# Patient Record
Sex: MALE | Race: WHITE | Employment: FULL TIME | URBAN - NONMETROPOLITAN AREA
[De-identification: names, ages, dates, MRNs, and addresses within clinical notes are randomized per-mention and may not be internally consistent; named-entity substitution may affect disease eponyms.]

---

## 2019-12-19 ENCOUNTER — TELEPHONE (OUTPATIENT)
Dept: ADMINISTRATIVE | Age: 49
End: 2019-12-19

## 2019-12-20 ENCOUNTER — OFFICE VISIT (OUTPATIENT)
Dept: PRIMARY CARE CLINIC | Age: 49
End: 2019-12-20
Payer: MEDICAID

## 2019-12-20 VITALS
TEMPERATURE: 99.3 F | HEIGHT: 68 IN | RESPIRATION RATE: 16 BRPM | WEIGHT: 218.4 LBS | HEART RATE: 86 BPM | BODY MASS INDEX: 33.1 KG/M2 | DIASTOLIC BLOOD PRESSURE: 82 MMHG | SYSTOLIC BLOOD PRESSURE: 126 MMHG | OXYGEN SATURATION: 97 %

## 2019-12-20 DIAGNOSIS — G47.33 OSA (OBSTRUCTIVE SLEEP APNEA): ICD-10-CM

## 2019-12-20 DIAGNOSIS — R53.83 FATIGUE, UNSPECIFIED TYPE: Primary | ICD-10-CM

## 2019-12-20 DIAGNOSIS — Z80.42 FAMILY HISTORY OF PROSTATE CANCER: ICD-10-CM

## 2019-12-20 DIAGNOSIS — B37.2 YEAST DERMATITIS: ICD-10-CM

## 2019-12-20 DIAGNOSIS — Z13.1 SCREENING FOR DIABETES MELLITUS: ICD-10-CM

## 2019-12-20 DIAGNOSIS — R73.9 ELEVATED BLOOD SUGAR: ICD-10-CM

## 2019-12-20 DIAGNOSIS — Z13.220 ENCOUNTER FOR SCREENING FOR LIPID DISORDER: ICD-10-CM

## 2019-12-20 DIAGNOSIS — Z12.5 SCREENING PSA (PROSTATE SPECIFIC ANTIGEN): ICD-10-CM

## 2019-12-20 DIAGNOSIS — B35.1 TOENAIL FUNGUS: ICD-10-CM

## 2019-12-20 DIAGNOSIS — R06.83 SNORING: ICD-10-CM

## 2019-12-20 LAB
ALBUMIN SERPL-MCNC: 4.7 G/DL (ref 3.5–5.2)
ALP BLD-CCNC: 72 U/L (ref 40–130)
ALT SERPL-CCNC: 31 U/L (ref 5–41)
ANION GAP SERPL CALCULATED.3IONS-SCNC: 17 MMOL/L (ref 7–19)
AST SERPL-CCNC: 18 U/L (ref 5–40)
BILIRUB SERPL-MCNC: 1.3 MG/DL (ref 0.2–1.2)
BUN BLDV-MCNC: 14 MG/DL (ref 6–20)
CALCIUM SERPL-MCNC: 9.5 MG/DL (ref 8.6–10)
CHLORIDE BLD-SCNC: 95 MMOL/L (ref 98–111)
CHOLESTEROL, TOTAL: 249 MG/DL (ref 160–199)
CO2: 27 MMOL/L (ref 22–29)
CREAT SERPL-MCNC: 0.7 MG/DL (ref 0.5–1.2)
GFR NON-AFRICAN AMERICAN: >60
GLUCOSE BLD-MCNC: 84 MG/DL (ref 74–109)
HBA1C MFR BLD: 6 % (ref 4–6)
HCT VFR BLD CALC: 51.3 % (ref 42–52)
HDLC SERPL-MCNC: 34 MG/DL (ref 55–121)
HEMOGLOBIN: 16.5 G/DL (ref 14–18)
LDL CHOLESTEROL CALCULATED: 175 MG/DL
MCH RBC QN AUTO: 29.9 PG (ref 27–31)
MCHC RBC AUTO-ENTMCNC: 32.2 G/DL (ref 33–37)
MCV RBC AUTO: 92.9 FL (ref 80–94)
PDW BLD-RTO: 12.8 % (ref 11.5–14.5)
PLATELET # BLD: 204 K/UL (ref 130–400)
PMV BLD AUTO: 10.7 FL (ref 9.4–12.4)
POTASSIUM SERPL-SCNC: 4.4 MMOL/L (ref 3.5–5)
PROSTATE SPECIFIC ANTIGEN: 1.34 NG/ML (ref 0–4)
RBC # BLD: 5.52 M/UL (ref 4.7–6.1)
SODIUM BLD-SCNC: 139 MMOL/L (ref 136–145)
TOTAL PROTEIN: 7.3 G/DL (ref 6.6–8.7)
TRIGL SERPL-MCNC: 200 MG/DL (ref 0–149)
WBC # BLD: 9.5 K/UL (ref 4.8–10.8)

## 2019-12-20 PROCEDURE — 4004F PT TOBACCO SCREEN RCVD TLK: CPT | Performed by: NURSE PRACTITIONER

## 2019-12-20 PROCEDURE — 99204 OFFICE O/P NEW MOD 45 MIN: CPT | Performed by: NURSE PRACTITIONER

## 2019-12-20 PROCEDURE — G8484 FLU IMMUNIZE NO ADMIN: HCPCS | Performed by: NURSE PRACTITIONER

## 2019-12-20 PROCEDURE — G8417 CALC BMI ABV UP PARAM F/U: HCPCS | Performed by: NURSE PRACTITIONER

## 2019-12-20 PROCEDURE — 36415 COLL VENOUS BLD VENIPUNCTURE: CPT | Performed by: NURSE PRACTITIONER

## 2019-12-20 PROCEDURE — G8427 DOCREV CUR MEDS BY ELIG CLIN: HCPCS | Performed by: NURSE PRACTITIONER

## 2019-12-20 RX ORDER — GLUCOSAMINE HCL/CHONDROITIN SU 500-400 MG
CAPSULE ORAL
Qty: 100 STRIP | Refills: 0 | Status: SHIPPED | OUTPATIENT
Start: 2019-12-20 | End: 2020-02-10

## 2019-12-20 RX ORDER — SERTRALINE HYDROCHLORIDE 25 MG/1
25 TABLET, FILM COATED ORAL 2 TIMES DAILY
COMMUNITY
End: 2020-01-07 | Stop reason: SINTOL

## 2019-12-20 RX ORDER — TRIAMCINOLONE ACETONIDE 5 MG/G
CREAM TOPICAL
Qty: 60 G | Refills: 2 | Status: SHIPPED | OUTPATIENT
Start: 2019-12-20 | End: 2019-12-27

## 2019-12-20 RX ORDER — ST. JOHN'S WORT 300 MG
CAPSULE ORAL DAILY
COMMUNITY
End: 2020-04-24 | Stop reason: DRUGHIGH

## 2019-12-20 RX ORDER — LANCETS 30 GAUGE
1 EACH MISCELLANEOUS 2 TIMES DAILY
Qty: 100 EACH | Refills: 5 | Status: SHIPPED | OUTPATIENT
Start: 2019-12-20

## 2019-12-20 RX ORDER — BLOOD-GLUCOSE METER
1 KIT MISCELLANEOUS DAILY
Qty: 1 KIT | Refills: 0 | Status: SHIPPED | OUTPATIENT
Start: 2019-12-20

## 2019-12-20 RX ORDER — ATORVASTATIN CALCIUM 40 MG/1
40 TABLET, FILM COATED ORAL DAILY
COMMUNITY
End: 2020-02-17 | Stop reason: SDUPTHER

## 2019-12-20 RX ORDER — ASCORBIC ACID 1000 MG
TABLET ORAL NIGHTLY
COMMUNITY
End: 2020-04-27 | Stop reason: ALTCHOICE

## 2019-12-20 RX ORDER — LISINOPRIL AND HYDROCHLOROTHIAZIDE 25; 20 MG/1; MG/1
1 TABLET ORAL DAILY
COMMUNITY
End: 2020-01-20 | Stop reason: SDUPTHER

## 2019-12-20 RX ORDER — NYSTATIN 100000 U/G
CREAM TOPICAL
Qty: 1 TUBE | Refills: 2 | Status: SHIPPED | OUTPATIENT
Start: 2019-12-20 | End: 2020-06-22

## 2019-12-20 ASSESSMENT — PATIENT HEALTH QUESTIONNAIRE - PHQ9
SUM OF ALL RESPONSES TO PHQ QUESTIONS 1-9: 2
SUM OF ALL RESPONSES TO PHQ9 QUESTIONS 1 & 2: 2
1. LITTLE INTEREST OR PLEASURE IN DOING THINGS: 1
SUM OF ALL RESPONSES TO PHQ QUESTIONS 1-9: 2
2. FEELING DOWN, DEPRESSED OR HOPELESS: 1

## 2019-12-20 ASSESSMENT — ENCOUNTER SYMPTOMS
GASTROINTESTINAL NEGATIVE: 1
EYES NEGATIVE: 1
RESPIRATORY NEGATIVE: 1

## 2020-01-07 ENCOUNTER — PATIENT MESSAGE (OUTPATIENT)
Dept: PRIMARY CARE CLINIC | Age: 50
End: 2020-01-07

## 2020-01-07 ENCOUNTER — OFFICE VISIT (OUTPATIENT)
Dept: PRIMARY CARE CLINIC | Age: 50
End: 2020-01-07
Payer: MEDICAID

## 2020-01-07 VITALS
HEART RATE: 90 BPM | WEIGHT: 229 LBS | RESPIRATION RATE: 18 BRPM | TEMPERATURE: 98.2 F | SYSTOLIC BLOOD PRESSURE: 130 MMHG | HEIGHT: 68 IN | OXYGEN SATURATION: 98 % | BODY MASS INDEX: 34.71 KG/M2 | DIASTOLIC BLOOD PRESSURE: 80 MMHG

## 2020-01-07 PROCEDURE — 99396 PREV VISIT EST AGE 40-64: CPT | Performed by: NURSE PRACTITIONER

## 2020-01-07 PROCEDURE — G8484 FLU IMMUNIZE NO ADMIN: HCPCS | Performed by: NURSE PRACTITIONER

## 2020-01-07 RX ORDER — BUPROPION HYDROCHLORIDE 150 MG/1
150 TABLET ORAL EVERY MORNING
Qty: 30 TABLET | Refills: 3 | Status: SHIPPED | OUTPATIENT
Start: 2020-01-07 | End: 2020-01-21 | Stop reason: DRUGHIGH

## 2020-01-07 RX ORDER — BUDESONIDE AND FORMOTEROL FUMARATE DIHYDRATE 160; 4.5 UG/1; UG/1
2 AEROSOL RESPIRATORY (INHALATION) 2 TIMES DAILY
Qty: 1 INHALER | Refills: 1 | Status: SHIPPED | OUTPATIENT
Start: 2020-01-07 | End: 2020-04-27 | Stop reason: ALTCHOICE

## 2020-01-07 ASSESSMENT — PATIENT HEALTH QUESTIONNAIRE - PHQ9
2. FEELING DOWN, DEPRESSED OR HOPELESS: 0
SUM OF ALL RESPONSES TO PHQ QUESTIONS 1-9: 0
1. LITTLE INTEREST OR PLEASURE IN DOING THINGS: 0
SUM OF ALL RESPONSES TO PHQ9 QUESTIONS 1 & 2: 0
SUM OF ALL RESPONSES TO PHQ QUESTIONS 1-9: 0

## 2020-01-07 NOTE — PROGRESS NOTES
[address] [address] [address] [place] [address]  Dept: [phone]  Dept Fax: [phone]  Loc: [phone]    Kerby Lesches is a 54 y.o. male who presents today for his medical conditions/complaints as noted below. Kerby Lesches is c/o of Annual Exam        HPI:     HPI   Chief Complaint   Patient presents with    Annual Exam   I had seen him as new patient in Dec. Admits not taking meds faithfully then. He was on zoloft for depression. It is causing ED and would like to try something else. His mother  a few months ago and he was started on Zoloft then. He has moved here to be with his girlfriend and things are better for him socially. Currently he is not working. At the last visit we set him up for home sleep study because of the sleep apnea.   Past Medical History:   Diagnosis Date    COPD (chronic obstructive pulmonary disease) ([address]     Hyperlipidemia     Hypertension     KEM (obstructive sleep apnea)       Past Surgical History:   Procedure Laterality Date    BACK SURGERY      4 rods and 4 screws in back-[facility]    TONSILLECTOMY AND ADENOIDECTOMY         Vitals 2020    SYSTOLIC [id number]   DIASTOLIC 80 82   Pulse 90 86   Temp 98.2 99.3   Resp 18 16   SpO2 98 97   Weight 229 lb 218 lb 6.4 oz   Height 5' 7.5\" 5' 7.5\"   BMI (wt*703/ht~2) 35.33 kg/m2 33.7 kg/m2       Family History   Problem Relation Age of Onset    Diabetes Mother     High Blood Pressure Mother     Sleep Apnea Mother     Prostate Cancer Maternal Grandfather        Social History     Tobacco Use    Smoking status: Current Every Day Smoker     Packs/day: 0.25     Years: 15.00     Pack years: 3.75     Types: Cigarettes    Smokeless tobacco: Never Used   Substance Use Topics    Alcohol use: Not Currently      Current Outpatient Medications   Medication Sig Dispense Refill    tiotropium (SPIRIVA HANDIHALER) 18 MCG inhalation capsule Inhale 1 capsule into the lungs daily 30 capsule 5    atorvastatin (LIPITOR) 40 MG tablet Take 40 mg by mouth daily      Valerian Root 500 MG CAPS Take by mouth nightly      St Johns Wort 300 MG CAPS Take by mouth daily      nystatin (MYCOSTATIN) 620165 UNIT/GM cream On abd. Apply topically 2 times daily. Mix with other cream 1 Tube 2    Lancets MISC 1 each by Does not apply route 2 times daily 100 each 5    blood glucose monitor strips Test 2 times a day & as needed for symptoms of irregular blood glucose. 100 strip 0    glucose monitoring kit (FREESTYLE) monitoring kit 1 kit by Does not apply route daily 1 kit 0    penicillin v potassium (VEETID) 500 MG tablet Take 1 tablet by mouth 3 times daily for 10 days 30 tablet 0    buPROPion (WELLBUTRIN XL) 300 MG extended release tablet Take 1 tablet by mouth every morning 30 tablet 3    nicotine (NICODERM CQ) 14 MG/24HR Place 1 patch onto the skin every 24 hours For 8 wks then call for next step down 7mg 30 patch 1    lisinopril-hydrochlorothiazide (PRINZIDE;ZESTORETIC) 20-25 MG per tablet Take 1 tablet by mouth daily 30 tablet 5    albuterol sulfate  (90 Base) MCG/ACT inhaler Inhale 2 puffs into the lungs 4 times daily as needed for Wheezing 1 Inhaler 5    Umeclidinium Bromide (INCRUSE ELLIPTA) 62.5 MCG/INH AEPB Inhale 1 puff into the lungs daily 1 each 2    budesonide-formoterol (SYMBICORT) 160-4.5 MCG/ACT AERO Inhale 2 puffs into the lungs 2 times daily To replace spiriva 1 Inhaler 1     No current facility-administered medications for this visit.       No Known Allergies    Health Maintenance   Topic Date Due    Pneumococcal 0-64 years Vaccine (1 of 1 - PPSV23) 11/10/1978    DTaP/Tdap/Td vaccine (1 - Tdap) 11/10/1983    HIV screen  11/10/1987    A1C test (Diabetic or Prediabetic)  12/20/2020    Lipid screen  12/20/2020    Potassium monitoring  12/20/2020    Creatinine monitoring  12/20/2020    Flu vaccine  Completed       Subjective:

## 2020-01-08 ENCOUNTER — PATIENT MESSAGE (OUTPATIENT)
Dept: PRIMARY CARE CLINIC | Age: 50
End: 2020-01-08

## 2020-01-13 ENCOUNTER — PATIENT MESSAGE (OUTPATIENT)
Dept: PRIMARY CARE CLINIC | Age: 50
End: 2020-01-13

## 2020-01-13 RX ORDER — ALBUTEROL SULFATE 90 UG/1
2 AEROSOL, METERED RESPIRATORY (INHALATION) 4 TIMES DAILY PRN
Qty: 1 INHALER | Refills: 5 | Status: SHIPPED | OUTPATIENT
Start: 2020-01-13 | End: 2020-07-10

## 2020-01-15 ENCOUNTER — HOSPITAL ENCOUNTER (OUTPATIENT)
Dept: SLEEP CENTER | Age: 50
Discharge: HOME | End: 2020-01-17
Payer: MEDICAID

## 2020-01-15 PROCEDURE — 95806 SLEEP STUDY UNATT&RESP EFFT: CPT

## 2020-01-15 PROCEDURE — G0399 HOME SLEEP TEST/TYPE 3 PORTA: HCPCS

## 2020-01-15 NOTE — PROGRESS NOTES
Pt in the office to  HST monitor. Pt was educated on how to use equipment properly and instructed to wear for 2 nights and to return on Friday. Pt states he understood.

## 2020-01-16 PROCEDURE — G0399 HOME SLEEP TEST/TYPE 3 PORTA: HCPCS

## 2020-01-16 PROCEDURE — 95806 SLEEP STUDY UNATT&RESP EFFT: CPT

## 2020-01-20 ENCOUNTER — PATIENT MESSAGE (OUTPATIENT)
Dept: PRIMARY CARE CLINIC | Age: 50
End: 2020-01-20

## 2020-01-20 RX ORDER — LISINOPRIL AND HYDROCHLOROTHIAZIDE 25; 20 MG/1; MG/1
1 TABLET ORAL DAILY
Qty: 30 TABLET | Refills: 5 | Status: SHIPPED | OUTPATIENT
Start: 2020-01-20 | End: 2020-09-04 | Stop reason: SDUPTHER

## 2020-01-21 RX ORDER — BUPROPION HYDROCHLORIDE 300 MG/1
300 TABLET ORAL EVERY MORNING
Qty: 30 TABLET | Refills: 3 | Status: SHIPPED | OUTPATIENT
Start: 2020-01-21 | End: 2020-03-27

## 2020-01-21 RX ORDER — NICOTINE 21 MG/24HR
1 PATCH, TRANSDERMAL 24 HOURS TRANSDERMAL EVERY 24 HOURS
Qty: 30 PATCH | Refills: 1 | Status: SHIPPED | OUTPATIENT
Start: 2020-01-21 | End: 2020-04-27

## 2020-01-24 PROCEDURE — 95806 SLEEP STUDY UNATT&RESP EFFT: CPT | Performed by: PSYCHIATRY & NEUROLOGY

## 2020-01-26 ENCOUNTER — PATIENT MESSAGE (OUTPATIENT)
Dept: PRIMARY CARE CLINIC | Age: 50
End: 2020-01-26

## 2020-01-27 PROBLEM — F17.200 SMOKER: Status: ACTIVE | Noted: 2020-01-27

## 2020-01-27 PROBLEM — J44.9 CHRONIC OBSTRUCTIVE PULMONARY DISEASE (HCC): Status: ACTIVE | Noted: 2020-01-27

## 2020-01-27 PROBLEM — E78.5 HYPERLIPIDEMIA: Status: ACTIVE | Noted: 2020-01-27

## 2020-01-27 PROBLEM — F41.8 DEPRESSION WITH ANXIETY: Status: ACTIVE | Noted: 2020-01-27

## 2020-01-27 RX ORDER — PENICILLIN V POTASSIUM 500 MG/1
500 TABLET ORAL 3 TIMES DAILY
Qty: 30 TABLET | Refills: 0 | Status: SHIPPED | OUTPATIENT
Start: 2020-01-27 | End: 2020-02-06

## 2020-01-27 ASSESSMENT — ENCOUNTER SYMPTOMS: GASTROINTESTINAL NEGATIVE: 1

## 2020-01-27 NOTE — TELEPHONE ENCOUNTER
From: Caden Brower  To: Sung Rodasok, APRN - CNP  Sent: 1/26/2020 3:32 PM CST  Subject: Non-Urgent Medical Question    I had half of my teeth pulled out on thuraday January 16. I had been doing fine and now I have a severe cold or sinus infection and I have a deep and forceful cough especially when I lay down. I am coughing up yellow/ brown mucas as well. I have used the inhalers and have tried to control it that way also with cough syrup. I don't know if it is my sinuses or not but doesn't seem to be in my chest. I am due to have the remaining half taken out this Thursday January 30. I need to be seen or have something called in for my infection. Can u please advise.   Michael magdaleno

## 2020-02-17 RX ORDER — ATORVASTATIN CALCIUM 40 MG/1
40 TABLET, FILM COATED ORAL DAILY
Qty: 90 TABLET | Refills: 3 | Status: SHIPPED | OUTPATIENT
Start: 2020-02-17 | End: 2020-11-02 | Stop reason: SDUPTHER

## 2020-02-25 ENCOUNTER — TELEPHONE (OUTPATIENT)
Dept: NEUROLOGY | Age: 50
End: 2020-02-25

## 2020-02-25 NOTE — TELEPHONE ENCOUNTER
Called patient about an appointment with Nathan Bethea, could not reach patient by phone , left message on patients voice mail.

## 2020-03-27 RX ORDER — BUPROPION HYDROCHLORIDE 300 MG/1
300 TABLET ORAL EVERY MORNING
Qty: 30 TABLET | Refills: 3 | Status: SHIPPED | OUTPATIENT
Start: 2020-03-27 | End: 2020-04-24 | Stop reason: DRUGHIGH

## 2020-04-01 ENCOUNTER — PATIENT MESSAGE (OUTPATIENT)
Dept: PRIMARY CARE CLINIC | Age: 50
End: 2020-04-01

## 2020-04-02 RX ORDER — OMEPRAZOLE 20 MG/1
20 CAPSULE, DELAYED RELEASE ORAL
Qty: 30 CAPSULE | Refills: 5 | Status: SHIPPED | OUTPATIENT
Start: 2020-04-02 | End: 2020-07-02

## 2020-04-24 ENCOUNTER — PATIENT MESSAGE (OUTPATIENT)
Dept: PRIMARY CARE CLINIC | Age: 50
End: 2020-04-24

## 2020-04-24 RX ORDER — BUPROPION HYDROCHLORIDE 450 MG/1
TABLET, FILM COATED, EXTENDED RELEASE ORAL
Qty: 30 TABLET | Refills: 3 | Status: SHIPPED | OUTPATIENT
Start: 2020-04-24 | End: 2020-07-07 | Stop reason: DRUGHIGH

## 2020-04-24 NOTE — TELEPHONE ENCOUNTER
From: Wolf Loyola  To: Seth Neri APRN - CNP  Sent: 4/24/2020 12:18 PM CDT  Subject: Prescription Question    Ok I am sorry I sent this messed up voice message. Not my best thing ever, but i have been taking bupropion xl 300mg daily for my depression and anxiety. With all this going on in the world it was really bothering me and still is so I messaged and I was sent buspirone 10mg 3x daily and I am out of that medicine. It doesn't seem to help with the additional high anxiety I am having. Should we increase my current medicine bupropion xl or can we try something else for the anxiety. I am having a hard time with the it.    Tila Vaughn

## 2020-04-27 ENCOUNTER — TELEMEDICINE (OUTPATIENT)
Dept: NEUROLOGY | Age: 50
End: 2020-04-27
Payer: MEDICAID

## 2020-04-27 PROBLEM — G47.33 OBSTRUCTIVE SLEEP APNEA: Status: ACTIVE | Noted: 2020-04-27

## 2020-04-27 PROBLEM — R09.02 HYPOXEMIA: Status: ACTIVE | Noted: 2020-04-27

## 2020-04-27 PROBLEM — G25.81 RESTLESS LEG SYNDROME: Status: ACTIVE | Noted: 2020-04-27

## 2020-04-27 PROBLEM — Z99.89 CPAP (CONTINUOUS POSITIVE AIRWAY PRESSURE) DEPENDENCE: Status: ACTIVE | Noted: 2020-04-27

## 2020-04-27 PROCEDURE — G8427 DOCREV CUR MEDS BY ELIG CLIN: HCPCS | Performed by: PHYSICIAN ASSISTANT

## 2020-04-27 PROCEDURE — 99214 OFFICE O/P EST MOD 30 MIN: CPT | Performed by: PHYSICIAN ASSISTANT

## 2020-04-27 RX ORDER — ROPINIROLE 0.5 MG/1
TABLET, FILM COATED ORAL
Qty: 30 TABLET | Refills: 3 | Status: SHIPPED | OUTPATIENT
Start: 2020-04-27 | End: 2020-08-20 | Stop reason: SDUPTHER

## 2020-04-27 NOTE — PROGRESS NOTES
30 capsule 5    nystatin (MYCOSTATIN) 660191 UNIT/GM cream On abd. Apply topically 2 times daily. Mix with other cream 1 Tube 2    Lancets MISC 1 each by Does not apply route 2 times daily 100 each 5    glucose monitoring kit (FREESTYLE) monitoring kit 1 kit by Does not apply route daily 1 kit 0     No current facility-administered medications for this visit. No Known Allergies      PHYSICAL EXAMINATION:    Constitutional -   General appearance: Alert in no acute distress   EYES -   Conjunctiva and lids appears normal  ENT-    Hearing intact, no scars, masses, or lesions over external nose on visible skin  Pulmonary-   Breathing appears normal, good expansion, normal effort without use of accessory muscles  Musculoskeletal -   No gross bony deformities  Gait as below, see gait exam in the neurologic exam  Skin -   No rash, erythema, or pallor noted on visible skin  Psychiatric -   Mood, affect, and behavior appear normal    Memory as below see mental status examination in the neurologic exam    NEUROLOGICAL EXAM    Mental status   [x]Awake, alert, oriented   [x]Affect attention and concentration appear appropriate  [x]Recent and remote memory appears unremarkable  [x]Speech normal without dysarthria or aphasia, comprehension and repetition intact.    COMMENTS:    Cranial Nerves [x] PER, EOMI, no nystagmus, conjugate eye movements, no ptosis  [x]Face symmetric  [x]Tongue midline   [x]Shoulder shrug normal bilaterally  COMMENTS:   Motor   [x]Antigravity x 4 extremities  [x]Normal visible bulk and tone  [x]No tremor present  COMMENTS:       Coordination [x]CAROL ANN normal bilaterally  [x]Extension to nose normal bilaterally  COMMENTS:    Gait                  [x]Normal steady gait    []Ataxic    []Spastic     []Magnetic     []Shuffling  COMMENTS:       [] OTHER:      Due to this being a TeleHealth encounter, evaluation of the following organ systems is limited: Vitals/Constitutional/EENT/Resp/CV/GI//MS/Neuro/Skin/Heme-Lymph-Imm. LABS RECORD AND IMAGING REVIEW (As below and per HPI)    No results found for: [id number]  Lab Results   Component Value Date    WBC 9.5 12/20/2019    HGB 16.5 12/20/2019    HCT 51.3 12/20/2019    MCV 92.9 12/20/2019     12/20/2019     Lab Results   Component Value Date     12/20/2019    K 4.4 12/20/2019    CL 95 (L) 12/20/2019    CO2 27 12/20/2019    BUN 14 12/20/2019    CREATININE 0.7 12/20/2019    GLUCOSE 84 12/20/2019    CALCIUM 9.5 12/20/2019    PROT 7.3 12/20/2019    LABALBU 4.7 12/20/2019    BILITOT 1.3 (H) 12/20/2019    ALKPHOS 72 12/20/2019    AST 18 12/20/2019    ALT 31 12/20/2019    LABGLOM >60 12/20/2019           I reviewed the following studies:       []  :  Clinical laboratory test results    []  :  Radiology reports    [x]  :  Review and summarization of medical records and/or obtain medical records     [x]  :  Previous/recent polysomnogram report(s)/HST    []  :  [place] Sleepiness Scale           [x]  :  Compliance download: The auto CPAP is set at a pressure range of 10cm to 20cm. Compliance download shows that he uses device: 93% of the time; percentage of days with usage >=4 hours: 87%. AHI: 1.0    ASSESSMENT:    Jorje Victor is a 54y.o. year old male evaluated via Telehealth encounter for evaluation of PAP efficacy and compliance. ICD-10-CM    1. Obstructive sleep apnea G47.33 Sleep Study with PAP Titration     [facility]   2. Restless leg syndrome G25.81    3. Hypoxemia R09.02 Sleep Study with PAP Titration     [facility]   4.  CPAP (continuous positive airway pressure) dependence Z99.89 Sleep Study with PAP Titration     [facility]          []  :  Stable     []  :  Improved                       [x]  :  Well controlled-KEM appears to be controlled as noted on compliance report; low residual AHI, but he continues to be symptomatic for KEM, as noted

## 2020-04-27 NOTE — PATIENT INSTRUCTIONS
based upon the person's medical history, physical examination, and testing, including:  ? A complaint of snoring and ineffective sleep  ? Neck size (greater than 16 inches in men or 14 inches in women) is associated with an increased risk of sleep apnea  ? A small upper airway: difficulty seeing the throat because of a tongue that is large for the mouth  ? High blood pressure, especially if it is resistant to treatment  ? If a bed partner has observed the patient during episodes of stopped breathing (apnea), choking, or gasping during sleep, there is a strong possibility of sleep apnea. Testing is usually performed in a sleep laboratory. A full sleep study is called a polysomnogram. The polysomnogram measures the breathing effort and airflow, blood oxygen level, heart rate and rhythm, duration of the various stages of sleep, body position, and movement of the arms/legs. Home monitoring devices are available that can perform a sleep study. This is a reasonable alternative to conventional testing in a sleep laboratory if the clinician strongly suspects moderate or severe sleep apnea and the patient does not have other illnesses or sleep disorders that may interfere with the results. SLEEP APNEA TREATMENT -- Sleep apnea is best treated by a knowledgeable sleep medicine specialist. The goal of treatment is to maintain an open airway during sleep. Effective treatment will eliminate the symptoms of sleep disturbance; long-term health consequences are also reduced. Most treatments require nightly use. The challenge for the clinician and the patient is to select an effective therapy that is appropriate for the patient's problem and that is acceptable for long term use. Auto-titrating CPAP delivers an amount of PAP that varies during the night. The variation is dependent on event detection software algorithms, which will increase the pressure gradually in response to flow changes until adequate patency is detected. the persons condition changes, such as a gain or loss of weight. Adjust sleep position -- Adjusting sleep position (to stay off the back) may help improve sleep quality in people who have KEM when sleeping on the back. However, this is difficult to maintain throughout the night and is rarely an adequate solution. Weight loss -- Weight loss may be helpful for obese or overweight patients. Weight loss may be accomplished with dietary changes, exercise, and/or surgical treatment. However, it can be difficult to maintain weight loss; the five-year success of non-surgical weight loss is only 5 percent, meaning that 95 percent of people regain lost weight. Avoid alcohol and other sedatives -- Alcohol can worsen sleepiness, potentially increasing the risk of accidents or injury. People with KEM are often counseled to drink little to no alcohol, even during the daytime. Similarly, people who take anti-anxiety medications or sedatives to sleep should speak with their healthcare provider about the safety of these medications. People with KEM must notify all healthcare providers, including surgeons, about their condition and the potential risks of being sedated. People with KEM who are given anesthesia and/or pain medications require special management and close monitoring to reduce the risk of a blocked airway. Dental devices -- A dental device, called an oral appliance or mandibular advancement device, can reposition the jaw (mandible), bringing the tongue and soft palate forward as well. This may relieve obstruction in some people. This treatment is excellent for reducing snoring, although the effect on KEM is sometimes more limited. As a result, dental devices are best used for mild cases of KEM when relief of snoring is the main goal. Failure to tolerate and accept CPAP is another indication for dental devices. While dental devices are not as effective as CPAP for KEM, some patients prefer a dental device to CPAP. Side effects of dental devices are generally minor but may include changes to the bite with prolonged use. Surgical treatment -- Surgery is an alternative therapy for patients who cannot tolerate or do not improve with nonsurgical treatments such as CPAP or oral devices. Surgery can also be used in combination with other nonsurgical treatments. Surgical procedures reshape structures in the upper airways or surgically reposition bone or soft tissue. Uvulopalatopharyngoplasty (UPPP) removes the uvula and excessive tissue in the throat, including the tonsils, if present. Other procedures, such as maxillomandibular advancement (MMA), address both the upper and lower pharyngeal airway more globally. UPPP alone has limited success rates (less than 50 percent) and people can relapse (when KEM symptoms return after surgery). As a result, this surgery is only recommended in a minority of people and should be considered with caution. MMA may have a higher success rate, particularly in people with abnormal jaw (maxilla and mandible) anatomy, but it is the most complicated procedure. A newer surgical approach, nerve stimulation to protrude the tongue, has promising success rates in very selected people. Tracheostomy creates a permanent opening in the neck. It is reserved for people with severe disease in whom less drastic measures have failed or are inappropriate. Although it is always successful in eliminating obstructive sleep apnea, tracheostomy requires significant lifestyle changes and carries some serious risks (eg, infection, bleeding, blockage). All surgical treatments require discussions about the goals of treatment, the expected outcomes, and potential complications.   Hypoglossal nerve stimulator- \"Inspire\" device    PAP treatment failure:  Possible causes of treatment failure include nonadherence or suboptimal adherence, weight gain, an inappropriate level of prescribed positive pressure, or an additional disorder causing sleepiness (eg, narcolepsy) that may require alterations in the therapeutic regimen. A review of medications should also be undertaken since many drugs may lead to sleepiness. Inadequate sleep time may also negate the expected effects from treatment of KEM. Also, pt's can have persistent hypersomnolence associated with sleep apnea even in the presence of adequate therapy and at those times Provigil or Nuvigil or other stimulants may be indicated. Once the patient's positive airway pressure therapy has been optimized and symptoms resolved, a regimen of long-term follow-up should be established. Annual visits are reasonable, with more frequent visits in between if new issues arise. The purpose of long-term follow-up is to assess usage and monitor for recurrent KEM, new side effects, air leakage, and fluctuations in body weight. WHERE TO GET MORE INFORMATION -- Your healthcare provider is the best source of information for questions and concerns related to your medical problem. Organizations  American Sleep Apnea Association  Provides information about sleep apnea to the public, publishes a newsletter, and serves as an advocate for people with the disorder. [place], [address] [facility], [address] [place] [address]   Nuzhat@[email or web address] org   [employer].. org   Tel: [phone]   Fax: [facility] organization that works to [employer] and safety by promoting public understanding of sleep and sleep disorders. Supports sleep-related education, research, and advocacy; produces and distributes educational materials to the public and healthcare professionals; and offers postdoctoral fellowships and grants for sleep researchers. Vinicius Khan@[email or web address]. org   Norma.[email or web address] [place]   Tel: [phone]   Fax: [phone]    Important information:  Medicare/private insurance CPAP/BiPAP/APAP requirements:  Medicare/private insurance has specific requirements for PAP compliance that must be met during the first 90 days of use to continue coverage for CPAP/BiPAP/APAP  from day 91 and beyond. The policy requires that patients use a PAP device 4 hours per 24 hour period, at least 70% of the time over a 30 day period. This data must be downloaded as a report direct from the PAP devices. This is called a compliance download. Your PAP supplier will assist you in this matter. Reminder:  Please bring a copy of the compliance download to your next office visit or have your supplier fax it to our office prior to your office visit. Note:  Where applicable, we will utilize PAP device efficiency reports, additional testing, and face-to-face  clinical evaluation subsequent to any treatment, changes in treatment, and continued treatment. Caution:  Please abstain from driving or engaging in other activities which may be hazardous in the presence of diminished alertness or daytime drowsiness. And avoid the use of sedatives or alcohol, which can worsen sleep apnea and daytime drowsiness. Mask suggestions:  - Resmed Airfit N20 (Nasal) or F20 (Full face mask). They conform to your face, thus decreasing the potential for mask leakage. You might like the [employer] (full face mask). It has a \"memory foam\" like cushion. The AirFit F30 is a smaller style full face mask designed to sit low on and cover less of your face for fewer facial marks. Respironics: You might also like to try a nasal mask called a Dreamwear nasal mask or the Dreamwear nasal pillow. Another suggestion is the [facility], it is a minimal contact full face mask. The [employer] incredible under the nose design makes it the only full face mask that won't cause red marks on the bridge of your nose when compared to other full face masks.  The Dreamwear full face mask has a  soft feel, unique in-frame symptoms of RLS can make it hard to get a good night's sleep. People with the condition often feel tired during the day. Is there a test for RLS? -- There is a test, but it is not usually necessary. Your doctor or nurse should be able to tell if you have it by asking about your symptoms and doing an exam. Still, it is possible that your doctor or nurse will decide to send you for a \"sleep study,\" to be sure of what is happening. For a sleep study, you spend the night in a lab, and you are hooked up to different machines that monitor your movements, heart rate, breathing, and other body functions. Is there anything I can do on my own to feel better? -- Yes. You might feel better if you:  ? Do activities that keep your mind alert during the day, such as crossword puzzles  ? Get moderate regular exercise  ? Massage your legs (or have someone massage them)  ? Apply heat to your legs with heating pads or by taking a warm bath  ? Avoid taking medicines that can make RLS worse - These include antihistamines like diphenhydramine (sample brand name: Benadryl). Should I see a doctor or nurse? -- See your doctor or nurse if your condition bothers you, or if it keeps you from getting a good night's sleep. How is RLS treated? -- Some people with RLS do not need medicine for it because they have mild symptoms that don't bother them very often. If treatment is needed, there are a number of medicines doctors can suggest. Examples include:  ?Iron supplements  ? Pramipexole (brand name: Mirapex)  ? Ropinirole (brand name: Rina Pikes)  ? Rotigotine (brand name: Neupro)  ? Carbidopa-levodopa (brand name: Sinemet)  ? Gabapentin enacarbil (brand name: Guerda Fern)  ? Gabapentin (brand name: Neurontin)  ? Pregabalin (brand name: Lyrica)  In people with RLS who also have a severe form of kidney disease called kidney failure, the RLS might improve with a treatment called hemodialysis (also known as just dialysis).   What if I am pregnant? -- If you are pregnant, you can take iron supplements and try the other tips that do not involve taking prescription medicines. Most of the medicines used to treat RLS are not proven to be safe to take during pregnancy. If your symptoms are bad, there are some medicines that might be OK to take. But keep in mind that the condition usually goes away or gets much better after you give birth. Patient Education        ropinirole (oral)  Pronunciation:  sergio SMALLS i role  Brand:  Requip, Requip XL  What is the most important information I should know about ropinirole? Follow all directions on your medicine label and package. Tell each of your healthcare providers about all your medical conditions, allergies, and all medicines you use. What is ropinirole? Ropinirole has some of the same effects as a chemical called dopamine, which occurs naturally in your body. Low levels of dopamine in the brain are associated with Parkinson's disease. Ropinirole is used to treat symptoms of Parkinson's disease (stiffness, tremors, muscle spasms, and poor muscle control). Ropinirole is also used to treat restless legs syndrome (RLS). Only immediate-release ropinirole (Requip) is approved to treat either Parkinson symptoms or RLS. Extended-release ropinirole (Requip XL) is approved only to treat Parkinson symptoms. Parkinson's and RLS are two separate disorders. Having one of these conditions will not cause you to have the other condition. Ropinirole may also be used for purposes not listed in this medication guide. What should I discuss with my healthcare provider before taking ropinirole? You should not use ropinirole if you are allergic to it.   To make sure ropinirole is safe for you, tell your doctor if you have:  · high or low blood pressure;  · kidney disease (or if you are on dialysis);  · heart disease, heart rhythm problems;  · a sleep disorder such as narcolepsy, or other conditions that may cause daytime sleepiness; or  · if you smoke.  People with Parkinson's disease may have a higher risk of skin cancer (melanoma). Talk to your doctor about this risk and what skin symptoms to watch for. It is not known whether this medicine will harm an unborn baby. Tell your doctor if you are pregnant or plan to become pregnant. It is not known whether ropinirole passes into breast milk or if it could affect the nursing baby. Ropinirole may slow breast milk production. Tell your doctor if you are breast-feeding. Ropinirole is not approved for use by anyone younger than 25years old. How should I take ropinirole? Follow all directions on your prescription label. Your doctor may occasionally change your dose. Do not take this medicine in larger or smaller amounts or for longer than recommended. If you are taking immediate-release ropinirole (Requip) you should not take extended-release ropinirole (Requip XL) at the same time. The dose and timing of ropinirole in treating Parkinson's disease is different from the dose and timing in treating RLS. Follow the directions on your prescription label. Ask your pharmacist if you have any questions about the kind of ropinirole you receive at the pharmacy. Ropinirole can be taken with or without food. Take the medicine at the same time each day. Do not crush, chew, or break an extended-release tablet (Requip XL). Swallow it whole. Call your doctor if you see part of the ropinirole tablet in your stool. This is a sign that your body may not have absorbed all of the medicine. If you are taking this medicine for RLS, tell your doctor if your symptoms get worse, if they occur in the morning or earlier than usual in the evening, or if you feel restless symptoms in your hands or arms. It may take up to several weeks before your symptoms improve. Keep using the medication as directed and tell your doctor if your symptoms do not improve.   Do not stop using ropinirole suddenly, or you could have unpleasant

## 2020-05-14 RX ORDER — UMECLIDINIUM 62.5 UG/1
AEROSOL, POWDER ORAL
Qty: 30 EACH | Refills: 3 | Status: SHIPPED | OUTPATIENT
Start: 2020-05-14 | End: 2020-10-05 | Stop reason: ALTCHOICE

## 2020-06-10 ENCOUNTER — HOSPITAL ENCOUNTER (OUTPATIENT)
Dept: SLEEP CENTER | Age: 50
Discharge: HOME | End: 2020-06-12
Payer: MEDICAID

## 2020-06-10 PROCEDURE — 95811 POLYSOM 6/>YRS CPAP 4/> PARM: CPT

## 2020-06-11 NOTE — PROGRESS NOTES
[place] [address]  [place], [address]  Phone [phone] Fax [phone]     Sleep Study Technician Review    Patient Name:  Adelso Blandon  :   1970  Referring Provider: VIVIANA Rivero    Brief History:  Adelso Blandon is a 54 y.o. Last sleep study was about  and lost mask with moving. He is snoring every night and very fatigued the next day he would like to have another sleep study and get a new CPAP machine. hasnt had blood work in a while. He has had some high sugar in past.  He was able to lose weight and make lifestyle changes and get the blood sugar down without medications. Height: 67 inches  Weight:229   BMI: 35.9   Neck Circ: 18  MALLAMPATI:4  ESS: 187     Type of Study: Titration Sleep Study  Time Stage Position Snore Hypopnea Obs Apnea Malgorzata Apnea PAP O2   2200 2 supine yes yes no no +8 RA   2300 2 supine yes yes no no +9 RA   2400 2 supine yes yes no no +10 RA   0100 2 supine Yes  yes no no +11 RA   0200 awake supine no no no no +11 RA   0300 rem supine no no no no +11 RA   0400 rem supine no no no no +11 RA     Summary:  Patient did well with cpap of +11 and a full face mask. He voiced no complaints. DME: +11 cmh2o   Final PAP settings:  [facility]  Mask Type: Full face   Mask: Vitera    Mask Size: Large    The study was reviewed briefly with Adelso Blandon. He will be notified of the formal results and recommendations after the study is scored and interpreted. The report will be sent to his referring provider.     Technician: Devan Dickerson

## 2020-06-19 PROCEDURE — 95811 POLYSOM 6/>YRS CPAP 4/> PARM: CPT | Performed by: PSYCHIATRY & NEUROLOGY

## 2020-06-22 ENCOUNTER — OFFICE VISIT (OUTPATIENT)
Dept: PRIMARY CARE CLINIC | Age: 50
End: 2020-06-22
Payer: MEDICAID

## 2020-06-22 VITALS
TEMPERATURE: 98.4 F | WEIGHT: 230 LBS | SYSTOLIC BLOOD PRESSURE: 110 MMHG | HEART RATE: 86 BPM | BODY MASS INDEX: 36.96 KG/M2 | OXYGEN SATURATION: 97 % | HEIGHT: 66 IN | RESPIRATION RATE: 16 BRPM | DIASTOLIC BLOOD PRESSURE: 80 MMHG

## 2020-06-22 PROCEDURE — G8427 DOCREV CUR MEDS BY ELIG CLIN: HCPCS | Performed by: NURSE PRACTITIONER

## 2020-06-22 PROCEDURE — 1036F TOBACCO NON-USER: CPT | Performed by: NURSE PRACTITIONER

## 2020-06-22 PROCEDURE — G8417 CALC BMI ABV UP PARAM F/U: HCPCS | Performed by: NURSE PRACTITIONER

## 2020-06-22 PROCEDURE — 99213 OFFICE O/P EST LOW 20 MIN: CPT | Performed by: NURSE PRACTITIONER

## 2020-06-22 RX ORDER — PREDNISONE 10 MG/1
TABLET ORAL
Qty: 18 TABLET | Refills: 0 | Status: SHIPPED | OUTPATIENT
Start: 2020-06-22 | End: 2020-07-07 | Stop reason: ALTCHOICE

## 2020-06-22 RX ORDER — CYCLOBENZAPRINE HCL 10 MG
10 TABLET ORAL 3 TIMES DAILY PRN
Qty: 30 TABLET | Refills: 0 | Status: SHIPPED | OUTPATIENT
Start: 2020-06-22 | End: 2020-08-18 | Stop reason: SDUPTHER

## 2020-06-22 NOTE — PROGRESS NOTES
[address] [address] [place] [address]  Dept: [phone]  Dept Fax: [phone]  Loc: [phone]    Nigel Freedman is a 54 y.o. male who presents today for his medical conditions/complaints as noted below. Nigel Freedman is c/o of Back Pain (patient presents today with c/o right shoulder pain that radiates down his right side. He states that this pain has been going on for a while and he has been taking ibuprofen for this. )        HPI:     HPI   Chief Complaint   Patient presents with    Back Pain     patient presents today with c/o right shoulder pain that radiates down his right side. He states that this pain has been going on for a while and he has been taking ibuprofen for this. he had low back surgery in 2007. Over last month neck pain radiates down right shoulder and arm gets a cramp under scapula. He has tried ibuprofen and tylenol without relief. He has tried heat and ice.    Past Medical History:   Diagnosis Date    COPD (chronic obstructive pulmonary disease) (HCC)     CPAP (continuous positive airway pressure) dependence     10cm to 20cm    Hyperlipidemia     Hypertension     Obstructive sleep apnea     AHI: 84.1 night one and 67.3 night two, per HST, 1/2020    KEM (obstructive sleep apnea)       Past Surgical History:   Procedure Laterality Date    BACK SURGERY  2013    4 rods and 4 screws in back-[facility]    TONSILLECTOMY AND ADENOIDECTOMY         Vitals 6/22/2020 1/7/2020 60/02/8256   SYSTOLIC 565 542 420   DIASTOLIC 80 80 82   Pulse 86 90 86   Temp 98.4 98.2 99.3   Resp 16 18 16   SpO2 97 98 97   Weight 230 lb 229 lb 218 lb 6.4 oz   Height 5' 6\" 5' 7.5\" 5' 7.5\"   BMI (wt*703/ht~2) 37.12 kg/m2 35.33 kg/m2 33.7 kg/m2       Family History   Problem Relation Age of Onset    Diabetes Mother     High Blood Pressure Mother     Sleep Apnea Mother     Prostate Cancer Maternal Grandfather        Social History screen  12/20/2020    Potassium monitoring  12/20/2020    Creatinine monitoring  12/20/2020    Flu vaccine  Completed    Hepatitis A vaccine  Aged Out    Hepatitis B vaccine  Aged Out    Hib vaccine  Aged Out    Meningococcal (ACWY) vaccine  Aged Out       Subjective:      Review of Systems   Constitutional: Positive for activity change. Musculoskeletal: Positive for neck pain. Psychiatric/Behavioral: Negative. Objective:     Physical Exam  Vitals signs and nursing note reviewed. Constitutional:       Appearance: He is well-developed. HENT:      Head: Normocephalic. Cardiovascular:      Rate and Rhythm: Normal rate and regular rhythm. Heart sounds: Normal heart sounds. Pulmonary:      Effort: Pulmonary effort is normal.      Breath sounds: Normal breath sounds. Musculoskeletal:      Cervical back: He exhibits decreased range of motion, tenderness and pain. He exhibits no bony tenderness, no swelling, no edema, no deformity, no laceration, no spasm and normal pulse. Skin:     General: Skin is warm and dry. Neurological:      Mental Status: He is alert and oriented to person, place, and time. Psychiatric:         Behavior: Behavior normal.         Thought Content: Thought content normal.         Judgment: Judgment normal.       /80   Pulse 86   Temp 98.4 °F (36.9 °C) (Temporal)   Resp 16   Ht 5' 6\" (1.676 m)   Wt 230 lb (104.3 kg)   SpO2 97%   BMI 37.12 kg/m²     Assessment:       Diagnosis Orders   1. Neck pain on right side  XR CERVICAL SPINE (4-5 VIEWS)   2. Radicular pain in right arm  XR CERVICAL SPINE (4-5 VIEWS)         Plan:   More than 50% of the time was spent counseling and coordinating care for a total time of 15 min face to face. Patient given educational materials -see patient instructions. Discussed use, benefit, and side effects of prescribed medications. All patient questions answered. Pt voiced understanding. Reviewed health maintenance.

## 2020-06-22 NOTE — PATIENT INSTRUCTIONS
and glide your head backward as in the dorsal glide stretch. 2. Raise both arms so that your hands are next to your ears. 3. Take a deep breath, and as you breathe out, lower your elbows down and behind your back. You will feel your shoulder blades slide down and together, and at the same time you will feel a stretch across your chest and the front of your shoulders. 4. Hold for about 6 seconds, and then relax for up to 10 seconds. 5. Repeat 8 to 12 times. Strengthening: Hands on head   1. Move your head backward, forward, and side to side against gentle pressure from your hands, holding each position for about 6 seconds. 2. Repeat 8 to 12 times. Follow-up care is a key part of your treatment and safety. Be sure to make and go to all appointments, and call your doctor if you are having problems. It's also a good idea to know your test results and keep a list of the medicines you take. Where can you learn more? Go to https://[employer]. org and sign in to your [employer] account. Enter P975 in the mInfo box to learn more about \"Neck: Exercises. \"     If you do not have an account, please click on the \"Sign Up Now\" link. Current as of: March 2, 2020               Content Version: 12.5  © 4358-3636 Healthwise, [employer]. Care instructions adapted under license by [facility] ([facility]). If you have questions about a medical condition or this instruction, always ask your healthcare professional. [place] [address] any warranty or liability for your use of this information.

## 2020-07-02 ENCOUNTER — OFFICE VISIT (OUTPATIENT)
Dept: OTOLARYNGOLOGY | Age: 50
End: 2020-07-02
Payer: MEDICAID

## 2020-07-02 ENCOUNTER — TELEPHONE (OUTPATIENT)
Dept: OTOLARYNGOLOGY | Age: 50
End: 2020-07-02

## 2020-07-02 VITALS
DIASTOLIC BLOOD PRESSURE: 76 MMHG | WEIGHT: 232 LBS | SYSTOLIC BLOOD PRESSURE: 132 MMHG | HEIGHT: 66 IN | BODY MASS INDEX: 37.28 KG/M2

## 2020-07-02 PROBLEM — R13.14 PHARYNGOESOPHAGEAL DYSPHAGIA: Status: ACTIVE | Noted: 2020-07-02

## 2020-07-02 PROCEDURE — 1036F TOBACCO NON-USER: CPT | Performed by: PHYSICIAN ASSISTANT

## 2020-07-02 PROCEDURE — 31575 DIAGNOSTIC LARYNGOSCOPY: CPT | Performed by: PHYSICIAN ASSISTANT

## 2020-07-02 PROCEDURE — G8427 DOCREV CUR MEDS BY ELIG CLIN: HCPCS | Performed by: PHYSICIAN ASSISTANT

## 2020-07-02 PROCEDURE — G8417 CALC BMI ABV UP PARAM F/U: HCPCS | Performed by: PHYSICIAN ASSISTANT

## 2020-07-02 PROCEDURE — 99212 OFFICE O/P EST SF 10 MIN: CPT | Performed by: PHYSICIAN ASSISTANT

## 2020-07-02 RX ORDER — OMEPRAZOLE 20 MG/1
20 CAPSULE, DELAYED RELEASE ORAL
Qty: 60 CAPSULE | Refills: 1 | Status: SHIPPED | OUTPATIENT
Start: 2020-07-02 | End: 2020-09-09 | Stop reason: SDUPTHER

## 2020-07-02 ASSESSMENT — ENCOUNTER SYMPTOMS
FACIAL SWELLING: 0
SINUS PAIN: 0
TROUBLE SWALLOWING: 1
EYE PAIN: 0
SINUS PRESSURE: 0
PHOTOPHOBIA: 0
VOICE CHANGE: 0
SORE THROAT: 1
RHINORRHEA: 0

## 2020-07-02 NOTE — PROGRESS NOTES
[facility] OTOLARYNGOLOGY/ENT  Mr. Curtis Sylvester is a pleasant 68-year-old  male that was referred by Wong Manzanares due to problems with dysphasia and a globus sensation. He reports this been going on for 2 to 3 months and seems to be worsening. He was sent for an x-ray of the neck which demonstrated a questionable mass at the epiglottis. Due to this finding a flexible laryngoscopy was recommended as well as generalized evaluation. Patient admits to having longstanding history of reflux and has been treating this with omeprazole 20 mg daily. Overall the patient denies any weight changes, cervical lymphadenopathy or any other additional issues. Allergies: Patient has no known allergies. Current Outpatient Medications   Medication Sig Dispense Refill    omeprazole (PRILOSEC) 20 MG delayed release capsule Take 1 capsule by mouth 2 times daily (before meals) 60 capsule 1    cyclobenzaprine (FLEXERIL) 10 MG tablet Take 1 tablet by mouth 3 times daily as needed for Muscle spasms May make drowsy 30 tablet 0    predniSONE (DELTASONE) 10 MG tablet Days 1-3 take 1 PO TID, days 4-6 take 1 PO BID, days 7-9 take 1 PO daily.  18 tablet 0    INCRUSE ELLIPTA 62.5 MCG/INH AEPB INHALE 1 PUFF INTO THE LUNGS DAILY 30 each 3    rOPINIRole (REQUIP) 0.5 MG tablet Take 1 q hs 30 tablet 3    buPROPion HCl ER, XL, 450 MG TB24 Increase in dose to 450mg PO daily 30 tablet 3    atorvastatin (LIPITOR) 40 MG tablet Take 1 tablet by mouth daily 90 tablet 3    blood glucose test strips (ONE TOUCH ULTRA TEST) strip USE TO TEST TWICE DAILY 100 strip 3    lisinopril-hydrochlorothiazide (PRINZIDE;ZESTORETIC) 20-25 MG per tablet Take 1 tablet by mouth daily 30 tablet 5    albuterol sulfate  (90 Base) MCG/ACT inhaler Inhale 2 puffs into the lungs 4 times daily as needed for Wheezing 1 Inhaler 5    tiotropium (SPIRIVA HANDIHALER) 18 MCG inhalation capsule Inhale 1 capsule into the lungs daily 30 capsule 5    Lancets MISC 1 each by Does not apply route 2 times daily 100 each 5    glucose monitoring kit (FREESTYLE) monitoring kit 1 kit by Does not apply route daily 1 kit 0     No current facility-administered medications for this visit. Past Surgical History:   Procedure Laterality Date    BACK SURGERY  2013    4 rods and 4 screws in back-[facility]    TONSILLECTOMY AND ADENOIDECTOMY         Past Medical History:   Diagnosis Date    COPD [facility]ch[facility] disease[facility] ([facility]     CPAP (continuous positive airway pressure) dependence     10cm to 20cm    Hyperlipidemia     Hypertension     Obstructive sleep apnea     AHI: 84.1 night one and 67.3 night two, per HST, 1/2020    KEM (obstructive sleep apnea)        Family History   Problem Relation Age of Onset    Diabetes Mother     High Blood Pressure Mother     Sleep Apnea Mother     Prostate Cancer Maternal Grandfather        Social History     Tobacco Use    Smoking status: Former Smoker     Packs/day: 0.25     Years: 15.00     Pack years: 3.75     Types: Cigarettes    Smokeless tobacco: Never Used   Substance Use Topics    Alcohol use: Not Currently           REVIEW OF SYSTEMS:  all other systems reviewed and are negative  Review of Systems   Constitutional: Negative for appetite change, chills, fever and unexpected weight change. HENT: Positive for postnasal drip, sore throat and trouble swallowing. Negative for congestion, dental problem, ear discharge, ear pain, facial swelling, hearing loss, rhinorrhea, sinus pressure, sinus pain, tinnitus and voice change. Eyes: Negative for photophobia and pain. Comments:     PHYSICAL EXAM:    /76   Ht 5' 6\" (1.676 m)   Wt 232 lb (105.2 kg)   BMI 37.45 kg/m²   Body mass index is 37.45 kg/m².     General Appearance: well developed  and well nourished  Head/ Face: normocephalic and atraumatic  Vocal Quality: good/ normal  Ears: Right Ear: External: external ears normal

## 2020-07-02 NOTE — TELEPHONE ENCOUNTER
Called pt and advised him of CT scan July 10th at 1pm. NPO 4 hours prior to test. Bring ins and ID cards to pre reg [facility]NP. Pt v/u.

## 2020-07-07 ENCOUNTER — OFFICE VISIT (OUTPATIENT)
Dept: PRIMARY CARE CLINIC | Age: 50
End: 2020-07-07
Payer: MEDICAID

## 2020-07-07 VITALS
RESPIRATION RATE: 18 BRPM | OXYGEN SATURATION: 96 % | BODY MASS INDEX: 35.07 KG/M2 | SYSTOLIC BLOOD PRESSURE: 112 MMHG | WEIGHT: 231.4 LBS | HEIGHT: 68 IN | TEMPERATURE: 98.2 F | HEART RATE: 83 BPM | DIASTOLIC BLOOD PRESSURE: 82 MMHG

## 2020-07-07 LAB
ALBUMIN SERPL-MCNC: 5.1 G/DL (ref 3.5–5.2)
ALP BLD-CCNC: 74 U/L (ref 40–130)
ALT SERPL-CCNC: 29 U/L (ref 5–41)
ANION GAP SERPL CALCULATED.3IONS-SCNC: 18 MMOL/L (ref 7–19)
AST SERPL-CCNC: 19 U/L (ref 5–40)
BILIRUB SERPL-MCNC: 1.1 MG/DL (ref 0.2–1.2)
BUN BLDV-MCNC: 18 MG/DL (ref 6–20)
CALCIUM SERPL-MCNC: 9.5 MG/DL (ref 8.6–10)
CHLORIDE BLD-SCNC: 99 MMOL/L (ref 98–111)
CHOLESTEROL, TOTAL: 191 MG/DL (ref 160–199)
CO2: 20 MMOL/L (ref 22–29)
CREAT SERPL-MCNC: 0.9 MG/DL (ref 0.5–1.2)
GFR NON-AFRICAN AMERICAN: >60
GLUCOSE BLD-MCNC: 119 MG/DL (ref 74–109)
HBA1C MFR BLD: 5.7 % (ref 4–6)
HDLC SERPL-MCNC: 46 MG/DL (ref 55–121)
LDL CHOLESTEROL CALCULATED: 126 MG/DL
POTASSIUM SERPL-SCNC: 4.2 MMOL/L (ref 3.5–5)
SODIUM BLD-SCNC: 137 MMOL/L (ref 136–145)
TOTAL PROTEIN: 7.7 G/DL (ref 6.6–8.7)
TRIGL SERPL-MCNC: 96 MG/DL (ref 0–149)

## 2020-07-07 PROCEDURE — 36415 COLL VENOUS BLD VENIPUNCTURE: CPT | Performed by: NURSE PRACTITIONER

## 2020-07-07 PROCEDURE — G8427 DOCREV CUR MEDS BY ELIG CLIN: HCPCS | Performed by: NURSE PRACTITIONER

## 2020-07-07 PROCEDURE — G8417 CALC BMI ABV UP PARAM F/U: HCPCS | Performed by: NURSE PRACTITIONER

## 2020-07-07 PROCEDURE — 1036F TOBACCO NON-USER: CPT | Performed by: NURSE PRACTITIONER

## 2020-07-07 PROCEDURE — G8926 SPIRO NO PERF OR DOC: HCPCS | Performed by: NURSE PRACTITIONER

## 2020-07-07 PROCEDURE — 3023F SPIROM DOC REV: CPT | Performed by: NURSE PRACTITIONER

## 2020-07-07 PROCEDURE — 99214 OFFICE O/P EST MOD 30 MIN: CPT | Performed by: NURSE PRACTITIONER

## 2020-07-07 RX ORDER — BUPROPION HYDROCHLORIDE 150 MG/1
TABLET ORAL
Qty: 30 TABLET | Refills: 3 | Status: SHIPPED | OUTPATIENT
Start: 2020-07-07 | End: 2020-10-26 | Stop reason: ALTCHOICE

## 2020-07-07 RX ORDER — ESCITALOPRAM OXALATE 10 MG/1
10 TABLET ORAL DAILY
Qty: 30 TABLET | Refills: 3 | Status: SHIPPED | OUTPATIENT
Start: 2020-07-07 | End: 2020-08-11 | Stop reason: DRUGHIGH

## 2020-07-07 ASSESSMENT — ENCOUNTER SYMPTOMS
RECTAL PAIN: 0
ABDOMINAL DISTENTION: 0
VOMITING: 0
CONSTIPATION: 0
NAUSEA: 0
DIARRHEA: 1
ABDOMINAL PAIN: 1
ANAL BLEEDING: 0
BLOOD IN STOOL: 0

## 2020-07-07 NOTE — PROGRESS NOTES
[facility]  [address]  [address] [address]  Dept: [phone]  Dept Fax: [phone]  Loc: [phone]    Tab Pena is a 54 y.o. male who presents today for his medical conditions/complaints as noted below. Tab Pena is c/o of 6 Month Follow-Up (pt quit smoking, pt stated that the wellbutrin is not working) and Diarrhea (pt states when he eats it goes right through him, any food )        HPI:     HPI   Chief Complaint   Patient presents with    6 Month Follow-Up     pt quit smoking, pt stated that the wellbutrin is not working    Diarrhea     pt states when he eats it goes right through him, any food    First the Wellbutrin did help and then we went up on the dose and he does not feel like it is working anymore. He did quit smoking with the use of it and patches. He feels like he is not motivated. He moved here to be with his girlfriend and they are getting along well and his sons moved here to be with him as well. Did see ENT about his abnormal neck x-ray that showed a mass in his epiglottis. They did not see anything on scope so they have offered a soft tissue CT which he has scheduled on Friday. He does admit that he has had some acid reflux and they started him on medication for this and he feels like it may be helping. as soon as he eats the last 2 months he has bm sometimes solid sometimes soft or liquid. No blood.  He has cramps as soon as eats and then has to go have BM  Past Medical History:   Diagnosis Date    COPD [facility]ch[facility] obstructive [facility] disease) ([facility])     CPAP (continuous positive airway pressure) dependence     10cm to 20cm    Hyperlipidemia     Hypertension     Obstructive sleep apnea     AHI: 84.1 night one and 67.3 night two, per HST, 1/2020    KEM (obstructive sleep apnea)       Past Surgical History:   Procedure Laterality Date    BACK SURGERY  2013    4 rods and 4 screws in back-[facility]  TONSILLECTOMY AND ADENOIDECTOMY         Vitals 7/7/2020 7/2/2020 6/22/2020 1/7/2020 41/57/2010   SYSTOLIC 305 044 291 412 013   DIASTOLIC 82 76 80 80 82   Site Left Upper Arm - - - -   Position Sitting - - - -   Cuff Size Large Adult - - - -   Pulse 83 - 86 90 86   Temp 98.2 - 98.4 98.2 99.3   Resp 18 - 16 18 16   SpO2 96 - 97 98 97   Weight 231 lb 6.4 oz 232 lb 230 lb 229 lb 218 lb 6.4 oz   Height 5' 8\" 5' 6\" 5' 6\" 5' 7.5\" 5' 7.5\"   BMI (wt*703/ht~2) 35.18 kg/m2 37.44 kg/m2 37.12 kg/m2 35.33 kg/m2 33.7 kg/m2       Family History   Problem Relation Age of Onset    Diabetes Mother     High Blood Pressure Mother     Sleep Apnea Mother     Prostate Cancer Maternal Grandfather        Social History     Tobacco Use    Smoking status: Former Smoker     Packs/day: 0.25     Years: 15.00     Pack years: 3.75     Types: Cigarettes    Smokeless tobacco: Never Used   Substance Use Topics    Alcohol use: Not Currently      Current Outpatient Medications   Medication Sig Dispense Refill    buPROPion (WELLBUTRIN XL) 150 MG extended release tablet Go down to 2 pills daily for 7 days (300mg) then down to 150mg once a day and add lexapro 30 tablet 3    escitalopram (LEXAPRO) 10 MG tablet Take 1 tablet by mouth daily 30 tablet 3    omeprazole (PRILOSEC) 20 MG delayed release capsule Take 1 capsule by mouth 2 times daily (before meals) 60 capsule 1    INCRUSE ELLIPTA 62.5 MCG/INH AEPB INHALE 1 PUFF INTO THE LUNGS DAILY 30 each 3    rOPINIRole (REQUIP) 0.5 MG tablet Take 1 q hs 30 tablet 3    atorvastatin (LIPITOR) 40 MG tablet Take 1 tablet by mouth daily 90 tablet 3    blood glucose test strips (ONE TOUCH ULTRA TEST) strip USE TO TEST TWICE DAILY 100 strip 3    lisinopril-hydrochlorothiazide (PRINZIDE;ZESTORETIC) 20-25 MG per tablet Take 1 tablet by mouth daily 30 tablet 5    albuterol sulfate  (90 Base) MCG/ACT inhaler Inhale 2 puffs into the lungs 4 times daily as needed for Wheezing 1 Inhaler 5    tiotropium (SPIRIVA HANDIHALER) 18 MCG inhalation capsule Inhale 1 capsule into the lungs daily 30 capsule 5    Lancets MISC 1 each by Does not apply route 2 times daily 100 each 5    glucose monitoring kit (FREESTYLE) monitoring kit 1 kit by Does not apply route daily 1 kit 0     No current facility-administered medications for this visit. No Known Allergies    Health Maintenance   Topic Date Due    Pneumococcal 0-64 years Vaccine (1 of 1 - PPSV23) 11/10/1978    HIV screen  11/10/1987    DTaP/Tdap/Td vaccine (1 - Tdap) 11/10/1991    Flu vaccine (1) 09/01/2020    A1C test (Diabetic or Prediabetic)  12/20/2020    Lipid screen  12/20/2020    Potassium monitoring  12/20/2020    Creatinine monitoring  12/20/2020    Hepatitis A vaccine  Aged Out    Hepatitis B vaccine  Aged Out    Hib vaccine  Aged Out    Meningococcal (ACWY) vaccine  Aged Out       Subjective:      Review of Systems   Constitutional: Positive for fatigue. Negative for fever. HENT: Negative. Gastrointestinal: Positive for abdominal pain (cramp) and diarrhea. Negative for abdominal distention, anal bleeding, blood in stool, constipation, nausea, rectal pain and vomiting. Gerd   Musculoskeletal: Positive for neck pain. Skin: Negative. Psychiatric/Behavioral: Positive for decreased concentration and dysphoric mood. Negative for hallucinations, self-injury, sleep disturbance and suicidal ideas. The patient is nervous/anxious. The patient is not hyperactive. Objective:     Physical Exam  Vitals signs and nursing note reviewed. Constitutional:       Appearance: He is well-developed. HENT:      Head: Normocephalic. Neck:      Musculoskeletal: Normal range of motion. Thyroid: No thyroid mass or thyromegaly. Cardiovascular:      Rate and Rhythm: Normal rate and regular rhythm. Heart sounds: Normal heart sounds.    Pulmonary:      Effort: Pulmonary effort is normal.      Breath sounds: Normal breath sounds. Abdominal:      General: Abdomen is protuberant. Bowel sounds are normal.      Palpations: Abdomen is soft. Tenderness: There is no right CVA tenderness, left CVA tenderness, guarding or rebound. Negative signs include Contreras's sign, Rovsing's sign, McBurney's sign, psoas sign and obturator sign. Skin:     General: Skin is warm and dry. Neurological:      Mental Status: He is alert and oriented to person, place, and time. Psychiatric:         Behavior: Behavior normal.         Thought Content: Thought content normal.         Judgment: Judgment normal.       /82 (Site: Left Upper Arm, Position: Sitting, Cuff Size: Large Adult)   Pulse 83   Temp 98.2 °F (36.8 °C) (Temporal)   Resp 18   Ht 5' 8\" (1.727 m)   Wt 231 lb 6.4 oz (105 kg)   SpO2 96%   BMI 35.18 kg/m²     Assessment:       Diagnosis Orders   1. Chronic obstructive pulmonary disease, unspecified COPD type ([facility]     2. Depression with anxiety     3. Hyperlipidemia, unspecified hyperlipidemia type  Comprehensive Metabolic Panel    Lipid Panel   4. Pharyngoesophageal dysphagia     5. Mass of epiglottis     6. Elevated blood sugar  Comprehensive Metabolic Panel    Hemoglobin A1C   7. Change in bowel function  DOROTHY Weaver, Gastroenterology, [place]   8. Gastroesophageal reflux disease with esophagitis  DOROTHY Weaver, Gastroenterology, [place]         Plan:   More than 50% of the time was spent counseling and coordinating care for a total time of 35min face to face. I reviewed his notes from the ENT and the fact that he did have some irritation likely from GERD I am going to go ahead and set him up with GI in the noon change in bowel habits as well with GI. He is fasting we did do his blood work today. See the plan below and changing his medication. Patient given educational materials -see patient instructions. Discussed use, benefit, and side effects of prescribed medications.   All patient questions answered. Pt voiced understanding. Reviewed health maintenance. Instructed to continue currentmedications, diet and exercise. Patient agreed with treatment plan. Follow up as directed. MEDICATIONS:  Orders Placed This Encounter   Medications    buPROPion (WELLBUTRIN XL) 150 MG extended release tablet     Sig: Go down to 2 pills daily for 7 days (300mg) then down to 150mg once a day and add lexapro     Dispense:  30 tablet     Refill:  3    escitalopram (LEXAPRO) 10 MG tablet     Sig: Take 1 tablet by mouth daily     Dispense:  30 tablet     Refill:  3         ORDERS:  Orders Placed This Encounter   Procedures    Comprehensive Metabolic Panel    Lipid Panel    Hemoglobin A1C   [facility] DOROTHY Stewart, Gastroenterology, [place]       Follow-up:  Return in about 4 weeks (around 8/4/2020) for depression. PATIENT INSTRUCTIONS:  Patient Instructions   Have CT on neck for ENt  Refer to Gi for change in bowel habits and acid reflux  Keep taking the omeprazole     on wellbutrin Go down to 2 pills daily for 7 days (300mg) then down to 150mg once a day and add lexapro 10mg. You will stay on both the wellbutrin 150mg and lexapro till you see me again. Call if any side effects    Electronically signed by DOROTHY Oh - CNP on 7/7/2020 at 11:02 AM    EMR Dragon/transcription disclaimer:  Much of thisencounter note is electronic transcription/translation of spoken language to printed texts. The electronic translation of spoken language may be erroneous, or at times, nonsensical words or phrases may be inadvertentlytranscribed.   Although I have reviewed the note for such errors, some may still exist.

## 2020-07-07 NOTE — PATIENT INSTRUCTIONS
Have CT on neck for ENt  Refer to Gi for change in bowel habits and acid reflux  Keep taking the omeprazole     on wellbutrin Go down to 2 pills daily for 7 days (300mg) then down to 150mg once a day and add lexapro 10mg. You will stay on both the wellbutrin 150mg and lexapro till you see me again.  Call if any side effects

## 2020-07-10 RX ORDER — ALBUTEROL SULFATE 90 UG/1
AEROSOL, METERED RESPIRATORY (INHALATION)
Qty: 8.5 G | Refills: 1 | Status: SHIPPED | OUTPATIENT
Start: 2020-07-10 | End: 2020-10-05 | Stop reason: ALTCHOICE

## 2020-07-14 ENCOUNTER — TELEMEDICINE (OUTPATIENT)
Dept: GASTROENTEROLOGY | Age: 50
End: 2020-07-14
Payer: MEDICAID

## 2020-07-14 PROBLEM — R19.7 DIARRHEA: Status: ACTIVE | Noted: 2020-07-14

## 2020-07-14 PROBLEM — R12 CHRONIC HEARTBURN: Status: ACTIVE | Noted: 2020-07-14

## 2020-07-14 PROBLEM — R19.4 CHANGE IN BOWEL HABITS: Status: ACTIVE | Noted: 2020-07-14

## 2020-07-14 PROBLEM — R13.10 DYSPHAGIA: Status: ACTIVE | Noted: 2020-07-14

## 2020-07-14 PROCEDURE — 99203 OFFICE O/P NEW LOW 30 MIN: CPT | Performed by: NURSE PRACTITIONER

## 2020-07-14 PROCEDURE — G8427 DOCREV CUR MEDS BY ELIG CLIN: HCPCS | Performed by: NURSE PRACTITIONER

## 2020-07-14 ASSESSMENT — ENCOUNTER SYMPTOMS
ANAL BLEEDING: 0
SORE THROAT: 0
COUGH: 0
VOMITING: 0
NAUSEA: 0
ABDOMINAL PAIN: 0
DIARRHEA: 1
TROUBLE SWALLOWING: 1
BACK PAIN: 0
RECTAL PAIN: 0
ABDOMINAL DISTENTION: 0
SHORTNESS OF BREATH: 0
VOICE CHANGE: 0
BLOOD IN STOOL: 0
CONSTIPATION: 0

## 2020-07-14 NOTE — PATIENT INSTRUCTIONS
You are going to have an Endoscopy and here are some basic instructions:    Nothing to eat or drink after midnight EXCEPT:  PLEASE TAKE MEDICATION(S) FOR HIGH BLOOD PRESSURE, SEIZURES, HEART, AND THYROID WITH A SIP OF WATER AT LEAST 2 HOURS PRIOR TO ARRIVAL TIME.   YOU MAY ALSO TAKE ANY INHALERS YOU ARE PRESCRIBED. You will not be able to drive for 24 hours after the procedure due to sedation. Bring a  with you the day of the procedure. No aspirin, ibuprofen, naproxen, fish oil or vitamin E for 5 days before procedure. Continue current medications. If you are on blood thinners, clearance from the prescribing physician will be obtained before your procedure is scheduled. [email or web address] may be associated with discontinuation of your blood thinner and include, but not limited to, stroke, TIA, or cardiac event. If biopsies are taken during the procedure they will be sent to a pathologist for analysis. You will be notified by mail of the pathology results in 2-3 weeks. Your physician may also schedule a follow up appointment with the nurse practitioner to discuss pathology, symptoms or to check if you have had any problems related to your procedure. If you prefer not to return to the office after your procedure please discuss this with your physician on the day of your procedure. You are going to have a colonoscopy and here are some instructions: You will be given specific directions regarding restrictions to diet and bowel prep instructions including laxatives. Please read these instructions one week prior to your scheduled procedure to ensure that you are prepared. Follow prep instructions provided for bowel prep. Take all of the bowel prep as directed. If you are having problems with nausea, stop your prep for 30-45 min to allow the nausea to subside before resuming your prep.      Nothing to eat or drink after midnight the day of the procedure EXCEPT:  PLEASE TAKE soups    Foods and drinks to avoid  Avoid foods that contain too much fiber. Stay clear of dark colored beverages. They can stick to the walls of the digestive tract and make it difficult to differentiate from blood. Some of these foods are:  Red meat, rice, nuts and vegetables   Milk, other milk based fluids and cream   Most fruit and puddings   Whole grain pasta   Cereals, bran and seeds   Colored beverages, especially those that are red or purple in color   Red colored Jell-O   On the day before the colonoscopy, continue to drink plenty of clear fluids. It is important   to keep yourself hydrated before the exam.     Please follow all instructions as provided for cleansing the bowel. Failure to have an adequately prepped colon may cause you to have incomplete exam with further testing required.

## 2020-07-14 NOTE — PROGRESS NOTES
2020     Pt location: pt home    TELEHEALTH EVALUATION -- Audio/Visual (During NCLSI-84 public health emergency)    HPI:    Chris Benítez (:  1970) has requested an audio/video evaluation for the following concern(s):    New pt referral.  From PCP. For change in bowel habits. Reports he eats and has urgency and has an immediate stool, most of the time its diarrhea but sometimes it is formed. Started abruptly 2 months ago. Prior to when this started he had one good formed BM daily. No blood in stool. No weight loss. No medication changes over the past 6 months, per pt. No recent travel. No recent surgeries. C/o dysphagia. Chronic all of his life. Occurs with foods only. Particularly rice. He reports all of this teeth were removed in Dec 2019 but he has had this problem all of his life and the removal of his teeth hasn't made anything worse. Has chronic heartburn. For 2-3 months. His PCP prescribed prilosec. And this works well to control it. GI scope reports in history per MA per OV policy, I reviewed this. Review of Systems   Constitutional: Negative for appetite change, fatigue, fever and unexpected weight change. HENT: Positive for trouble swallowing. Negative for sore throat and voice change. Respiratory: Negative for cough and shortness of breath. Cardiovascular: Negative for chest pain, palpitations and leg swelling. Gastrointestinal: Positive for diarrhea. Negative for abdominal distention, abdominal pain, anal bleeding, blood in stool, constipation, nausea, rectal pain and vomiting. Genitourinary: Negative for hematuria. Musculoskeletal: Negative for arthralgias, back pain and neck pain. Neurological: Negative for dizziness, weakness, light-headedness and headaches. Psychiatric/Behavioral: Negative for dysphoric mood and sleep disturbance. The patient is not nervous/anxious. All other systems reviewed and are negative.       Prior to Visit Medications    Medication Sig Taking?  Authorizing Provider   albuterol sulfate  (90 Base) MCG/ACT inhaler INHALE 2 PUFFS INTO THE LUNGS FOUR TIMES DAILY AS NEEDED FOR WHEEZING  Donna DOROTHY Vaughan CNP   buPROPion (WELLBUTRIN XL) 150 MG extended release tablet Go down to 2 pills daily for 7 days (300mg) then down to 150mg once a day and add lexapro  Иван Rings, DOROTHY Medeiros CNP   escitalopram (LEXAPRO) 10 MG tablet Take 1 tablet by mouth daily  Иван Rings, APRN - CNP   omeprazole (PRILOSEC) 20 MG delayed release capsule Take 1 capsule by mouth 2 times daily (before meals)  Jovan Lindo PA-C   INCRUSE ELLIPTA 62.5 MCG/INH AEPB INHALE 1 PUFF INTO THE LUNGS DAILY  Иван Rings, APRN - CNP   rOPINIRole (REQUIP) 0.5 MG tablet Take 1 q hs  VIVIANA Martinez   atorvastatin (LIPITOR) 40 MG tablet Take 1 tablet by mouth daily  Иван Rings, DOROTHY Medeiros CNP   blood glucose test strips (ONE TOUCH ULTRA TEST) strip USE TO TEST TWICE DAILY  Rosa Arriaga, APRN - CNP   lisinopril-hydrochlorothiazide (PRINZIDE;ZESTORETIC) 20-25 MG per tablet Take 1 tablet by mouth daily  Иван Rings, APRN - CNP   tiotropium (Nneka Dear) 18 MCG inhalation capsule Inhale 1 capsule into the lungs daily  Иван Rings, DOROTHY Medeiros CNP   Lancets MISC 1 each by Does not apply route 2 times daily  Иван Rings, DOROTHY Medeiros CNP   glucose monitoring kit (FREESTYLE) monitoring kit 1 kit by Does not apply route daily  Иван Rings, DOROTHY Medeiros CNP       Social History     Tobacco Use    Smoking status: Former Smoker     Packs/day: 0.25     Years: 15.00     Pack years: 3.75     Types: Cigarettes    Smokeless tobacco: Never Used   Substance Use Topics    Alcohol use: Not Currently    Drug use: Not Currently        PHYSICAL EXAMINATION:  [ INSTRUCTIONS:  \"[x]\" Indicates a positive item  \"[]\" Indicates a negative item  -- DELETE ALL ITEMS NOT EXAMINED]  Vital Signs: (As obtained by patient/caregiver or practitioner observation)    Blood pressure-  Heart rate-    Respiratory rate-    Temperature-  Pulse oximetry-     Constitutional: [x] Appears well-developed and well-nourished [x] No apparent distress      [] Abnormal-   Mental status  [x] Alert and awake  [x] Oriented to person/place/time [x]Able to follow commands      Eyes:  EOM    [x]  Normal  [] Abnormal-  Sclera  [x]  Normal  [] Abnormal -         Discharge [x]  None visible  [] Abnormal -    HENT:   [x] Normocephalic, atraumatic. [] Abnormal   [x] Mouth/Throat: Mucous membranes are moist.     External Ears [x] Normal  [] Abnormal-     Neck: [x] No visualized mass     Pulmonary/Chest: [x] Respiratory effort normal.  [x] No visualized signs of difficulty breathing or respiratory distress        [] Abnormal-      Musculoskeletal:   [x] Normal gait with no signs of ataxia         [x] Normal range of motion of neck        [] Abnormal-       Neurological:        [] No Facial Asymmetry (Cranial nerve 7 motor function) (limited exam to video visit)          [x] No gaze palsy        [] Abnormal-         Skin:        [x] No significant exanthematous lesions or discoloration noted on facial skin         [] Abnormal-            Psychiatric:       [x] Normal Affect [x] No Hallucinations        [] Abnormal-     Other pertinent observable physical exam findings-     ASSESSMENT/PLAN:  1. Change in bowel habits  - COLONOSCOPY W/ OR W/O BIOPSY; Future    2. Diarrhea, unspecified type  - COLONOSCOPY W/ OR W/O BIOPSY with random colon biopsies  -pt will  a diatherix stool kit from our office and we will call him with results    3. Dysphagia, unspecified type  - ESOPHAGOSCOPY / EGD- r/o EoE and h pylori    4. Chronic heartburn  - ESOPHAGOSCOPY / EGD; Future      No follow-ups on file. Jessica Tamez is a 54 y.o. male being evaluated by a Virtual Visit (video visit) encounter to address concerns as mentioned above. A caregiver was present when appropriate.  Due to this being a TeleHealth encounter (During SQNHY-13 public health emergency), evaluation of the following organ systems was limited: Vitals/Constitutional/EENT/Resp/CV/GI//MS/Neuro/Skin/Heme-Lymph-Imm. Pursuant to the emergency declaration under the [address] [place] [address], [address] [place] [address] and the [employer] Act, this Virtual Visit was conducted with patient's (and/or legal guardian's) consent, to reduce the patient's risk of exposure to COVID-19 and provide necessary medical care. The patient (and/or legal guardian) has also been advised to contact this office for worsening conditions or problems, and seek emergency medical treatment and/or call 911 if deemed necessary. Patient identification was verified at the start of the visit: YES    Total time spent on this encounter:not billed based on time    Services were provided through a video synchronous discussion virtually to substitute for in-person clinic visit. Patient and provider were located at their individual homes. --DOROTHY Stephenson on 7/14/2020 at 10:16 AM    An electronic signature was used to authenticate this note.

## 2020-07-29 ENCOUNTER — HOSPITAL ENCOUNTER (OUTPATIENT)
Dept: CT IMAGING | Age: 50
Discharge: HOME | End: 2020-07-29
Payer: MEDICAID

## 2020-07-29 PROCEDURE — 6360000004 HC RX CONTRAST MEDICATION: Performed by: NURSE PRACTITIONER

## 2020-07-29 PROCEDURE — 70491 CT SOFT TISSUE NECK W/DYE: CPT

## 2020-07-29 RX ADMIN — IOPAMIDOL 90 ML: 755 INJECTION, SOLUTION INTRAVENOUS at 08:50

## 2020-07-31 ENCOUNTER — VIRTUAL VISIT (OUTPATIENT)
Dept: PRIMARY CARE CLINIC | Age: 50
End: 2020-07-31
Payer: MEDICAID

## 2020-07-31 ENCOUNTER — OFFICE VISIT (OUTPATIENT)
Age: 50
End: 2020-07-31

## 2020-07-31 VITALS — OXYGEN SATURATION: 98 % | TEMPERATURE: 97.8 F | HEART RATE: 90 BPM

## 2020-07-31 LAB — SARS-COV-2, PCR: DETECTED

## 2020-07-31 PROCEDURE — 99442 PR PHYS/QHP TELEPHONE EVALUATION 11-20 MIN: CPT | Performed by: NURSE PRACTITIONER

## 2020-07-31 RX ORDER — SUCRALFATE 1 G/1
1 TABLET ORAL 4 TIMES DAILY
Qty: 40 TABLET | Refills: 0 | Status: SHIPPED | OUTPATIENT
Start: 2020-07-31 | End: 2020-08-18 | Stop reason: SDUPTHER

## 2020-07-31 NOTE — PROGRESS NOTES
Kai Adams is a 54 y.o. male evaluated via telephone on 7/31/2020. Consent:   He and/or health care decision maker is aware that that he may receive a bill for this telephone service, depending on his insurance coverage, and has provided verbal consent to proceed: Yes      Documentation:  I communicated with the patient and/or health care decision maker about positive covid 19. The patient was supposed to have an endoscopy and a colonoscopy on Monday and had a COVID test today. He got the results back which were positive. Evidently the flu clinic called him and at the time he was very upset and hung up on them. I called and spoke to him and his significant other, Alla, on the phone. The patient was just upset because all of their kids had it about 3 weeks ago and they stayed quarantine for 2 weeks and did everything they were supposed to do including sanitizing not to catch it and he is asymptomatic and now has it. He does admit that sometime last week he did lose his taste and smell for about 3 days but he never had any fever congestion or cough. He feels fine right now. The only thing that is bothering him is his stomach and he was hoping to get some answers with his endoscopy on Monday. He is still on his stomach medication. I did discuss with him I would send some Carafate in over the weekend if we could use a pharmacy in their area that would deliver so we did use [facility]'s pharmacy. We discussed if he remains asymptomatic he may repeat the COVID test at the end of next week and see if he is negative so that we could get him scheduled again for the endoscopy colonoscopy. If he has a fever and worsening symptoms he is to call me. He is to quarantine at home until he has the repeat test.  Health department should be contacting him soon. Is very possible that he did contact the infection from his children during that time 3 weeks ago and just now testing positive.     Details of this discussion including any medical advice provided:   COVID positive quarantine and be retested next Thursday or Friday to see if he test negative. If so we can move his endoscopy/colonoscopy up. We are going to add Carafate to his medication regimen. I will cancel his appointment for next week with me and we can reschedule that later. Discussed all the reasons to go to the emergency room over the weekend if they worsen. I affirm this is a Patient Initiated Episode with a Patient who has not had a related appointment within my department in the past 7 days or scheduled within the next 24 hours.     Patient identification was verified at the start of the visit: Yes    Total Time: minutes: 11-20 minutes    Note: not billable if this call serves to triage the patient into an appointment for the relevant concern      Cecil Foster

## 2020-08-11 ENCOUNTER — PATIENT MESSAGE (OUTPATIENT)
Dept: PRIMARY CARE CLINIC | Age: 50
End: 2020-08-11

## 2020-08-11 RX ORDER — ESCITALOPRAM OXALATE 20 MG/1
20 TABLET ORAL DAILY
Qty: 30 TABLET | Refills: 3 | Status: SHIPPED
Start: 2020-08-11 | End: 2020-08-25 | Stop reason: SINTOL

## 2020-08-11 RX ORDER — ALBUTEROL SULFATE 1.25 MG/3ML
1 SOLUTION RESPIRATORY (INHALATION) EVERY 6 HOURS PRN
Qty: 120 ML | Refills: 3 | Status: SHIPPED | OUTPATIENT
Start: 2020-08-11 | End: 2020-10-05 | Stop reason: ALTCHOICE

## 2020-08-11 NOTE — TELEPHONE ENCOUNTER
From: Joyce Arce  To: DOROTHY Zhang - CNP  Sent: 8/11/2020 11:28 AM CDT  Subject: Prescription Question    I have run out the Albuterol breathing treatment medicine I have been taking 2x a day to help with my breathing in addition to the other inhalers you gave me. Crystal is out of her prescription and has no more for me to use. It is helping me produce a yellow mucus that I spit up after and while I'm taking the treatment. Can u send a prescription to [facility] as we have switched pharmacies now. Also I'm having alot of anxiety since I am taking the 2 meds for. Can we please fix. this.   Jacinda Herrera

## 2020-08-13 ENCOUNTER — TELEPHONE (OUTPATIENT)
Dept: NEUROLOGY | Age: 50
End: 2020-08-13

## 2020-08-13 NOTE — TELEPHONE ENCOUNTER
Called the patient and left him a voicemail letting him know that he can't come into the office tomorrow and we will either have to reschedule his appointment or change it to a video visit. I left our number for him to call us back and let us know what he would prefer. He must have a negative COVID test before he can be seen in the office.

## 2020-08-17 ENCOUNTER — VIRTUAL VISIT (OUTPATIENT)
Dept: PRIMARY CARE CLINIC | Age: 50
End: 2020-08-17
Payer: MEDICAID

## 2020-08-17 PROCEDURE — G8428 CUR MEDS NOT DOCUMENT: HCPCS | Performed by: NURSE PRACTITIONER

## 2020-08-17 PROCEDURE — 99213 OFFICE O/P EST LOW 20 MIN: CPT | Performed by: NURSE PRACTITIONER

## 2020-08-17 ASSESSMENT — ENCOUNTER SYMPTOMS
COUGH: 1
WHEEZING: 1
SHORTNESS OF BREATH: 1

## 2020-08-18 RX ORDER — CYCLOBENZAPRINE HCL 10 MG
10 TABLET ORAL 3 TIMES DAILY PRN
Qty: 30 TABLET | Refills: 1 | Status: SHIPPED | OUTPATIENT
Start: 2020-08-18 | End: 2020-08-25 | Stop reason: SDUPTHER

## 2020-08-18 RX ORDER — SUCRALFATE 1 G/1
1 TABLET ORAL 4 TIMES DAILY
Qty: 40 TABLET | Refills: 0 | Status: SHIPPED | OUTPATIENT
Start: 2020-08-18 | End: 2020-08-25 | Stop reason: SDUPTHER

## 2020-08-20 RX ORDER — ROPINIROLE 0.5 MG/1
TABLET, FILM COATED ORAL
Qty: 30 TABLET | Refills: 3 | Status: SHIPPED | OUTPATIENT
Start: 2020-08-20 | End: 2021-01-04 | Stop reason: SDUPTHER

## 2020-08-25 RX ORDER — SUCRALFATE 1 G/1
1 TABLET ORAL 4 TIMES DAILY
Qty: 120 TABLET | Refills: 1 | Status: ON HOLD | OUTPATIENT
Start: 2020-08-25 | End: 2020-09-24 | Stop reason: ALTCHOICE

## 2020-08-25 RX ORDER — CYCLOBENZAPRINE HCL 10 MG
10 TABLET ORAL 3 TIMES DAILY PRN
Qty: 30 TABLET | Refills: 1 | Status: SHIPPED | OUTPATIENT
Start: 2020-08-25 | End: 2020-09-04

## 2020-08-28 ENCOUNTER — TELEMEDICINE (OUTPATIENT)
Dept: NEUROLOGY | Age: 50
End: 2020-08-28
Payer: MEDICAID

## 2020-08-28 PROCEDURE — 99214 OFFICE O/P EST MOD 30 MIN: CPT | Performed by: PHYSICIAN ASSISTANT

## 2020-08-28 PROCEDURE — G8427 DOCREV CUR MEDS BY ELIG CLIN: HCPCS | Performed by: PHYSICIAN ASSISTANT

## 2020-08-28 PROCEDURE — G8417 CALC BMI ABV UP PARAM F/U: HCPCS | Performed by: PHYSICIAN ASSISTANT

## 2020-08-28 PROCEDURE — 1036F TOBACCO NON-USER: CPT | Performed by: PHYSICIAN ASSISTANT

## 2020-08-28 NOTE — PROGRESS NOTES
2020    TELEHEALTH EVALUATION -- Audio/Visual (During PQXUQ-30 public health emergency)      Patient:   Chris Benítez  MR#:    [id number]  Account Number:                         YOB: 1970  Primary/Referring Physician:  DOROTHY Danielson CNP   Consulting Physician:   Holger Valerio PA-C    NEW PATIENT CONSULTATION    OR    FOLLOW UP    Chris Benítez is located at:  Home  Also present during visit:  Nobody  Provider is located at [facility] in [place], [place]    Chief Complaint   Patient presents with    Follow-up    Sleep Apnea       HPI:    Chris Benítez (:  1970) has requested an audio/video evaluation for the following concern(s): Sleep clinic follow up      Chris Benítez is a 54 y.o. male who has a history of severe KEM, RLS, and hypoxemia. He was referred for an HST due to his snoring, witnessed apneas, and excessive daytime somnolence. He was diagosed with KEM in  and prescribed CPAP and supplemental O2. His machine malfunctioned and he stopped using it. The HST, 1/15/2010 and 2020 revealed an AHI of 84.1 and 67.3  He had O2 desaturations as low as 58% night one and  72% night 2. Chris Benítez is prescribed auto CPAP with a pressure range of 10cm to 20cm. At his last office visit, he continued to be symptomatic for KEM and was referred for a titration study. The auto CPAP pressure is set at 10cm to 20cm. The compliance report indicates that he has only used CPAP 12/30 days. He was diagnosed with COVID-19, 2 weeks ago. He can't breathe with the mask on.  He is going to attempts to use it as his health condition improves.      Location or symptom:  KEM  Onset: Initial dx KEM/hypoxemia requiring nocturnal supplemental O2 in ; HST; 1/15/2020 and 2020, 2020-titration study  Timing:  q hs  Severity:  Severe  Associated:  Snoring, witnessed apneas, and excessive daytime somnolence  Alleviated:  CPAP      Past Medical History:   Diagnosis Date    COPD (chronic obstructive pulmonary disease) (HCC)     CPAP (continuous positive airway pressure) dependence     10cm to 20cm    Hyperlipidemia     Hypertension     Obstructive sleep apnea     AHI: 84.1 night one and 67.3 night two, per HST, 1/2020    KEM (obstructive sleep apnea)        Past Surgical History:   Procedure Laterality Date    BACK SURGERY  2013    4 rods and 4 screws in back-[facility]    COLONOSCOPY  2008    [facility]- pt unsure of results or whom    TONSILLECTOMY AND ADENOIDECTOMY         Family History   Problem Relation Age of Onset    Diabetes Mother     High Blood Pressure Mother     Sleep Apnea Mother     Prostate Cancer Maternal Grandfather     Colon Cancer Neg Hx     Colon Polyps Neg Hx        Social History     Socioeconomic History    Marital status: Single     Spouse name: Not on file    Number of children: Not on file    Years of education: Not on file    Highest education level: Not on file   Occupational History    Not on file   Social Needs    Financial resource strain: Not on file    Food insecurity     Worry: Not on file     Inability: Not on file   [employer] needs     Medical: Not on file     Non-medical: Not on file   Tobacco Use    Smoking status: Former Smoker     Packs/day: 0.25     Years: 15.00     Pack years: 3.75     Types: Cigarettes    Smokeless tobacco: Never Used   Substance and Sexual Activity    Alcohol use: Not Currently    Drug use: Not Currently    Sexual activity: Yes     Partners: Female   Lifestyle    Physical activity     Days per week: Not on file     Minutes per session: Not on file    Stress: Not on file   Relationships    Social connections     Talks on phone: Not on file     Gets together: Not on file     Attends Jewish service: Not on file     Active member of club or organization: Not on file     Attends meetings of clubs or organizations: Not on file     Relationship status: Not on file    Intimate partner violence     Fear of current or ex partner: Not on file     Emotionally abused: Not on file     Physically abused: Not on file     Forced sexual activity: Not on file   Other Topics Concern    Not on file   Social History Narrative    Not on file       Current Outpatient Medications   Medication Sig Dispense Refill    cyclobenzaprine (FLEXERIL) 10 MG tablet Take 1 tablet by mouth 3 times daily as needed for Muscle spasms May make drowsy 30 tablet 1    sucralfate (CARAFATE) 1 GM tablet Take 1 tablet by mouth 4 times daily for 10 days 120 tablet 1    ARIPiprazole (ABILIFY) 5 MG tablet Take 1 tablet by mouth daily Stop the lexapro 30 tablet 3    rOPINIRole (REQUIP) 0.5 MG tablet Take 1 q hs 30 tablet 3    albuterol (ACCUNEB) 1.25 MG/3ML nebulizer solution Inhale 3 mLs into the lungs every 6 hours as needed for Wheezing covidi 19 positive, fyi 120 mL 3    albuterol sulfate  (90 Base) MCG/ACT inhaler INHALE 2 PUFFS INTO THE LUNGS FOUR TIMES DAILY AS NEEDED FOR WHEEZING 8.5 g 1    buPROPion (WELLBUTRIN XL) 150 MG extended release tablet Go down to 2 pills daily for 7 days (300mg) then down to 150mg once a day and add lexapro 30 tablet 3    omeprazole (PRILOSEC) 20 MG delayed release capsule Take 1 capsule by mouth 2 times daily (before meals) 60 capsule 1    INCRUSE ELLIPTA 62.5 MCG/INH AEPB INHALE 1 PUFF INTO THE LUNGS DAILY 30 each 3    atorvastatin (LIPITOR) 40 MG tablet Take 1 tablet by mouth daily 90 tablet 3    blood glucose test strips (ONE TOUCH ULTRA TEST) strip USE TO TEST TWICE DAILY 100 strip 3    lisinopril-hydrochlorothiazide (PRINZIDE;ZESTORETIC) 20-25 MG per tablet Take 1 tablet by mouth daily 30 tablet 5    tiotropium (SPIRIVA HANDIHALER) 18 MCG inhalation capsule Inhale 1 capsule into the lungs daily 30 capsule 5    Lancets MISC 1 each by Does not apply route 2 times daily 100 each 5    glucose monitoring kit (FREESTYLE) monitoring kit 1 kit by Does not apply route daily 1 kit 0 No current facility-administered medications for this visit. No Known Allergies    REVIEW OF SYSTEMS     Constitutional: []? Fever []? Sweats []? Chills []? Recent Injury   [x]? Denies all unless marked  HENT:[]? Headache  []? Head Injury  []? Sore Throat  []? Ear Pain  []? Dizziness []? Hearing Loss   [x]? Denies all unless marked  Spine:  []? Neck pain  []? Back pain  []? Sciaticia  [x]? Denies all unless marked  Cardiovascular:[]? Chest Pain []? Palpitations []? Heart Disease  [x]? Denies all unless marked  Pulmonary: []? Shortness of Breath []? Cough   [x]? Denies all unless marked  Gastrointestinal:  []? Abdominal Pain  []? Blood in Stool  []? Diarrhea []? Constipation []? Nausea  []? Vomiting  [x]? Denies all unless marked  Genitourinary:  []? Dysuria []? Frequency  []? Incontinence []? Urgency   [x]? Denies all unless marked  Musculoskeletal: []? Arthralgia  []? Myalgias []? Muscle cramps  []? Muscle twitches   [x]? Denies all unless marked   Extremities:   []? Pain   []? Swelling   [x]? Denies all unless marked  Skin:[]? Rash  []? Color Change  [x]? Denies all unless marked  Neurological:[]? Visual Disturbance []? Double Vision []? Slurred Speech []? Trouble swallowing  []? Vertigo []? Tingling []? Numbness []? Weakness []? Loss of Balance   []? Loss of Consciousness []? Memory Loss []? Seizures  [x]? Denies all unless marked  Psychiatric/Behavioral:[]? Depression []? Anxiety  [x]? Denies all unless marked  Sleep: []? Insomnia []? Sleep Disturbance []? Snoring [x]? Restless Legs []? Daytime Sleepiness [x]? Sleep Apnea  []? Denies all unless marked    The MA has completed the ROS with the patient. I have reviewed it in its' entirety with the patient and agree with the documentation. PHYSICAL EXAMINATION:  Constitutional -   General appearance: Alert in no acute distress, well nourished, and  well developed. EYES -   Sclera and lids appears normal.  ENT-    Hearing intact.  Ears and external nose on visible 07/07/2020    CO2 20 (L) 07/07/2020    BUN 18 07/07/2020    CREATININE 0.9 07/07/2020    GLUCOSE 119 (H) 07/07/2020    CALCIUM 9.5 07/07/2020    PROT 7.7 07/07/2020    LABALBU 5.1 07/07/2020    BILITOT 1.1 07/07/2020    ALKPHOS 74 07/07/2020    AST 19 07/07/2020    ALT 29 07/07/2020    LABGLOM >60 07/07/2020             I reviewed the following studies:       [x]  :  Clinical laboratory test results    []  :  Radiology reports    [x]  :  Review and summarization of medical records and/or obtain medical records     [x]  :  Previous/recent polysomnogram report(s)    []  :  [place] Sleepiness Scale         [x]  :  Compliance download: The auto CPAP is set at a pressure range of 8cm to 12cm. Compliance download shows that he uses device: 40% of the time; percentage of days with usage >=4 hours: 17%. AHI: 1.1    ASSESSMENT:    Eran Sosa is a 54y.o. year old male evaluated via Telehealth encounter for evaluation of PAP efficacy and compliance. ICD-10-CM    1. Obstructive sleep apnea  G47.33    2. Restless leg syndrome  G25.81    3. CPAP (continuous positive airway pressure) dependence  Z99.89           []  :  Stable     []  :  Improved                       [x]  :  Well controlled- with CPAP usage low residual AHI; but he is non-compliant with 12/30 days CPAP usage; he has not been using it since he was diagnosed with COVID-19            []  :  Resolving     []  :  Resolved     []  :  Inadequately controlled     []  :  Worsening     []  :  Additional workup planned        PLAN:  No orders of the defined types were placed in this encounter. 1.   Reviewed the etiology,  pathophysiology, diagnosis, treatment options, and risks of untreated KEM. Risks may include, but are not limited to  hypertension, coronary artery disease, diabetes, stroke, weight gain, impaired cognition, daytime somnolence,  and motor vehicle accidents.  Advised to abstain from driving or operating heavy machinery when drowsy and the use of respiratory suppressants. Will evaluate for clinical benefit and compliance during a 30 day period within the preceding 90 days if prescribed PAP therapy. 2.  The following educational material has been included in this visit after visit summary for your review: KEM/PAP guidelines-Discussed with the patient and all questions fully answered. 3.  Use CPAP when able  4. Order-supplies-[facility]  5. Keep follow up with Pulmonologist, next week  6. Follow up in 1 month      Pursuant to the emergency declaration under the [address] [place] [address] and the [employer] Act, this Virtual  Visit was conducted, with patient's consent, to reduce the patient's risk of exposure to COVID-19 and provide continuity of care for an established patient. Services were provided through a video synchronous discussion virtually to substitute for in-person clinic visit.

## 2020-08-28 NOTE — PATIENT INSTRUCTIONS
Patient education: Sleep apnea in adults       INTRODUCTION -- Normally during sleep, air moves through the throat and in and out of the lungs at a regular rhythm. In a person with sleep apnea, air movement is periodically diminished or stopped. There are two types of sleep apnea: obstructive sleep apnea and central sleep apnea. In obstructive sleep apnea, breathing is abnormal because of narrowing or closure of the throat. In central sleep apnea, breathing is abnormal because of a change in the breathing control and rhythm. Sleep apnea is a serious condition that can affect a person's ability to safely perform normal daily activities and can affect long term health. Approximately 25 percent of adults are at risk for sleep apnea of some degree. Men are more commonly affected than women. Other risk factors include middle and older age, being overweight or obese, and having a small mouth and throat. This topic review focuses on the most common type of sleep apnea in adults, obstructive sleep apnea (KEM). HOW SLEEP APNEA OCCURS -- The throat is surrounded by muscles that control the airway for speaking, swallowing, and breathing. During sleep, these muscles are less active, and this causes the throat to narrow. In most people, this narrowing does not affect breathing. In others, it can cause snoring, sometimes with reduced or completely blocked airflow. A completely blocked airway without airflow is called an obstructive apnea. Partial obstruction with diminished airflow is called a hypopnea. A person may have apnea and hypopnea during sleep. Insufficient breathing due to apnea or hypopnea causes oxygen levels to fall and carbon dioxide to rise. Because the airway is blocked, breathing faster or harder does not help to improve oxygen levels until the airway is reopened. Typically, the obstruction requires the person to awaken to activate the upper airway muscles.  Once the airway is opened, the person then takes several deep breaths to catch up on breathing. As the person awakens, he or she may move briefly, snort or snore, and take a deep breath. Less frequently, a person may awaken completely with a sensation of gasping, smothering, or choking. If the person falls back to sleep quickly, he or she will not remember the event. Many people with sleep apnea are unaware of their abnormal breathing in sleep, and all patients underestimate how often their sleep is interrupted. Awakening from sleep causes sleep to be unrefreshing and causes fatigue and daytime sleepiness. Anatomic causes of obstructive sleep apnea --  Most patients have KEM because of a small upper airway. As the bones of the face and skull develop, some people develop a small lower face, a small mouth, and a tongue that seems too large for the mouth. These features are genetically determined, which explains why KEM tends to cluster in families. Obesity is another major factor. Tonsil enlargement can be an important cause, especially in children. SLEEP APNEA SYMPTOMS -- The main symptoms of KEM are loud snoring, fatigue, and daytime sleepiness. However, some people have no symptoms. For example, if the person does not have a bed partner, he or she may not be aware of the snoring. Fatigue and sleepiness have many causes and are often attributed to overwork and increasing age. As a result, a person may be slow to recognize that they have a problem. A bed partner or spouse often prompts the patient to seek medical care. Other symptoms may include one or more of the following:  ?Restless sleep  ? Awakening with choking, gasping, or smothering  ? Morning headaches, dry mouth, or sore throat  ? Waking frequently to urinate  ? Awakening unrested, groggy  ? Low energy, difficulty concentrating, memory impairment    Risk factors -- Certain factors increase the risk of sleep apnea.   ?Increasing age - KEM occurs at all ages, but it is more common in middle and apnea. Testing is usually performed in a sleep laboratory. A full sleep study is called a polysomnogram. The polysomnogram measures the breathing effort and airflow, blood oxygen level, heart rate and rhythm, duration of the various stages of sleep, body position, and movement of the arms/legs. Home monitoring devices are available that can perform a sleep study. This is a reasonable alternative to conventional testing in a sleep laboratory if the clinician strongly suspects moderate or severe sleep apnea and the patient does not have other illnesses or sleep disorders that may interfere with the results. SLEEP APNEA TREATMENT -- Sleep apnea is best treated by a knowledgeable sleep medicine specialist. The goal of treatment is to maintain an open airway during sleep. Effective treatment will eliminate the symptoms of sleep disturbance; long-term health consequences are also reduced. Most treatments require nightly use. The challenge for the clinician and the patient is to select an effective therapy that is appropriate for the patient's problem and that is acceptable for long term use. Auto-titrating CPAP delivers an amount of PAP that varies during the night. The variation is dependent on event detection software algorithms, which will increase the pressure gradually in response to flow changes until adequate patency is detected. After a period of sustained upper airway patency, the delivered level of pressure gradually decreases until the algorithm identifies recurrent upper airway obstruction, at which point the delivered pressure again increases. The result is that the delivered pressure varies throughout the night, in an effort to provide the lowest pressure that is necessary to maintain upper airway patency. Continuous positive airway pressure (CPAP) -- The most effective treatment for sleep apnea uses air pressure from a mechanical device to keep the upper airway open during sleep.  A CPAP (continuous positive airway pressure)  device uses an air-tight attachment to the nose, typically a mask, connected to a tube and a blower which generates the pressure. Devices that fit comfortably into the nasal opening, rather than over the nose, are also available. CPAP should be used any time the person sleeps (day or night). The CPAP device is usually used for the first time in the sleep lab, where a technician can adjust the pressure and select the best equipment to keep the airway open. Alternatively, an auto device with a self-adjusting pressure feature, provided with proper education and training, can get treatment started without another sleep test. While the treatment may seem uncomfortable, noisy, or bulky at first, most people accept the treatment after experiencing better sleep. However, difficulty with mask comfort and nasal congestion prevent up to 50 percent of people from using the treatment on a regular basis. Continued follow up with a healthcare provider helps to ensure that the treatment is effective and comfortable. Information from the CPAP machine is often used by physicians, therapists, and insurers to track the success of treatment. CPAP can be delivered with different features to improve comfort and solve problems that may come up during treatment. Changes in treatment may be needed if symptoms do not improve or if the persons condition changes, such as a gain or loss of weight. Adjust sleep position -- Adjusting sleep position (to stay off the back) may help improve sleep quality in people who have KEM when sleeping on the back. However, this is difficult to maintain throughout the night and is rarely an adequate solution. Weight loss -- Weight loss may be helpful for obese or overweight patients. Weight loss may be accomplished with dietary changes, exercise, and/or surgical treatment.  However, it can be difficult to maintain weight loss; the five-year success of non-surgical Other procedures, such as maxillomandibular advancement (MMA), address both the upper and lower pharyngeal airway more globally. UPPP alone has limited success rates (less than 50 percent) and people can relapse (when KEM symptoms return after surgery). As a result, this surgery is only recommended in a minority of people and should be considered with caution. MMA may have a higher success rate, particularly in people with abnormal jaw (maxilla and mandible) anatomy, but it is the most complicated procedure. A newer surgical approach, nerve stimulation to protrude the tongue, has promising success rates in very selected people. Tracheostomy creates a permanent opening in the neck. It is reserved for people with severe disease in whom less drastic measures have failed or are inappropriate. Although it is always successful in eliminating obstructive sleep apnea, tracheostomy requires significant lifestyle changes and carries some serious risks (eg, infection, bleeding, blockage). All surgical treatments require discussions about the goals of treatment, the expected outcomes, and potential complications. Hypoglossal nerve stimulator- \"Inspire\" device    PAP treatment failure:  Possible causes of treatment failure include nonadherence or suboptimal adherence, weight gain, an inappropriate level of prescribed positive pressure, or an additional disorder causing sleepiness (eg, narcolepsy) that may require alterations in the therapeutic regimen. A review of medications should also be undertaken since many drugs may lead to sleepiness. Inadequate sleep time may also negate the expected effects from treatment of KEM. Also, pt's can have persistent hypersomnolence associated with sleep apnea even in the presence of adequate therapy and at those times Provigil or Nuvigil or other stimulants may be indicated.     Once the patient's positive airway pressure therapy has been optimized and symptoms resolved, a regimen of long-term follow-up should be established. Annual visits are reasonable, with more frequent visits in between if new issues arise. The purpose of long-term follow-up is to assess usage and monitor for recurrent KEM, new side effects, air leakage, and fluctuations in body weight. WHERE TO GET MORE INFORMATION -- Your healthcare provider is the best source of information for questions and concerns related to your medical problem. Organizations  American Sleep Apnea Association  Provides information about sleep apnea to the public, publishes a newsletter, and serves as an advocate for people with the disorder. [place], [address]   Vane@[employer]. org   [email or web address]. org   Tel: [phone]   Fax: [facility] organization that works to [employer] and safety by promoting public understanding of sleep and sleep disorders. Supports sleep-related education, research, and advocacy; produces and distributes educational materials to the public and healthcare professionals; and offers postdoctoral fellowships and grants for sleep researchers. [address]   Martha@[employer]. org   [employer]. org   Tel: [phone]   Fax: [phone]    Important information:  Medicare/private insurance CPAP/BiPAP/APAP requirements:  Medicare/private insurance has specific requirements for PAP compliance that must be met during the first 90 days of use to continue coverage for CPAP/BiPAP/APAP  from day 91 and beyond. The policy requires that patients use a PAP device 4 hours per 24 hour period, at least 70% of the time over a 30 day period. This data must be downloaded as a report direct from the PAP devices. This is called a compliance download. Your PAP supplier will assist you in this matter.      Reminder:  Please bring a copy of the compliance download to your next office visit or have your supplier fax it to our office prior to your office visit. Note:  Where applicable, we will utilize PAP device efficiency reports, additional testing, and face-to-face  clinical evaluation subsequent to any treatment, changes in treatment, and continued treatment. Caution:  Please abstain from driving or engaging in other activities which may be hazardous in the presence of diminished alertness or daytime drowsiness. And avoid the use of sedatives or alcohol, which can worsen sleep apnea and daytime drowsiness. Mask suggestions:  -     Resmed Airfit N20 (Nasal) or F20 (Full face mask). They conform to your face, thus decreasing the potential for mask leakage. You might like the [employer] (full face mask). It has a \"memory foam\" like cushion. The AirFit F30 is a smaller style full face mask designed to sit low on and cover less of your face for fewer facial marks. AirFit N30i has a top of the head tube with a nasal mask. AirFit P10 reported to be the most comfortable nasal pillow mask. Resmed Mirage FX reported to be the most comfortable nasal mask. Resmed Mirage [place] reported to be the most comfortable hybrid mask. Respironics: You might also like to try a nasal mask called a Dreamwear nasal mask or the Dreamwear nasal pillow. Another suggestion is the [facility], it is a minimal contact full face mask. The Charlet Distad incredible under the nose design makes it the only full face mask that won't cause red marks on the bridge of your nose when compared to other full face masks. The Dreamwear full face mask has a  soft feel, unique in-frame air-flow, and innovative air tube connection at the top of the head for the ultimate in sleep comfort. Comfort Gel Blue. Dreamwear gel pillows. Rick & Ana: Brevida nasal pillow mask and Simplus FFM    The use of a memory foam CPAP pillow supports the head and neck throughout the night.

## 2020-09-01 ENCOUNTER — PATIENT MESSAGE (OUTPATIENT)
Dept: PRIMARY CARE CLINIC | Age: 50
End: 2020-09-01

## 2020-09-01 NOTE — TELEPHONE ENCOUNTER
From: Hamilton Lieberman  To: DOROTHY Conley - CNP  Sent: 9/1/2020 10:29 AM CDT  Subject: Non-Urgent Medical Question    Can I get a chamber for my inhaler I dont think that I'm getting enough of my inhaler.  Thanks sharla

## 2020-09-04 ENCOUNTER — OFFICE VISIT (OUTPATIENT)
Dept: PRIMARY CARE CLINIC | Age: 50
End: 2020-09-04
Payer: MEDICAID

## 2020-09-04 VITALS
WEIGHT: 245.4 LBS | DIASTOLIC BLOOD PRESSURE: 82 MMHG | SYSTOLIC BLOOD PRESSURE: 123 MMHG | RESPIRATION RATE: 16 BRPM | BODY MASS INDEX: 39.44 KG/M2 | HEIGHT: 66 IN | HEART RATE: 88 BPM | OXYGEN SATURATION: 95 % | TEMPERATURE: 97.4 F

## 2020-09-04 LAB
ALBUMIN SERPL-MCNC: 4.5 G/DL (ref 3.5–5.2)
ALP BLD-CCNC: 66 U/L (ref 40–130)
ALT SERPL-CCNC: 40 U/L (ref 5–41)
ANION GAP SERPL CALCULATED.3IONS-SCNC: 14 MMOL/L (ref 7–19)
AST SERPL-CCNC: 23 U/L (ref 5–40)
BILIRUB SERPL-MCNC: 1.3 MG/DL (ref 0.2–1.2)
BUN BLDV-MCNC: 12 MG/DL (ref 6–20)
CALCIUM SERPL-MCNC: 9.3 MG/DL (ref 8.6–10)
CHLORIDE BLD-SCNC: 97 MMOL/L (ref 98–111)
CO2: 27 MMOL/L (ref 22–29)
CREAT SERPL-MCNC: 0.9 MG/DL (ref 0.5–1.2)
GFR AFRICAN AMERICAN: >59
GFR NON-AFRICAN AMERICAN: >60
GLUCOSE BLD-MCNC: 105 MG/DL (ref 74–109)
HCT VFR BLD CALC: 43.1 % (ref 42–52)
HEMOGLOBIN: 14.1 G/DL (ref 14–18)
MCH RBC QN AUTO: 29.8 PG (ref 27–31)
MCHC RBC AUTO-ENTMCNC: 32.7 G/DL (ref 33–37)
MCV RBC AUTO: 91.1 FL (ref 80–94)
PDW BLD-RTO: 12.9 % (ref 11.5–14.5)
PLATELET # BLD: 192 K/UL (ref 130–400)
PMV BLD AUTO: 10.5 FL (ref 9.4–12.4)
POTASSIUM SERPL-SCNC: 4.3 MMOL/L (ref 3.5–5)
PRO-BNP: 29 PG/ML (ref 0–450)
RBC # BLD: 4.73 M/UL (ref 4.7–6.1)
SODIUM BLD-SCNC: 138 MMOL/L (ref 136–145)
TOTAL PROTEIN: 6.8 G/DL (ref 6.6–8.7)
TSH SERPL DL<=0.05 MIU/L-ACNC: 2.3 UIU/ML (ref 0.27–4.2)
WBC # BLD: 6.9 K/UL (ref 4.8–10.8)

## 2020-09-04 PROCEDURE — 1036F TOBACCO NON-USER: CPT | Performed by: NURSE PRACTITIONER

## 2020-09-04 PROCEDURE — G8417 CALC BMI ABV UP PARAM F/U: HCPCS | Performed by: NURSE PRACTITIONER

## 2020-09-04 PROCEDURE — G8427 DOCREV CUR MEDS BY ELIG CLIN: HCPCS | Performed by: NURSE PRACTITIONER

## 2020-09-04 PROCEDURE — 36415 COLL VENOUS BLD VENIPUNCTURE: CPT | Performed by: NURSE PRACTITIONER

## 2020-09-04 PROCEDURE — 99214 OFFICE O/P EST MOD 30 MIN: CPT | Performed by: NURSE PRACTITIONER

## 2020-09-04 RX ORDER — LISINOPRIL AND HYDROCHLOROTHIAZIDE 25; 20 MG/1; MG/1
1 TABLET ORAL DAILY
Qty: 90 TABLET | Refills: 5 | Status: SHIPPED | OUTPATIENT
Start: 2020-09-04 | End: 2020-10-05 | Stop reason: ALTCHOICE

## 2020-09-04 RX ORDER — PRENATAL VIT 91/IRON/FOLIC/DHA 28-975-200
COMBINATION PACKAGE (EA) ORAL
Qty: 1 TUBE | Refills: 1 | Status: SHIPPED | OUTPATIENT
Start: 2020-09-04 | End: 2020-10-05 | Stop reason: ALTCHOICE

## 2020-09-04 ASSESSMENT — ENCOUNTER SYMPTOMS
GASTROINTESTINAL NEGATIVE: 1
SHORTNESS OF BREATH: 1

## 2020-09-04 NOTE — PROGRESS NOTES
[facility] PHYSICIAN SERVICES  [facility]  [address] [place] [address]  [address] [place] [address]  Dept: [phone]  Dept Fax: [phone]  Loc: [phone]    Kai Adams is a 54 y.o. male who presents today for his medical conditions/complaints as noted below. Kai Adams is c/o of Weight Gain (patient presents today for weight gain. )        HPI:     HPI   Chief Complaint   Patient presents with    Weight Gain     patient presents today for weight gain.    end Of July had covid and was coughing a lot. Gaining fluid weight every day. He is using nebulizer still. Had negative CXR on 8-. He says he gets short of breath if he walks very far. He had noticed he was gaining 2 to 3 pounds a day. He is seeing a lung doctor and uses inhalers. He is using his CPAP to sleep at night. He denies any fever. He also would like to discuss his Wellbutrin. We had added Abilify for his depression. We had stopped the Lexapro. He is unsure if he should continue the Wellbutrin. He is feeling better just taking the Abilify and Wellbutrin. He did know he was post to wean off the Wellbutrin.   He was suffering with some ED from the Lexapro so we stopped it  Past Medical History:   Diagnosis Date    COPD (chronic obstructive pulmonary disease) (HCC)     CPAP (continuous positive airway pressure) dependence     10cm to 20cm    Hyperlipidemia     Hypertension     Obstructive sleep apnea     AHI: 84.1 night one and 67.3 night two, per HST, 1/2020    KEM (obstructive sleep apnea)       Past Surgical History:   Procedure Laterality Date    BACK SURGERY  2013    4 rods and 4 screws in back-[facility]    COLONOSCOPY  2008    [facility] 27- pt unsure of results or whom    TONSILLECTOMY AND ADENOIDECTOMY         Vitals 9/4/2020 7/31/2020 7/7/2020 7/2/2020 6/22/2020 8/8/4220   SYSTOLIC 639 - 124 [phone]   DIASTOLIC 82 - 82 76 80 80   Site - - Left Upper Arm - - -   Position - - Sitting - - -   Cuff Size - - Large Adult - - -   Pulse 88 90 83 - 86 90   Temp 97.4 97.8 98.2 - 98.4 98.2   Resp 16 - 18 - 16 18   SpO2 95 98 96 - 97 98   Weight 245 lb 6.4 oz - 231 lb 6.4 oz 232 lb 230 lb 229 lb   Height 5' 6\" - 5' 8\" 5' 6\" 5' 6\" 5' 7.5\"   BMI (wt*703/ht~2) 39.6 kg/m2 - 35.18 kg/m2 37.44 kg/m2 37.12 kg/m2 35.33 kg/m2       Family History   Problem Relation Age of Onset    Diabetes Mother     High Blood Pressure Mother     Sleep Apnea Mother     Prostate Cancer Maternal Grandfather     Colon Cancer Neg Hx     Colon Polyps Neg Hx        Social History     Tobacco Use    Smoking status: Former Smoker     Packs/day: 0.25     Years: 15.00     Pack years: 3.75     Types: Cigarettes    Smokeless tobacco: Never Used   Substance Use Topics    Alcohol use: Not Currently      Current Outpatient Medications   Medication Sig Dispense Refill    terbinafine (LAMISIL AT) 1 % cream Apply topically 2 times daily. area on hip for 14 days 1 Tube 1    lisinopril-hydroCHLOROthiazide (PRINZIDE;ZESTORETIC) 20-25 MG per tablet Take 1 tablet by mouth daily 90 tablet 5    Spacer/Aero-Holding Chambers KAYAL 1 Device by Does not apply route daily as needed (wheezing) To use with inhaler.  1 Device 0    cyclobenzaprine (FLEXERIL) 10 MG tablet Take 1 tablet by mouth 3 times daily as needed for Muscle spasms May make drowsy 30 tablet 1    sucralfate (CARAFATE) 1 GM tablet Take 1 tablet by mouth 4 times daily for 10 days 120 tablet 1    ARIPiprazole (ABILIFY) 5 MG tablet Take 1 tablet by mouth daily Stop the lexapro 30 tablet 3    rOPINIRole (REQUIP) 0.5 MG tablet Take 1 q hs 30 tablet 3    albuterol (ACCUNEB) 1.25 MG/3ML nebulizer solution Inhale 3 mLs into the lungs every 6 hours as needed for Wheezing covidi 19 positive, fyi 120 mL 3    albuterol sulfate  (90 Base) MCG/ACT inhaler INHALE 2 PUFFS INTO THE LUNGS FOUR TIMES DAILY AS NEEDED FOR WHEEZING 8.5 g 1    buPROPion (WELLBUTRIN XL) 150 MG extended release tablet Go down to 2 pills daily for 7 days (300mg) then down to 150mg once a day and add lexapro 30 tablet 3    omeprazole (PRILOSEC) 20 MG delayed release capsule Take 1 capsule by mouth 2 times daily (before meals) 60 capsule 1    INCRUSE ELLIPTA 62.5 MCG/INH AEPB INHALE 1 PUFF INTO THE LUNGS DAILY 30 each 3    atorvastatin (LIPITOR) 40 MG tablet Take 1 tablet by mouth daily 90 tablet 3    blood glucose test strips (ONE TOUCH ULTRA TEST) strip USE TO TEST TWICE DAILY 100 strip 3    tiotropium (SPIRIVA HANDIHALER) 18 MCG inhalation capsule Inhale 1 capsule into the lungs daily 30 capsule 5    Lancets MISC 1 each by Does not apply route 2 times daily 100 each 5    glucose monitoring kit (FREESTYLE) monitoring kit 1 kit by Does not apply route daily 1 kit 0     No current facility-administered medications for this visit. No Known Allergies    Health Maintenance   Topic Date Due    Pneumococcal 0-64 years Vaccine (1 of 1 - PPSV23) 11/10/1978    HIV screen  11/10/1987    DTaP/Tdap/Td vaccine (1 - Tdap) 11/10/1991    Flu vaccine (1) 09/01/2020    A1C test (Diabetic or Prediabetic)  07/07/2021    Lipid screen  07/07/2021    Potassium monitoring  07/07/2021    Creatinine monitoring  07/07/2021    Hepatitis A vaccine  Aged Out    Hepatitis B vaccine  Aged Out    Hib vaccine  Aged Out    Meningococcal (ACWY) vaccine  Aged Out       Subjective:      Review of Systems   Constitutional: Positive for unexpected weight change. Respiratory: Positive for shortness of breath. Cardiovascular: Positive for leg swelling. Negative for chest pain and palpitations. Gastrointestinal: Negative. Genitourinary:        ED   Skin: Positive for rash. Psychiatric/Behavioral: Positive for dysphoric mood. The patient is nervous/anxious. Objective:     Physical Exam  Vitals signs and nursing note reviewed. Constitutional:       Appearance: He is well-developed. HENT:      Head: Normocephalic.    Neck:      Vascular: No carotid bruit. Cardiovascular:      Rate and Rhythm: Normal rate and regular rhythm. Heart sounds: Normal heart sounds. Pulmonary:      Effort: Pulmonary effort is normal.      Breath sounds: Normal breath sounds. Musculoskeletal:      Right lower leg: Edema present. Left lower leg: Edema present. Skin:     General: Skin is warm and dry. Neurological:      Mental Status: He is alert and oriented to person, place, and time. Psychiatric:         Behavior: Behavior normal.         Thought Content: Thought content normal.         Judgment: Judgment normal.     Mild 1+ pitting edema in both ankles midway up to the knee. /82   Pulse 88   Temp 97.4 °F (36.3 °C) (Temporal)   Resp 16   Ht 5' 6\" (1.676 m)   Wt 245 lb 6.4 oz (111.3 kg)   SpO2 95%   BMI 39.61 kg/m²     Assessment:       Diagnosis Orders   1. Short of breath on exertion  lisinopril-hydroCHLOROthiazide (PRINZIDE;ZESTORETIC) 20-25 MG per tablet    CBC    Comprehensive Metabolic Panel    TSH without Reflex    Brain Natriuretic Peptide    ECHO 2D WO Color Doppler Complete   2. Weight gain  lisinopril-hydroCHLOROthiazide (PRINZIDE;ZESTORETIC) 20-25 MG per tablet    CBC    Comprehensive Metabolic Panel    TSH without Reflex    Brain Natriuretic Peptide    ECHO 2D WO Color Doppler Complete   3. Fatigue, unspecified type  lisinopril-hydroCHLOROthiazide (PRINZIDE;ZESTORETIC) 20-25 MG per tablet    CBC    Comprehensive Metabolic Panel    TSH without Reflex    Brain Natriuretic Peptide    ECHO 2D WO Color Doppler Complete   4. History of 2019 novel coronavirus disease (COVID-19)  ECHO 2D WO Color Doppler Complete   5. Depression with anxiety     6. Tinea corporis           Plan:   More than 50% of the time was spent counseling and coordinating care for a total time of 25min face to face. In reviewing his medicines I have not filled his lisinopril HCTZ since January for 6 months this means he would have run out.   I did call the pharmacy and they have not filled it for him in the last couple months. So first thing we are going to do is get him restarted on this. He is going to use his CPAP. We are going to get an echo to check his heart. He will follow-up with his pulmonologist.  He will go to the emergency room if he has severe shortness of breath. He is going to continue monitoring his weight daily. Patient given educational materials -see patient instructions. Discussed use, benefit, and side effects of prescribed medications. All patient questions answered. Pt voiced understanding. Reviewed health maintenance. Instructed to continue currentmedications, diet and exercise. Patient agreed with treatment plan. Follow up as directed. MEDICATIONS:  Orders Placed This Encounter   Medications    terbinafine (LAMISIL AT) 1 % cream     Sig: Apply topically 2 times daily. area on hip for 14 days     Dispense:  1 Tube     Refill:  1    lisinopril-hydroCHLOROthiazide (PRINZIDE;ZESTORETIC) 20-25 MG per tablet     Sig: Take 1 tablet by mouth daily     Dispense:  90 tablet     Refill:  5         ORDERS:  Orders Placed This Encounter   Procedures    CBC    Comprehensive Metabolic Panel    TSH without Reflex    Brain Natriuretic Peptide    ECHO 2D WO Color Doppler Complete       Follow-up:  No follow-ups on file. PATIENT INSTRUCTIONS:  Patient Instructions   Sure you are taking your lisinopril with HCTZ every morning for fluid  Make sure you are taking your Wellbutrin and Abilify for mood  Follow-up with your lung doctor  Have the echo to make sure your heart is functioning properly call us back with a day that works best for you  We will wait get the labs back today. Electronically signed by DOROTHY Agee CNP on 9/4/2020 at 9:52 AM    EMR Dragon/transcription disclaimer:  Much of thisencounter note is electronic transcription/translation of spoken language to printed texts.   The electronic translation of spoken language may be erroneous, or at times, nonsensical words or phrases may be inadvertentlytranscribed.   Although I have reviewed the note for such errors, some may still exist.

## 2020-09-04 NOTE — PATIENT INSTRUCTIONS
Sure you are taking your lisinopril with HCTZ every morning for fluid  Make sure you are taking your Wellbutrin and Abilify for mood  Follow-up with your lung doctor  Have the echo to make sure your heart is functioning properly call us back with a day that works best for you  We will wait get the labs back today.

## 2020-09-09 RX ORDER — OMEPRAZOLE 20 MG/1
20 CAPSULE, DELAYED RELEASE ORAL
Qty: 60 CAPSULE | Refills: 1 | Status: SHIPPED | OUTPATIENT
Start: 2020-09-09 | End: 2020-11-02 | Stop reason: SDUPTHER

## 2020-09-20 ENCOUNTER — OFFICE VISIT (OUTPATIENT)
Age: 50
End: 2020-09-20

## 2020-09-20 VITALS — OXYGEN SATURATION: 98 % | HEART RATE: 89 BPM | TEMPERATURE: 97.2 F

## 2020-09-20 PROCEDURE — 99999 PR OFFICE/OUTPT VISIT,PROCEDURE ONLY: CPT | Performed by: PHYSICIAN ASSISTANT

## 2020-09-23 LAB — SARS-COV-2, NAA: NOT DETECTED

## 2020-09-24 ENCOUNTER — ANESTHESIA EVENT (OUTPATIENT)
Dept: ENDOSCOPY | Age: 50
End: 2020-09-24
Payer: MEDICAID

## 2020-09-24 ENCOUNTER — HOSPITAL ENCOUNTER (OUTPATIENT)
Age: 50
Setting detail: OUTPATIENT SURGERY
Discharge: HOME | End: 2020-09-24
Attending: INTERNAL MEDICINE | Admitting: INTERNAL MEDICINE
Payer: MEDICAID

## 2020-09-24 ENCOUNTER — ANESTHESIA (OUTPATIENT)
Dept: ENDOSCOPY | Age: 50
End: 2020-09-24
Payer: MEDICAID

## 2020-09-24 VITALS
TEMPERATURE: 98 F | BODY MASS INDEX: 40.66 KG/M2 | SYSTOLIC BLOOD PRESSURE: 126 MMHG | HEIGHT: 66 IN | RESPIRATION RATE: 18 BRPM | HEART RATE: 72 BPM | WEIGHT: 253 LBS | OXYGEN SATURATION: 93 % | DIASTOLIC BLOOD PRESSURE: 83 MMHG

## 2020-09-24 VITALS — DIASTOLIC BLOOD PRESSURE: 56 MMHG | SYSTOLIC BLOOD PRESSURE: 110 MMHG | OXYGEN SATURATION: 96 %

## 2020-09-24 LAB
GLUCOSE BLD-MCNC: 95 MG/DL (ref 70–99)
PERFORMED ON: NORMAL

## 2020-09-24 PROCEDURE — 2500000003 HC RX 250 WO HCPCS: Performed by: NURSE ANESTHETIST, CERTIFIED REGISTERED

## 2020-09-24 PROCEDURE — 2580000003 HC RX 258: Performed by: INTERNAL MEDICINE

## 2020-09-24 PROCEDURE — 82947 ASSAY GLUCOSE BLOOD QUANT: CPT

## 2020-09-24 PROCEDURE — 3700000001 HC ADD 15 MINUTES (ANESTHESIA): Performed by: INTERNAL MEDICINE

## 2020-09-24 PROCEDURE — 3700000000 HC ANESTHESIA ATTENDED CARE: Performed by: INTERNAL MEDICINE

## 2020-09-24 PROCEDURE — 7100000010 HC PHASE II RECOVERY - FIRST 15 MIN: Performed by: INTERNAL MEDICINE

## 2020-09-24 PROCEDURE — 6360000002 HC RX W HCPCS: Performed by: NURSE ANESTHETIST, CERTIFIED REGISTERED

## 2020-09-24 PROCEDURE — 88305 TISSUE EXAM BY PATHOLOGIST: CPT

## 2020-09-24 PROCEDURE — 3609017100 HC EGD: Performed by: INTERNAL MEDICINE

## 2020-09-24 PROCEDURE — 43239 EGD BIOPSY SINGLE/MULTIPLE: CPT | Performed by: INTERNAL MEDICINE

## 2020-09-24 PROCEDURE — 7100000011 HC PHASE II RECOVERY - ADDTL 15 MIN: Performed by: INTERNAL MEDICINE

## 2020-09-24 PROCEDURE — 43450 DILATE ESOPHAGUS 1/MULT PASS: CPT | Performed by: INTERNAL MEDICINE

## 2020-09-24 PROCEDURE — 2709999900 HC NON-CHARGEABLE SUPPLY: Performed by: INTERNAL MEDICINE

## 2020-09-24 PROCEDURE — 45378 DIAGNOSTIC COLONOSCOPY: CPT | Performed by: INTERNAL MEDICINE

## 2020-09-24 PROCEDURE — 3609027000 HC COLONOSCOPY: Performed by: INTERNAL MEDICINE

## 2020-09-24 RX ORDER — FENTANYL CITRATE 50 UG/ML
INJECTION, SOLUTION INTRAMUSCULAR; INTRAVENOUS PRN
Status: DISCONTINUED | OUTPATIENT
Start: 2020-09-24 | End: 2020-09-24 | Stop reason: SDUPTHER

## 2020-09-24 RX ORDER — PROMETHAZINE HYDROCHLORIDE 25 MG/ML
6.25 INJECTION, SOLUTION INTRAMUSCULAR; INTRAVENOUS
Status: DISCONTINUED | OUTPATIENT
Start: 2020-09-24 | End: 2020-09-24 | Stop reason: HOSPADM

## 2020-09-24 RX ORDER — ONDANSETRON 2 MG/ML
4 INJECTION INTRAMUSCULAR; INTRAVENOUS
Status: DISCONTINUED | OUTPATIENT
Start: 2020-09-24 | End: 2020-09-24 | Stop reason: HOSPADM

## 2020-09-24 RX ORDER — DIPHENHYDRAMINE HYDROCHLORIDE 50 MG/ML
12.5 INJECTION INTRAMUSCULAR; INTRAVENOUS
Status: DISCONTINUED | OUTPATIENT
Start: 2020-09-24 | End: 2020-09-24 | Stop reason: HOSPADM

## 2020-09-24 RX ORDER — PROPOFOL 10 MG/ML
INJECTION, EMULSION INTRAVENOUS PRN
Status: DISCONTINUED | OUTPATIENT
Start: 2020-09-24 | End: 2020-09-24 | Stop reason: SDUPTHER

## 2020-09-24 RX ORDER — SODIUM CHLORIDE, SODIUM LACTATE, POTASSIUM CHLORIDE, CALCIUM CHLORIDE 600; 310; 30; 20 MG/100ML; MG/100ML; MG/100ML; MG/100ML
INJECTION, SOLUTION INTRAVENOUS CONTINUOUS
Status: DISCONTINUED | OUTPATIENT
Start: 2020-09-24 | End: 2020-09-24 | Stop reason: HOSPADM

## 2020-09-24 RX ORDER — PROPOFOL 10 MG/ML
INJECTION, EMULSION INTRAVENOUS CONTINUOUS PRN
Status: DISCONTINUED | OUTPATIENT
Start: 2020-09-24 | End: 2020-09-24 | Stop reason: SDUPTHER

## 2020-09-24 RX ORDER — LIDOCAINE HYDROCHLORIDE 20 MG/ML
INJECTION, SOLUTION INFILTRATION; PERINEURAL PRN
Status: DISCONTINUED | OUTPATIENT
Start: 2020-09-24 | End: 2020-09-24 | Stop reason: SDUPTHER

## 2020-09-24 RX ADMIN — PROPOFOL 160 MCG/KG/MIN: 10 INJECTION, EMULSION INTRAVENOUS at 09:54

## 2020-09-24 RX ADMIN — SODIUM CHLORIDE, POTASSIUM CHLORIDE, SODIUM LACTATE AND CALCIUM CHLORIDE: 600; 310; 30; 20 INJECTION, SOLUTION INTRAVENOUS at 08:20

## 2020-09-24 RX ADMIN — LIDOCAINE HYDROCHLORIDE 50 MG: 20 INJECTION, SOLUTION INFILTRATION; PERINEURAL at 09:54

## 2020-09-24 RX ADMIN — PROPOFOL 160 MG: 10 INJECTION, EMULSION INTRAVENOUS at 09:54

## 2020-09-24 RX ADMIN — FENTANYL CITRATE 50 MCG: 50 INJECTION INTRAMUSCULAR; INTRAVENOUS at 09:54

## 2020-09-24 ASSESSMENT — PAIN - FUNCTIONAL ASSESSMENT: PAIN_FUNCTIONAL_ASSESSMENT: 0-10

## 2020-09-24 ASSESSMENT — PAIN SCALES - GENERAL
PAINLEVEL_OUTOF10: 0
PAINLEVEL_OUTOF10: 0

## 2020-09-24 NOTE — OP NOTE
Endoscopic Procedure Note    Patient: Luz Marina Perez : 1970  Med Rec#: [id number] Acc#: [id number]     Primary Care Provider DOROTHY Anderson CNP    Endoscopist: Alcides May MD    Date of Procedure:  2020    Procedure:   1. EGD with a Miller bougie dilation and cold biopsies    Indications:   for both EGD and colonoscopy exams done today:  1. Change in bowel habits     2. Diarrhea, unspecified type    3. Dysphagia, unspecified type     4. Chronic heartburn    Anesthesia:  Sedation was administered by anesthesia who monitored the patient during the procedure. Estimated Blood Loss: minimal    Procedure:   After reviewing the patient's chart and obtaining informed consent, the patient was placed in the left lateral decubitus position. A forward-viewing Olympus endoscope was lubricated and inserted through the mouth into the oropharynx. Under direct visualization, the upper esophagus was intubated. The scope was advanced to the level of the third portion of duodenum. Scope was slowly withdrawn with careful inspection of the mucosal surfaces. The scope was retroflexed for inspection of the gastric fundus and incisura. Findings and maneuvers are listed in impression below. The patient tolerated the procedure well. Next, a lubricated Miller weighted Bougie dilator-54 Fr was gently introduced into the patient's mouth and passed into the Esophagus and into the proximal stomach without much resistance and then withdrawn. Repeat EGD was performed to verify dilation and scope tip was passed into the stomach. NO evidence of perforation or excessive bleeding was noted subsequent to the dilation. The scope was removed. There were no immediate complications. Findings/IMPRESSION:  Esophagus: normal and a normal EG junction at 40 cm. NO erosions or ulcers or nodules or strictures or webs or rings or mass lesions or extrinsic compression or diverticula noted.  An empirical Miller 54 fr bougie dilation was performed and random cold biopsies were taken to check for EoE and NERD. There is NO hiatal hernia present. Stomach:  Normal without any mucosal changes suggestive of gastritis noted - random Gastric biopsies were taken from the antrum and body to rule out Helicobacter pylori infection. NO ulcers or masses or gastric outlet obstruction or retained food or fluid. Rugae were normal and lumen distended well with insufflation. Retroflexed views otherwise revealed a normal GE junction, fundus and cardia as well. Duodenum: normal; random biopsies were taken to check for Celiac disease. RECOMMENDATIONS:    1. Await path results, the patient will be contacted in 7-10 days with biopsy results. 2.  Magic mouthwash 5 ml PO Swish and swallow q3h PRN ONLY IF patient has post-procedural sorethroat or chest pain. 3. Full liquids to soft diet today analy discharge from the surgicenter; may advance  diet starting in AM tomorrow. 4. May resume other meds except any ASA/NSAIDs; may use cough drops or lozenges PRN; also OTC/prescription PPI or H2RA PO qday or BID with anti-GERD measures. 5. NO ASA/NSAIDs x 2 weeks  6. OP f/u in 4-6 weeks; will consider an Esophageal manometry later if the patient's dysphagia persists. The results were discussed with the patient and family. A copy of the images obtained were given to the patient.      Jonnathan Olivares MD  9/24/2020  9:53 AM

## 2020-09-24 NOTE — H&P
Patient Name: Gabriel Silvestre  : 1970  MRN: [id number]  DATE: 20    Allergies: No Known Allergies     ENDOSCOPY  History and Physical    Procedure:    [x] Diagnostic Colonoscopy       [] Screening Colonoscopy  [x] EGD      [] ERCP      [] EUS       [] Other    [x] Previous office notes/History and Physical reviewed from the patients chart. Please see EMR for further details of HPI. I have examined the patient's status immediately prior to the procedure and:      Indications/HPI:  For both EGD and colonoscopy exams today:  1. Change in bowel habits     2. Diarrhea, unspecified type     3. Dysphagia, unspecified type     4. Chronic heartburn    []Abdominal Pain   []Cancer- GI/Lung     []Fhx of colon CA/polyps  []History of Polyps  []Barretts            []Melena  []Abnormal Imaging              []Dysphagia              []Persistent Pneumonia   []Anemia                            []Food Impaction        []History of Polyps  [] GI Bleed             []Pulmonary nodule/Mass   []Change in bowel habits []Heartburn/Reflux  []Rectal Bleed (BRBPR)  []Chest Pain - Non Cardiac []Heme (+) Stool []Ulcers  []Constipation  []Hemoptysis  []Varices  []Diarrhea  []Hypoxemia    []Nausea/Vomiting   []Screening   []Crohns/Colitis  []Other:     Anesthesia:   [x] MAC [] Moderate Sedation   [] General   [] None     ROS: 12 pt Review of Symptoms was negative unless mentioned above    Medications:   Prior to Admission medications    Medication Sig Start Date End Date Taking? Authorizing Provider   terbinafine (LAMISIL AT) 1 % cream Apply topically 2 times daily. area on hip for 14 days 20  Yes Maggy Simin, APRN - CNP   blood glucose test strips (ONE TOUCH ULTRA TEST) strip USE TO TEST TWICE DAILY 2/10/20  Yes Maggy Simin, APRN - CNP   Lancets MISC 1 each by Does not apply route 2 times daily 19  Yes Maggy Simin, APRN - CNP   glucose monitoring kit (FREESTYLE) monitoring kit 1 kit by Does not apply route daily 12/20/19  Yes DOROTHY Henriquez CNP   omeprazole (PRILOSEC) 20 MG delayed release capsule Take 1 capsule by mouth 2 times daily (before meals) 9/9/20   DOROTHY Lam CNP   lisinopril-hydroCHLOROthiazide (PRINZIDE;ZESTORETIC) 20-25 MG per tablet Take 1 tablet by mouth daily 9/4/20   DOROTHY Lam CNP   Spacer/Aero-Holding Pattricia Idol 1 Device by Does not apply route daily as needed (wheezing) To use with inhaler.  9/1/20   FaDOROTHY Castillo CNP   rOPINIRole (REQUIP) 0.5 MG tablet Take 1 q hs 8/20/20   VIVIANA Connors   albuterol (ACCUNEB) 1.25 MG/3ML nebulizer solution Inhale 3 mLs into the lungs every 6 hours as needed for Wheezing covidi 19 positive, fyi 8/11/20   Lubayrene DOROTHY Espinoza CNP   albuterol sulfate  (90 Base) MCG/ACT inhaler INHALE 2 PUFFS INTO THE LUNGS FOUR TIMES DAILY AS NEEDED FOR WHEEZING 7/10/20   Fayrene DOROTHY Espinoza CNP   buPROPion (WELLBUTRIN XL) 150 MG extended release tablet Go down to 2 pills daily for 7 days (300mg) then down to 150mg once a day and add lexapro 7/7/20   Fayrene Gravely, APRN - CNP   INCRUSE ELLIPTA 62.5 MCG/INH AEPB INHALE 1 PUFF INTO THE LUNGS DAILY 5/14/20   DOROTHY Lam CNP   atorvastatin (LIPITOR) 40 MG tablet Take 1 tablet by mouth daily 2/17/20   Fayrene DOROTHY Espinoza CNP   tiotropium ([place]) 18 MCG inhalation capsule Inhale 1 capsule into the lungs daily 1/7/20   DOROTHY Lam CNP       Past Medical History:  Past Medical History:   Diagnosis Date    COPD [facility]chronic obstructive pulmonary disease) ([facility] [address]     COVID-19     CPAP (continuous positive airway pressure) dependence     10cm to 20cm    Hyperlipidemia     Hypertension     Obstructive sleep apnea     AHI: 84.1 night one and 67.3 night two, per HST, 1/2020    KEM (obstructive sleep apnea)        Past Surgical History:  Past Surgical History:   Procedure Laterality Date    BACK SURGERY  2013    4 rods and 4 screws in back-[facility]    COLONOSCOPY      [facility] 27- pt unsure of results or whom    TONSILLECTOMY AND ADENOIDECTOMY         Social History:  Social History     Tobacco Use    Smoking status: Former Smoker     Packs/day: 0.25     Years: 15.00     Pack years: 3.75     Types: Cigarettes     Last attempt to quit: 2019     Years since quittin.7    Smokeless tobacco: Never Used   Substance Use Topics    Alcohol use: Not Currently    Drug use: Not Currently       Vital Signs:   Vitals:    20 0747   BP: (!) 126/90   Pulse: 80   Resp: 20   Temp: 97.8 °F (36.6 °C)   SpO2: 96%        Physical Exam:  Cardiac:  [x]WNL  []Comments:  Pulmonary:  [x]WNL   []Comments:  Neuro/Mental Status:  [x]WNL  []Comments:  Abdominal:  [x]WNL    []Comments:  Other:   []WNL  []Comments:    Informed Consent:  The risks and benefits of the procedure have been discussed with either the patient or if they cannot consent, their representative. Assessment:  Patient examined and appropriate for planned sedation and procedure. Plan:  Proceed with planned sedation and procedure as above.          Carlene Hare MD

## 2020-09-24 NOTE — ANESTHESIA POSTPROCEDURE EVALUATION
Department of Anesthesiology  Postprocedure Note    Patient: Uvaldo Sinclair  MRN: [id number]  YOB: 1970  Date of evaluation: 9/24/2020  Time:  10:30 AM     Procedure Summary     Date:  09/24/20 Room / Location:  [address]    Anesthesia Start:  5290 Anesthesia Stop:  1512    Procedures:       EGD ESOPHAGOGASTRODUODENOSCOPY (N/A )      COLONOSCOPY DIAGNOSTIC (N/A )      DILATION, ESOPHAGUS Diagnosis:  (DYSPHAGIA, DIARRHEA)    Surgeon:  Rhona Luevano MD Responsible Provider:  DOROTHY Foreman CRNA    Anesthesia Type:  general ASA Status:  3          Anesthesia Type: general    Nichole Phase I: Nichole Score: 10    Nichole Phase II:      Last vitals: Reviewed and per EMR flowsheets.        Anesthesia Post Evaluation    Patient location during evaluation: bedside  Patient participation: complete - patient participated  Level of consciousness: sleepy but conscious  Pain score: 0  Airway patency: patent  Nausea & Vomiting: no nausea and no vomiting  Complications: no  Cardiovascular status: hemodynamically stable  Respiratory status: acceptable  Hydration status: euvolemic

## 2020-09-24 NOTE — OP NOTE
Patient: Sheila Henriquez : 1970  Med Rec#: [id number] Acc#: [id number]   Primary Care Provider DOROTHY Briscoe CNP    Date of Procedure:  2020    Endoscopist: Bernard Shah MD    Referring Provider: DOROTHY Briscoe CNP,     Operation Performed: Colonoscopy up to the Cecum    Indications: for both EGD and colonoscopy exams done today:  1. Change in bowel habits     2. Diarrhea, unspecified type    3. Dysphagia, unspecified type     4. Chronic heartburn    Anesthesia:  Sedation was administered by anesthesia who monitored the patient during the procedure. I met with Sheila Henriquez prior to procedure. We discussed the procedure itself, and I have discussed the risks of endoscopy (including-- but not limited to-- pain, discomfort, bleeding potentially requiring second endoscopic procedure and/or blood transfusion, organ perforation requiring operative repair, damage to organs near the colon, infection, aspiration, cardiopulmonary/allergic reaction), benefits, indications to endoscopy. Additionally, we discussed options other than colonoscopy. The patient expressed understanding. All questions answered. The patient decided to proceed with the procedure. Signed informed consent was placed on the chart. Blood Loss: minimal    Withdrawal time: >6 mins  Bowel Prep: Very poor with large amounts of thick solid and semisolid stool scattered in segments throughout the colon obscuring the underlying mucosa. Small lesions including polyps may have been missed. Exam was inadequate for any CRCS. Complications: no immediate complications    DESCRIPTION OF PROCEDURE:     A time out was performed. After written informed consent was obtained, the patient was placed in the left lateral position. The perianal area was inspected, and a digital rectal exam was performed. A rectal exam was performed: normal tone, no palpable lesions.  At this point, a forward viewing Olympus colonoscope was inserted into the anus and carefully advanced to the Cecum. The cecum was identified by the ileocecal valve and the appendiceal orifice. The colonoscope was then slowly withdrawn with careful inspection of the mucosa in a linear and circumferential fashion. The scope was retroflexed in the rectum. Suction was utilized during the procedure to remove as much air as possible from the bowel. The colonoscope was removed from the patient, and the procedure was terminated. Findings are listed below. Findings:     NO large masses or strictures or overt colitis. Suboptimal exam inadequate for CRCS due to prep quality as described above. Internal hemorrhoids-Grade 1  Where it was clearly visible, the mucosa appeared normal throughout the entire examined colon  Retroflexion in the rectum was otherwise normal and revealed no further abnormalities   Recommendations:  1. Repeat colonoscopy: if his symptoms do not improve, in 3-4 weeks with a two day strict clear liquid diet and a two day prep, for biopsies and to rule out polyps and cancer; otherwise, in 3 years for CRCS with a two day clear liquid diet and a two day prep at that time    - Resume previous meds and diet  - GI clinic f/u 4-6 weeks  - Keep scheduled f/u appts with other MDs     - NO ASA/NSAIDs x 2 weeks    Findings and recommendations were discussed w/ the patient. A copy of the images was provided.     Leyla Hernandez MD  9/24/2020  9:52 AM

## 2020-09-24 NOTE — ANESTHESIA PRE PROCEDURE
Department of Anesthesiology  Preprocedure Note       Name:  Wolf Cohen   Age:  54 y.o.  :  1970                                          MRN:  [id number]         Date:  2020      Surgeon: Martín Watt):  Tootie Reddy MD    Procedure: Procedure(s):  EGD ESOPHAGOGASTRODUODENOSCOPY  COLONOSCOPY DIAGNOSTIC    Medications prior to admission:   Prior to Admission medications    Medication Sig Start Date End Date Taking? Authorizing Provider   terbinafine (LAMISIL AT) 1 % cream Apply topically 2 times daily. area on hip for 14 days 20  Yes DOROTHY Manning CNP   blood glucose test strips (ONE TOUCH ULTRA TEST) strip USE TO TEST TWICE DAILY 2/10/20  Yes DOROTHY Manning CNP   Lancets MISC 1 each by Does not apply route 2 times daily 19  Yes DOROTHY Manning CNP   glucose monitoring kit (FREESTYLE) monitoring kit 1 kit by Does not apply route daily 19  Yes DOROTHY Manning CNP   omeprazole (PRILOSEC) 20 MG delayed release capsule Take 1 capsule by mouth 2 times daily (before meals) 20   DOROTHY Manning CNP   lisinopril-hydroCHLOROthiazide (PRINZIDE;ZESTORETIC) 20-25 MG per tablet Take 1 tablet by mouth daily 20   DOROTHY Manning CNP   Spacer/Aero-Holding Mickeal Melter 1 Device by Does not apply route daily as needed (wheezing) To use with inhaler.  20   DOROTHY Manning CNP   rOPINIRole (REQUIP) 0.5 MG tablet Take 1 q hs 20   VIVIANA Mckeon   albuterol (ACCUNEB) 1.25 MG/3ML nebulizer solution Inhale 3 mLs into the lungs every 6 hours as needed for Wheezing covidi 19 positive, fyi 20   DOROTHY Manning CNP   albuterol sulfate  (90 Base) MCG/ACT inhaler INHALE 2 PUFFS INTO THE LUNGS FOUR TIMES DAILY AS NEEDED FOR WHEEZING 7/10/20   DOROTHY Montgomery CNP   buPROPion (WELLBUTRIN XL) 150 MG extended release tablet Go down to 2 pills daily for 7 days (300mg) then down to 150mg once a day and add lexapro 7/7/20   DOROTHY Spence CNP   INCRUSE ELLIPTA 62.5 MCG/INH AEPB INHALE 1 PUFF INTO THE LUNGS DAILY 5/14/20   DOROTHY Spence CNP   atorvastatin (LIPITOR) 40 MG tablet Take 1 tablet by mouth daily 2/17/20   DOROTHY Spence CNP   tiotropium (Lenice Lolling) 18 MCG inhalation capsule Inhale 1 capsule into the lungs daily 1/7/20   DOROTHY Spence CNP       Current medications:    Current Facility-Administered Medications   Medication Dose Route Frequency Provider Last Rate Last Dose    lactated ringers infusion   Intravenous Continuous Radha Holloway MD           Allergies:  No Known Allergies    Problem List:    Patient Active Problem List   Diagnosis Code    Depression with anxiety F41.8    Smoker F17.200    Hyperlipidemia E78.5    Chronic obstructive pulmonary disease ([facility] [address] J44.9    Obstructive sleep apnea G47.33    Restless leg syndrome G25.81    Hypoxemia R09.02    CPAP (continuous positive airway pressure) dependence Z99.89    Pharyngoesophageal dysphagia R13.14    Change in bowel habits R19.4    Diarrhea R19.7    Dysphagia R13.10    Chronic heartburn R12       Past Medical History:        Diagnosis Date    [facility]     CPAP (continuous positive airway pressure) dependence     10cm to 20cm    Hyperlipidemia     Hypertension     Obstructive sleep apnea     AHI: 84.1 night one and 67.3 night two, per HST, 1/2020    KEM (obstructive sleep apnea)        Past Surgical History:        Procedure Laterality Date    BACK SURGERY  2013    4 rods and 4 screws in back-[facility]    [facility]COPY  2008    [facility] 27- pt unsure of results or whom    TONSILLECTOMY AND ADENOIDECTOMY         Social History:    Social History     Tobacco Use    Smoking status: Former Smoker     Packs/day: 0.25     Years: 15.00     Pack years: 3.75     Types: Cigarettes    Smokeless tobacco: Never Used   Substance Use HIV, HEPCAB    COVID-19 Screening (If Applicable):   Lab Results   Component Value Date    COVID19 NOT DETECTED 09/20/2020    COVID19 DETECTED 07/30/2020         Anesthesia Evaluation  Patient summary reviewed and Nursing notes reviewed no history of anesthetic complications:   Airway: Mallampati: IV  TM distance: >3 FB   Neck ROM: full  Mouth opening: > = 3 FB Dental: normal exam         Pulmonary:normal exam    (+) COPD:  sleep apnea: on CPAP,                            ROS comment: SOB since covid. Has seen pulmonologist and everything was fine per patient. Cardiovascular:  Exercise tolerance: good (>4 METS),   (+) hypertension:,                   Neuro/Psych:   (+) psychiatric history:            GI/Hepatic/Renal:   (+) GERD: well controlled, morbid obesity          Endo/Other: Negative Endo/Other ROS                    Abdominal:   (+) obese,         Vascular:                                        Anesthesia Plan      general     ASA 3       Induction: intravenous. Anesthetic plan and risks discussed with patient.                       DOROTHY Dixon - CRNA   9/24/2020

## 2020-09-24 NOTE — PROGRESS NOTES
Tolerating po fluids withpot difficulty swallowing. Belching some. NO abd pain or difficulty breathing. No palpable SC emphysema.

## 2020-10-06 ENCOUNTER — TELEMEDICINE (OUTPATIENT)
Dept: GASTROENTEROLOGY | Age: 50
End: 2020-10-06
Payer: MEDICAID

## 2020-10-06 PROBLEM — R11.0 NAUSEA: Status: ACTIVE | Noted: 2020-10-06

## 2020-10-06 PROBLEM — R10.10 UPPER ABDOMINAL PAIN: Status: ACTIVE | Noted: 2020-10-06

## 2020-10-06 PROCEDURE — 99214 OFFICE O/P EST MOD 30 MIN: CPT | Performed by: NURSE PRACTITIONER

## 2020-10-06 PROCEDURE — G8428 CUR MEDS NOT DOCUMENT: HCPCS | Performed by: NURSE PRACTITIONER

## 2020-10-06 ASSESSMENT — ENCOUNTER SYMPTOMS
DIARRHEA: 0
BLOOD IN STOOL: 0
CONSTIPATION: 0
ABDOMINAL DISTENTION: 0
SHORTNESS OF BREATH: 0
ANAL BLEEDING: 0
ABDOMINAL PAIN: 1
RECTAL PAIN: 0
NAUSEA: 1
COUGH: 0
VOICE CHANGE: 0
VOMITING: 0
BACK PAIN: 0
TROUBLE SWALLOWING: 0
SORE THROAT: 0

## 2020-10-06 NOTE — PROGRESS NOTES
10/6/2020     Pt location: pt home    TELEHEALTH EVALUATION -- Audio/Visual (During [facility]-94 public health emergency)    HPI:    Sarah Chan (:  1970) has requested an audio/video evaluation for the following concern(s):    Pt made an appt due to c/o abd pain with eating. Had EGD/colonoscopy for eval of multiple GI complaints in 2020. Results in history per MA per OV policy, I reviewed results and we discussed it. Reports he has pain under right and left ribs and also in his mid back region. With some nausea at times. Greasy foods are triggers. GB is intact. No hx of pancreatitis. No lower GI complaints at this time. Review of Systems   Constitutional: Negative for appetite change, fatigue, fever and unexpected weight change. HENT: Negative for sore throat, trouble swallowing and voice change. Respiratory: Negative for cough and shortness of breath. Cardiovascular: Negative for chest pain, palpitations and leg swelling. Gastrointestinal: Positive for abdominal pain and nausea. Negative for abdominal distention, anal bleeding, blood in stool, constipation, diarrhea, rectal pain and vomiting. Genitourinary: Negative for hematuria. Musculoskeletal: Negative for arthralgias, back pain and neck pain. Neurological: Negative for dizziness, weakness, light-headedness and headaches. Psychiatric/Behavioral: Negative for dysphoric mood and sleep disturbance. The patient is not nervous/anxious. All other systems reviewed and are negative. Prior to Visit Medications    Medication Sig Taking?  Authorizing Provider   omeprazole (PRILOSEC) 20 MG delayed release capsule Take 1 capsule by mouth 2 times daily (before meals)  Guanakito Espinoza APRN - CNP   rOPINIRole (REQUIP) 0.5 MG tablet Take 1 q hs  VIVIANA Martinez   buPROPion (WELLBUTRIN XL) 150 MG extended release tablet Go down to 2 pills daily for 7 days (300mg) then down to 150mg once a day and add lexapro  Patient taking differently: Take 150 mg by mouth every morning   DOORTHY Barakat CNP   atorvastatin (LIPITOR) 40 MG tablet Take 1 tablet by mouth daily  DOROTHY Barakat CNP   blood glucose test strips (ONE TOUCH ULTRA TEST) strip USE TO TEST TWICE DAILY  DOROTHY Barakat CNP   Lancets MISC 1 each by Does not apply route 2 times daily  DOROTHY Barakat CNP   glucose monitoring kit (FREESTYLE) monitoring kit 1 kit by Does not apply route daily  DOROTHY Barakat CNP       Social History     Tobacco Use    Smoking status: Former Smoker     Packs/day: 0.25     Years: 15.00     Pack years: 3.75     Types: Cigarettes     Last attempt to quit: 2019     Years since quittin.7    Smokeless tobacco: Never Used   Substance Use Topics    Alcohol use: Not Currently    Drug use: Not Currently        PHYSICAL EXAMINATION:  [ INSTRUCTIONS:  \"[x]\" Indicates a positive item  \"[]\" Indicates a negative item  -- DELETE ALL ITEMS NOT EXAMINED]  Vital Signs: (As obtained by patient/caregiver or practitioner observation)    Blood pressure-  Heart rate-    Respiratory rate-    Temperature-  Pulse oximetry-     Constitutional: [x] Appears well-developed and well-nourished [x] No apparent distress      [] Abnormal-   Mental status  [x] Alert and awake  [x] Oriented to person/place/time [x]Able to follow commands      Eyes:  EOM    [x]  Normal  [] Abnormal-  Sclera  [x]  Normal  [] Abnormal -         Discharge [x]  None visible  [] Abnormal -    HENT:   [x] Normocephalic, atraumatic.   [] Abnormal   [x] Mouth/Throat: Mucous membranes are moist.     External Ears [x] Normal  [] Abnormal-     Neck: [x] No visualized mass     Pulmonary/Chest: [x] Respiratory effort normal.  [x] No visualized signs of difficulty breathing or respiratory distress        [] Abnormal-      Musculoskeletal:   [x] Normal gait with no signs of ataxia         [x] Normal range of motion of neck        [] Abnormal-       Neurological:        [x] No Facial Asymmetry (Cranial nerve 7 motor function) (limited exam to video visit)          [x] No gaze palsy        [] Abnormal-         Skin:        [x] No significant exanthematous lesions or discoloration noted on facial skin         [] Abnormal-            Psychiatric:       [x] Normal Affect [x] No Hallucinations        [] Abnormal-     Other pertinent observable physical exam findings-     ASSESSMENT/PLAN:  1. Upper abdominal pain  - Lipase; Future  - US GALLBLADDER RUQ; Future  - NM [facility] [employer]; Future    2. Nausea  - Lipase; Future  - US GALLBLADDER RUQ; Future  - NM [facility] [employer]; Future    3. Will call him with results of testing    Octavio Lebron is a 54 y.o. male being evaluated by a Virtual Visit (video visit) encounter to address concerns as mentioned above. A caregiver was present when appropriate. Due to this being a TeleHealth encounter (During [facility] public health emergency), evaluation of the following organ systems was limited: Vitals/Constitutional/EENT/Resp/CV/GI//MS/Neuro/Skin/Heme-Lymph-Imm. Pursuant to the emergency declaration under the [facility], [address] [place] [address] authority and the [employer] Act, this Virtual Visit was conducted with patient's (and/or legal guardian's) consent, to reduce the patient's risk of exposure to COVID-19 and provide necessary medical care. The patient (and/or legal guardian) has also been advised to contact this office for worsening conditions or problems, and seek emergency medical treatment and/or call 911 if deemed necessary. Patient identification was verified at the start of the visit: yes    Total time spent on this encounter: not billed based on time    Services were provided through a video synchronous discussion virtually to substitute for in-person clinic visit. Patient and provider were located at their individual homes.     --DOROTHY Vasquez

## 2020-10-23 ENCOUNTER — TELEPHONE (OUTPATIENT)
Dept: GASTROENTEROLOGY | Age: 50
End: 2020-10-23

## 2020-10-23 NOTE — TELEPHONE ENCOUNTER
10-23-20 Called reminded the patient that he needed to get his Lipase done the morning of his U/S on 10-27-20 either before or after the procedure. Oked per patient.  CV

## 2020-10-25 ENCOUNTER — TELEPHONE (OUTPATIENT)
Dept: GASTROENTEROLOGY | Age: 50
End: 2020-10-25

## 2020-10-26 NOTE — TELEPHONE ENCOUNTER
quita Hayes is on my schedule Tuesday to discuss results of EGD/colonoscopy. Xiao already seen him after scopes and discussed the results with him. So this appt can be cancelled. He was supposed to have further testing after I saw him in clinic and we would call him with results. I dont see he has had the testing done yet.

## 2020-10-27 ENCOUNTER — HOSPITAL ENCOUNTER (OUTPATIENT)
Dept: ULTRASOUND IMAGING | Age: 50
Discharge: HOME | End: 2020-10-27
Payer: MEDICAID

## 2020-10-27 ENCOUNTER — TELEPHONE (OUTPATIENT)
Dept: GASTROENTEROLOGY | Age: 50
End: 2020-10-27

## 2020-10-27 DIAGNOSIS — R11.0 NAUSEA: ICD-10-CM

## 2020-10-27 DIAGNOSIS — R10.10 UPPER ABDOMINAL PAIN: ICD-10-CM

## 2020-10-27 LAB — LIPASE: 36 U/L (ref 13–60)

## 2020-10-27 PROCEDURE — 76705 ECHO EXAM OF ABDOMEN: CPT

## 2020-10-27 NOTE — TELEPHONE ENCOUNTER
Please let pt know I have reviewed the labwork and ultrasound. The lipase is normal, no sign of acute pancreatitis based on this bloodwork. The ultrasound revealed fatty liver and gallstones/sludge in gallbladder. Treatment for fatty liver is gradual weight loss to get to goal weight and low saturated fat diet. And for the gallstones he can have a referral to surgeon of his choice.   thanks

## 2020-10-28 ENCOUNTER — OFFICE VISIT (OUTPATIENT)
Dept: SURGERY | Age: 50
End: 2020-10-28
Payer: MEDICAID

## 2020-10-28 VITALS
HEIGHT: 66 IN | SYSTOLIC BLOOD PRESSURE: 120 MMHG | WEIGHT: 258.6 LBS | BODY MASS INDEX: 41.56 KG/M2 | DIASTOLIC BLOOD PRESSURE: 78 MMHG | TEMPERATURE: 97.6 F

## 2020-10-28 PROCEDURE — G8484 FLU IMMUNIZE NO ADMIN: HCPCS | Performed by: PHYSICIAN ASSISTANT

## 2020-10-28 PROCEDURE — G8427 DOCREV CUR MEDS BY ELIG CLIN: HCPCS | Performed by: PHYSICIAN ASSISTANT

## 2020-10-28 PROCEDURE — G8417 CALC BMI ABV UP PARAM F/U: HCPCS | Performed by: PHYSICIAN ASSISTANT

## 2020-10-28 PROCEDURE — 99213 OFFICE O/P EST LOW 20 MIN: CPT | Performed by: PHYSICIAN ASSISTANT

## 2020-10-28 PROCEDURE — 1036F TOBACCO NON-USER: CPT | Performed by: PHYSICIAN ASSISTANT

## 2020-10-28 RX ORDER — TORSEMIDE 20 MG/1
20 TABLET ORAL 2 TIMES DAILY
COMMUNITY
Start: 2020-09-30

## 2020-10-28 RX ORDER — CYCLOBENZAPRINE HCL 10 MG
TABLET ORAL
COMMUNITY
Start: 2020-10-15 | End: 2020-11-02 | Stop reason: SDUPTHER

## 2020-10-28 RX ORDER — LISINOPRIL 20 MG/1
TABLET ORAL DAILY
COMMUNITY
Start: 2020-09-30

## 2020-10-28 RX ORDER — ARIPIPRAZOLE 5 MG/1
TABLET ORAL DAILY
COMMUNITY
Start: 2020-10-19 | End: 2021-01-11 | Stop reason: SDUPTHER

## 2020-10-28 NOTE — PROGRESS NOTES
HISTORY OF PRESENT ILLNESS:  Aki Argueta comes today to discuss possible gallbladder surgery. There is some reported post prandial abdominal pain that started several weeks ago. The patient denies fevers, chills or jaundice. Recent ultrasound has shown gallstones, . The common bile duct was normal in size. We discussed the pathophysiology of gallbladder disease and the risk benefits and alternatives of surgery. Tash Infante wishes to proceed. Patient Active Problem List    Diagnosis Date Noted    Upper abdominal pain 10/06/2020    Nausea 10/06/2020    Change in bowel habits 07/14/2020    Diarrhea 07/14/2020    Dysphagia 07/14/2020    Chronic heartburn 07/14/2020    Pharyngoesophageal dysphagia 07/02/2020    Obstructive sleep apnea 04/27/2020    Restless leg syndrome 04/27/2020    Hypoxemia 04/27/2020    CPAP (continuous positive airway pressure) dependence 04/27/2020    Depression with anxiety 01/27/2020    Smoker 01/27/2020    Hyperlipidemia 01/27/2020    Chronic obstructive pulmonary disease (HCC) 01/27/2020     Current Outpatient Medications   Medication Sig Dispense Refill    ARIPiprazole (ABILIFY) 5 MG tablet       lisinopril (PRINIVIL;ZESTRIL) 20 MG tablet       torsemide (DEMADEX) 20 MG tablet       cyclobenzaprine (FLEXERIL) 10 MG tablet       omeprazole (PRILOSEC) 20 MG delayed release capsule Take 1 capsule by mouth 2 times daily (before meals) 60 capsule 1    rOPINIRole (REQUIP) 0.5 MG tablet Take 1 q hs 30 tablet 3    atorvastatin (LIPITOR) 40 MG tablet Take 1 tablet by mouth daily 90 tablet 3    blood glucose test strips (ONE TOUCH ULTRA TEST) strip USE TO TEST TWICE DAILY 100 strip 3    Lancets MISC 1 each by Does not apply route 2 times daily 100 each 5    glucose monitoring kit (FREESTYLE) monitoring kit 1 kit by Does not apply route daily 1 kit 0     No current facility-administered medications for this visit. Allergies: Patient has no known allergies.      Past Medical History:   Diagnosis Date    COPD (chronic obstructive pulmonary disease) (HCC)     COVID-19     CPAP (continuous positive airway pressure) dependence     10cm to 20cm    Hyperlipidemia     Hypertension     Obstructive sleep apnea     AHI: 84.1 night one and 67.3 night two, per HST, 2020    KEM (obstructive sleep apnea)      Past Surgical History:   Procedure Laterality Date    [facility]      [facility]    [facility]ON[facility]COPY      [facility]- pt unsure of results or whom    COLONOSCOPY N/A 2020    Dr Dupont Ax prep, iInternal hemorrhoids-Grade 1, repeat with better prepif still symptomatic    ESOPHAGEAL DILATATION  2020    Dr Ale Noonan TONSILLECTOMY AND ADENOIDECTOMY      UPPER GASTROINTESTINAL ENDOSCOPY N/A 2020    Dr Steve Hernandez exam, empiric dil with #54F saucedo, NEG EoE, NEG celiac     Family History   Problem Relation Age of Onset    Diabetes Mother     High Blood Pressure Mother     Sleep Apnea Mother     Prostate Cancer Maternal Grandfather     Colon Polyps Neg Hx     Esophageal Cancer Neg Hx     Stomach Cancer Neg Hx     Rectal Cancer Neg Hx     Liver Cancer Neg Hx     Colon Cancer Neg Hx      Social History     Tobacco Use    Smoking status: Former Smoker     Packs/day: 0.25     Years: 15.00     Pack years: 3.75     Types: Cigarettes     Last attempt to quit: 2019     Years since quittin.8    Smokeless tobacco: Never Used   Substance Use Topics    Alcohol use: Not Currently         Review of sytems  Reviewed and positive for the above. Patient states he has had a recent negative stress test.    Blood pressure 120/78, temperature 97.6 °F (36.4 °C), temperature source Temporal, height 5' 6\" (1.676 m), weight 258 lb 9.6 oz (117.3 kg). GENERAL:  Reveals a 54 y.o. male that  appears to be in no acute distress.     HEENT:  Head is normocephalic and atraumatic. NECK:  Neck is supple without masses or carotid bruits. No obvious thyromegaly is grossly noted. CHEST:  Patient has normal respiratory effort. Chest is clear bilaterally with good thoracic expansion. HEART:  Heart demonstrated a regular rhythm and rate with no cardiac murmurs, gallops or rubs noted to auscultation. ABDOMEN:  Inspection of the abdomen demonstrated the patient to have normal bowel sounds present. The abdomen is soft and nontender. The abdomen is protuberant. EXTREMITIES:  1-2 + bilateral lower extremity edema    PSYCHIATRIC:  Patient is oriented to time, place and person. The patient's mood and affect are normal.      IMPRESSION:  Chronic Cholecystitis with Cholelithiasis  Obesity      PLAN:  The risks, benefits, and options were discussed with the patient. He  is willing to proceed with surgery.

## 2020-10-28 NOTE — LETTER
Preop Orders:     Patient: Janet Garrett  : 1970    Hospital:  [facility]     Admitting Physician:       Diagnosis: Cholecystitis with cholelithiasis                     Obesity                      Sleep apnea                      HTN    Procedure: laparoscopic cholecystectomy with cholangiogram    Anesthesia: General    Admission:Outpatient    Date: 20    Labs:  Please get echo from Marek hawkins for surgery  Per anesthesia prototocol    Pre-Op Meds:  Kefzol 2grm IV      Latex Allergy: no    Beta Blocker: no    Medication Instructions: No plavix for 2 weeks prior to procedure; no asprin for 3 days prior to procedure    This has been electronically signed by Mike Herrera M.D.

## 2020-11-02 RX ORDER — CYCLOBENZAPRINE HCL 10 MG
10 TABLET ORAL 3 TIMES DAILY PRN
Qty: 90 TABLET | Refills: 1 | Status: SHIPPED | OUTPATIENT
Start: 2020-11-02

## 2020-11-02 RX ORDER — OMEPRAZOLE 20 MG/1
20 CAPSULE, DELAYED RELEASE ORAL
Qty: 60 CAPSULE | Refills: 1 | Status: SHIPPED | OUTPATIENT
Start: 2020-11-02 | End: 2020-11-02 | Stop reason: SDUPTHER

## 2020-11-02 RX ORDER — ATORVASTATIN CALCIUM 40 MG/1
40 TABLET, FILM COATED ORAL DAILY
Qty: 90 TABLET | Refills: 3 | Status: SHIPPED | OUTPATIENT
Start: 2020-11-02 | End: 2021-02-17 | Stop reason: SDUPTHER

## 2020-11-02 RX ORDER — BUPROPION HYDROCHLORIDE 150 MG/1
150 TABLET ORAL EVERY MORNING
Qty: 30 TABLET | Refills: 3 | Status: SHIPPED | OUTPATIENT
Start: 2020-11-02 | End: 2021-02-17 | Stop reason: SDUPTHER

## 2020-11-02 RX ORDER — OMEPRAZOLE 20 MG/1
20 CAPSULE, DELAYED RELEASE ORAL
Qty: 60 CAPSULE | Refills: 3 | Status: SHIPPED | OUTPATIENT
Start: 2020-11-02 | End: 2021-03-17 | Stop reason: SDUPTHER

## 2020-11-13 ENCOUNTER — HOSPITAL ENCOUNTER (OUTPATIENT)
Dept: PREADMISSION TESTING | Age: 50
Discharge: HOME | End: 2020-11-17
Payer: MEDICAID

## 2020-11-13 VITALS — BODY MASS INDEX: 33.75 KG/M2 | WEIGHT: 210 LBS | HEIGHT: 66 IN

## 2020-11-13 LAB
EKG P AXIS: 44 DEGREES
EKG P-R INTERVAL: 146 MS
EKG Q-T INTERVAL: 368 MS
EKG QRS DURATION: 94 MS
EKG QTC CALCULATION (BAZETT): 403 MS
EKG T AXIS: 42 DEGREES
SARS-COV-2, PCR: NOT DETECTED

## 2020-11-13 PROCEDURE — 93010 ELECTROCARDIOGRAM REPORT: CPT | Performed by: INTERNAL MEDICINE

## 2020-11-13 PROCEDURE — U0003 INFECTIOUS AGENT DETECTION BY NUCLEIC ACID (DNA OR RNA); SEVERE ACUTE RESPIRATORY SYNDROME CORONAVIRUS 2 (SARS-COV-2) (CORONAVIRUS DISEASE [COVID-19]), AMPLIFIED PROBE TECHNIQUE, MAKING USE OF HIGH THROUGHPUT TECHNOLOGIES AS DESCRIBED BY CMS-2020-01-R: HCPCS

## 2020-11-13 PROCEDURE — 93005 ELECTROCARDIOGRAM TRACING: CPT | Performed by: SURGERY

## 2020-11-13 RX ORDER — CELECOXIB 200 MG/1
200 CAPSULE ORAL ONCE
Status: CANCELLED | OUTPATIENT
Start: 2020-11-17

## 2020-11-13 RX ORDER — ACETAMINOPHEN 325 MG/1
975 TABLET ORAL ONCE
Status: CANCELLED | OUTPATIENT
Start: 2020-11-17

## 2020-11-13 RX ORDER — GABAPENTIN 300 MG/1
300 CAPSULE ORAL ONCE
Status: CANCELLED | OUTPATIENT
Start: 2020-11-17

## 2020-11-16 ENCOUNTER — ANESTHESIA EVENT (OUTPATIENT)
Dept: OPERATING ROOM | Age: 50
End: 2020-11-16
Payer: MEDICAID

## 2020-11-17 ENCOUNTER — ANESTHESIA (OUTPATIENT)
Dept: OPERATING ROOM | Age: 50
End: 2020-11-17
Payer: MEDICAID

## 2020-11-17 ENCOUNTER — HOSPITAL ENCOUNTER (OUTPATIENT)
Age: 50
Setting detail: OUTPATIENT SURGERY
Discharge: HOME | End: 2020-11-17
Attending: SURGERY | Admitting: SURGERY
Payer: MEDICAID

## 2020-11-17 ENCOUNTER — APPOINTMENT (OUTPATIENT)
Dept: GENERAL RADIOLOGY | Age: 50
End: 2020-11-17
Attending: SURGERY
Payer: MEDICAID

## 2020-11-17 VITALS
HEIGHT: 66 IN | BODY MASS INDEX: 40.18 KG/M2 | DIASTOLIC BLOOD PRESSURE: 80 MMHG | RESPIRATION RATE: 20 BRPM | OXYGEN SATURATION: 96 % | WEIGHT: 250 LBS | TEMPERATURE: 97.2 F | HEART RATE: 89 BPM | SYSTOLIC BLOOD PRESSURE: 139 MMHG

## 2020-11-17 VITALS
SYSTOLIC BLOOD PRESSURE: 133 MMHG | TEMPERATURE: 97 F | RESPIRATION RATE: 2 BRPM | DIASTOLIC BLOOD PRESSURE: 71 MMHG | OXYGEN SATURATION: 86 %

## 2020-11-17 PROBLEM — K80.10 CHRONIC CHOLECYSTITIS WITH CALCULUS: Status: ACTIVE | Noted: 2020-11-17

## 2020-11-17 PROBLEM — K42.9 UMBILICAL HERNIA WITHOUT OBSTRUCTION AND WITHOUT GANGRENE: Status: ACTIVE | Noted: 2020-11-17

## 2020-11-17 PROCEDURE — 2580000003 HC RX 258: Performed by: ANESTHESIOLOGY

## 2020-11-17 PROCEDURE — C1729 CATH, DRAINAGE: HCPCS | Performed by: SURGERY

## 2020-11-17 PROCEDURE — 2500000003 HC RX 250 WO HCPCS: Performed by: SURGERY

## 2020-11-17 PROCEDURE — 6360000002 HC RX W HCPCS: Performed by: NURSE ANESTHETIST, CERTIFIED REGISTERED

## 2020-11-17 PROCEDURE — 6370000000 HC RX 637 (ALT 250 FOR IP): Performed by: SURGERY

## 2020-11-17 PROCEDURE — 6360000002 HC RX W HCPCS: Performed by: SURGERY

## 2020-11-17 PROCEDURE — 74300 X-RAY BILE DUCTS/PANCREAS: CPT

## 2020-11-17 PROCEDURE — 49585 REPAIR UMBILICAL HERN,5+Y/O,REDUC: CPT | Performed by: SURGERY

## 2020-11-17 PROCEDURE — 7100000011 HC PHASE II RECOVERY - ADDTL 15 MIN: Performed by: SURGERY

## 2020-11-17 PROCEDURE — 3600000014 HC SURGERY LEVEL 4 ADDTL 15MIN: Performed by: SURGERY

## 2020-11-17 PROCEDURE — 2709999900 HC NON-CHARGEABLE SUPPLY: Performed by: SURGERY

## 2020-11-17 PROCEDURE — 3700000001 HC ADD 15 MINUTES (ANESTHESIA): Performed by: SURGERY

## 2020-11-17 PROCEDURE — 88304 TISSUE EXAM BY PATHOLOGIST: CPT

## 2020-11-17 PROCEDURE — 7100000010 HC PHASE II RECOVERY - FIRST 15 MIN: Performed by: SURGERY

## 2020-11-17 PROCEDURE — 7100000000 HC PACU RECOVERY - FIRST 15 MIN: Performed by: SURGERY

## 2020-11-17 PROCEDURE — 3700000000 HC ANESTHESIA ATTENDED CARE: Performed by: SURGERY

## 2020-11-17 PROCEDURE — 47563 LAPARO CHOLECYSTECTOMY/GRAPH: CPT | Performed by: SURGERY

## 2020-11-17 PROCEDURE — 7100000001 HC PACU RECOVERY - ADDTL 15 MIN: Performed by: SURGERY

## 2020-11-17 PROCEDURE — 3600000004 HC SURGERY LEVEL 4 BASE: Performed by: SURGERY

## 2020-11-17 PROCEDURE — 47563 LAPARO CHOLECYSTECTOMY/GRAPH: CPT | Performed by: PHYSICIAN ASSISTANT

## 2020-11-17 PROCEDURE — 88302 TISSUE EXAM BY PATHOLOGIST: CPT

## 2020-11-17 PROCEDURE — 2500000003 HC RX 250 WO HCPCS: Performed by: NURSE ANESTHETIST, CERTIFIED REGISTERED

## 2020-11-17 PROCEDURE — 3209999900 FLUORO FOR SURGICAL PROCEDURES

## 2020-11-17 RX ORDER — DIPHENHYDRAMINE HYDROCHLORIDE 50 MG/ML
12.5 INJECTION INTRAMUSCULAR; INTRAVENOUS
Status: DISCONTINUED | OUTPATIENT
Start: 2020-11-17 | End: 2020-11-17 | Stop reason: HOSPADM

## 2020-11-17 RX ORDER — SODIUM CHLORIDE 0.9 % (FLUSH) 0.9 %
10 SYRINGE (ML) INJECTION EVERY 12 HOURS SCHEDULED
Status: DISCONTINUED | OUTPATIENT
Start: 2020-11-17 | End: 2020-11-17 | Stop reason: HOSPADM

## 2020-11-17 RX ORDER — HYDROMORPHONE HYDROCHLORIDE 1 MG/ML
0.5 INJECTION, SOLUTION INTRAMUSCULAR; INTRAVENOUS; SUBCUTANEOUS EVERY 5 MIN PRN
Status: DISCONTINUED | OUTPATIENT
Start: 2020-11-17 | End: 2020-11-17 | Stop reason: HOSPADM

## 2020-11-17 RX ORDER — DEXAMETHASONE SODIUM PHOSPHATE 10 MG/ML
INJECTION, SOLUTION INTRAMUSCULAR; INTRAVENOUS PRN
Status: DISCONTINUED | OUTPATIENT
Start: 2020-11-17 | End: 2020-11-17 | Stop reason: SDUPTHER

## 2020-11-17 RX ORDER — BUPIVACAINE HYDROCHLORIDE 2.5 MG/ML
INJECTION, SOLUTION INFILTRATION; PERINEURAL PRN
Status: DISCONTINUED | OUTPATIENT
Start: 2020-11-17 | End: 2020-11-17 | Stop reason: ALTCHOICE

## 2020-11-17 RX ORDER — LIDOCAINE HYDROCHLORIDE 10 MG/ML
INJECTION, SOLUTION INFILTRATION; PERINEURAL PRN
Status: DISCONTINUED | OUTPATIENT
Start: 2020-11-17 | End: 2020-11-17 | Stop reason: SDUPTHER

## 2020-11-17 RX ORDER — SODIUM CHLORIDE, SODIUM LACTATE, POTASSIUM CHLORIDE, CALCIUM CHLORIDE 600; 310; 30; 20 MG/100ML; MG/100ML; MG/100ML; MG/100ML
INJECTION, SOLUTION INTRAVENOUS CONTINUOUS
Status: DISCONTINUED | OUTPATIENT
Start: 2020-11-17 | End: 2020-11-17 | Stop reason: HOSPADM

## 2020-11-17 RX ORDER — ONDANSETRON 2 MG/ML
INJECTION INTRAMUSCULAR; INTRAVENOUS PRN
Status: DISCONTINUED | OUTPATIENT
Start: 2020-11-17 | End: 2020-11-17 | Stop reason: SDUPTHER

## 2020-11-17 RX ORDER — MIDAZOLAM HYDROCHLORIDE 1 MG/ML
INJECTION INTRAMUSCULAR; INTRAVENOUS PRN
Status: DISCONTINUED | OUTPATIENT
Start: 2020-11-17 | End: 2020-11-17 | Stop reason: SDUPTHER

## 2020-11-17 RX ORDER — MEPERIDINE HYDROCHLORIDE 50 MG/ML
12.5 INJECTION INTRAMUSCULAR; INTRAVENOUS; SUBCUTANEOUS EVERY 5 MIN PRN
Status: DISCONTINUED | OUTPATIENT
Start: 2020-11-17 | End: 2020-11-17 | Stop reason: HOSPADM

## 2020-11-17 RX ORDER — CELECOXIB 200 MG/1
200 CAPSULE ORAL ONCE
Status: COMPLETED | OUTPATIENT
Start: 2020-11-17 | End: 2020-11-17

## 2020-11-17 RX ORDER — MIDAZOLAM HYDROCHLORIDE 1 MG/ML
2 INJECTION INTRAMUSCULAR; INTRAVENOUS
Status: DISCONTINUED | OUTPATIENT
Start: 2020-11-17 | End: 2020-11-17 | Stop reason: HOSPADM

## 2020-11-17 RX ORDER — SODIUM CHLORIDE 0.9 % (FLUSH) 0.9 %
10 SYRINGE (ML) INJECTION PRN
Status: DISCONTINUED | OUTPATIENT
Start: 2020-11-17 | End: 2020-11-17 | Stop reason: HOSPADM

## 2020-11-17 RX ORDER — PROMETHAZINE HYDROCHLORIDE 25 MG/ML
6.25 INJECTION, SOLUTION INTRAMUSCULAR; INTRAVENOUS
Status: DISCONTINUED | OUTPATIENT
Start: 2020-11-17 | End: 2020-11-17 | Stop reason: HOSPADM

## 2020-11-17 RX ORDER — HYDROCODONE BITARTRATE AND ACETAMINOPHEN 5; 325 MG/1; MG/1
1 TABLET ORAL EVERY 6 HOURS PRN
Qty: 10 TABLET | Refills: 0 | Status: SHIPPED | OUTPATIENT
Start: 2020-11-17 | End: 2021-02-17

## 2020-11-17 RX ORDER — HYDRALAZINE HYDROCHLORIDE 20 MG/ML
5 INJECTION INTRAMUSCULAR; INTRAVENOUS EVERY 10 MIN PRN
Status: DISCONTINUED | OUTPATIENT
Start: 2020-11-17 | End: 2020-11-17 | Stop reason: HOSPADM

## 2020-11-17 RX ORDER — LIDOCAINE HYDROCHLORIDE 10 MG/ML
1 INJECTION, SOLUTION EPIDURAL; INFILTRATION; INTRACAUDAL; PERINEURAL
Status: DISCONTINUED | OUTPATIENT
Start: 2020-11-17 | End: 2020-11-17 | Stop reason: HOSPADM

## 2020-11-17 RX ORDER — PROPOFOL 10 MG/ML
INJECTION, EMULSION INTRAVENOUS PRN
Status: DISCONTINUED | OUTPATIENT
Start: 2020-11-17 | End: 2020-11-17 | Stop reason: SDUPTHER

## 2020-11-17 RX ORDER — MORPHINE SULFATE 4 MG/ML
4 INJECTION, SOLUTION INTRAMUSCULAR; INTRAVENOUS EVERY 5 MIN PRN
Status: DISCONTINUED | OUTPATIENT
Start: 2020-11-17 | End: 2020-11-17 | Stop reason: HOSPADM

## 2020-11-17 RX ORDER — ACETAMINOPHEN 325 MG/1
975 TABLET ORAL ONCE
Status: COMPLETED | OUTPATIENT
Start: 2020-11-17 | End: 2020-11-17

## 2020-11-17 RX ORDER — ENALAPRILAT 2.5 MG/2ML
1.25 INJECTION INTRAVENOUS
Status: DISCONTINUED | OUTPATIENT
Start: 2020-11-17 | End: 2020-11-17 | Stop reason: HOSPADM

## 2020-11-17 RX ORDER — FENTANYL CITRATE 50 UG/ML
25 INJECTION, SOLUTION INTRAMUSCULAR; INTRAVENOUS
Status: DISCONTINUED | OUTPATIENT
Start: 2020-11-17 | End: 2020-11-17 | Stop reason: HOSPADM

## 2020-11-17 RX ORDER — SUCCINYLCHOLINE CHLORIDE 20 MG/ML
INJECTION INTRAMUSCULAR; INTRAVENOUS PRN
Status: DISCONTINUED | OUTPATIENT
Start: 2020-11-17 | End: 2020-11-17 | Stop reason: SDUPTHER

## 2020-11-17 RX ORDER — LABETALOL HYDROCHLORIDE 5 MG/ML
5 INJECTION, SOLUTION INTRAVENOUS EVERY 10 MIN PRN
Status: DISCONTINUED | OUTPATIENT
Start: 2020-11-17 | End: 2020-11-17 | Stop reason: HOSPADM

## 2020-11-17 RX ORDER — HYDROCODONE BITARTRATE AND ACETAMINOPHEN 5; 325 MG/1; MG/1
1 TABLET ORAL ONCE
Status: COMPLETED | OUTPATIENT
Start: 2020-11-17 | End: 2020-11-17

## 2020-11-17 RX ORDER — SODIUM CHLORIDE 9 MG/ML
INJECTION, SOLUTION INTRAVENOUS CONTINUOUS
Status: DISCONTINUED | OUTPATIENT
Start: 2020-11-17 | End: 2020-11-17 | Stop reason: HOSPADM

## 2020-11-17 RX ORDER — MORPHINE SULFATE 4 MG/ML
2 INJECTION, SOLUTION INTRAMUSCULAR; INTRAVENOUS EVERY 5 MIN PRN
Status: DISCONTINUED | OUTPATIENT
Start: 2020-11-17 | End: 2020-11-17 | Stop reason: HOSPADM

## 2020-11-17 RX ORDER — SCOLOPAMINE TRANSDERMAL SYSTEM 1 MG/1
1 PATCH, EXTENDED RELEASE TRANSDERMAL
Status: DISCONTINUED | OUTPATIENT
Start: 2020-11-17 | End: 2020-11-17 | Stop reason: HOSPADM

## 2020-11-17 RX ORDER — FENTANYL CITRATE 50 UG/ML
50 INJECTION, SOLUTION INTRAMUSCULAR; INTRAVENOUS
Status: DISCONTINUED | OUTPATIENT
Start: 2020-11-17 | End: 2020-11-17 | Stop reason: HOSPADM

## 2020-11-17 RX ORDER — FENTANYL CITRATE 50 UG/ML
INJECTION, SOLUTION INTRAMUSCULAR; INTRAVENOUS PRN
Status: DISCONTINUED | OUTPATIENT
Start: 2020-11-17 | End: 2020-11-17 | Stop reason: SDUPTHER

## 2020-11-17 RX ORDER — HYDROMORPHONE HYDROCHLORIDE 1 MG/ML
0.25 INJECTION, SOLUTION INTRAMUSCULAR; INTRAVENOUS; SUBCUTANEOUS EVERY 5 MIN PRN
Status: DISCONTINUED | OUTPATIENT
Start: 2020-11-17 | End: 2020-11-17 | Stop reason: HOSPADM

## 2020-11-17 RX ORDER — ONDANSETRON 2 MG/ML
4 INJECTION INTRAMUSCULAR; INTRAVENOUS ONCE
Status: DISCONTINUED | OUTPATIENT
Start: 2020-11-17 | End: 2020-11-17 | Stop reason: HOSPADM

## 2020-11-17 RX ORDER — GABAPENTIN 300 MG/1
300 CAPSULE ORAL ONCE
Status: COMPLETED | OUTPATIENT
Start: 2020-11-17 | End: 2020-11-17

## 2020-11-17 RX ORDER — ROCURONIUM BROMIDE 10 MG/ML
INJECTION, SOLUTION INTRAVENOUS PRN
Status: DISCONTINUED | OUTPATIENT
Start: 2020-11-17 | End: 2020-11-17 | Stop reason: SDUPTHER

## 2020-11-17 RX ORDER — METOCLOPRAMIDE HYDROCHLORIDE 5 MG/ML
10 INJECTION INTRAMUSCULAR; INTRAVENOUS
Status: DISCONTINUED | OUTPATIENT
Start: 2020-11-17 | End: 2020-11-17 | Stop reason: HOSPADM

## 2020-11-17 RX ADMIN — MIDAZOLAM 2 MG: 1 INJECTION INTRAMUSCULAR; INTRAVENOUS at 08:23

## 2020-11-17 RX ADMIN — ACETAMINOPHEN 975 MG: 325 TABLET ORAL at 07:04

## 2020-11-17 RX ADMIN — FENTANYL CITRATE 25 MCG: 50 INJECTION INTRAMUSCULAR; INTRAVENOUS at 09:10

## 2020-11-17 RX ADMIN — SODIUM CHLORIDE, POTASSIUM CHLORIDE, SODIUM LACTATE AND CALCIUM CHLORIDE: 600; 310; 30; 20 INJECTION, SOLUTION INTRAVENOUS at 07:04

## 2020-11-17 RX ADMIN — ONDANSETRON HYDROCHLORIDE 4 MG: 2 INJECTION, SOLUTION INTRAMUSCULAR; INTRAVENOUS at 09:23

## 2020-11-17 RX ADMIN — PROPOFOL 200 MG: 10 INJECTION, EMULSION INTRAVENOUS at 08:26

## 2020-11-17 RX ADMIN — SUCCINYLCHOLINE CHLORIDE 140 MG: 20 INJECTION, SOLUTION INTRAMUSCULAR; INTRAVENOUS at 08:26

## 2020-11-17 RX ADMIN — FENTANYL CITRATE 25 MCG: 50 INJECTION INTRAMUSCULAR; INTRAVENOUS at 09:32

## 2020-11-17 RX ADMIN — FENTANYL CITRATE 25 MCG: 50 INJECTION INTRAMUSCULAR; INTRAVENOUS at 09:01

## 2020-11-17 RX ADMIN — FENTANYL CITRATE 50 MCG: 50 INJECTION INTRAMUSCULAR; INTRAVENOUS at 08:37

## 2020-11-17 RX ADMIN — PHENYLEPHRINE HYDROCHLORIDE 100 MCG: 10 INJECTION INTRAVENOUS at 08:35

## 2020-11-17 RX ADMIN — LIDOCAINE HYDROCHLORIDE 5 ML: 10 INJECTION, SOLUTION INFILTRATION; PERINEURAL at 08:25

## 2020-11-17 RX ADMIN — FENTANYL CITRATE 25 MCG: 50 INJECTION INTRAMUSCULAR; INTRAVENOUS at 09:57

## 2020-11-17 RX ADMIN — Medication 2 G: at 08:33

## 2020-11-17 RX ADMIN — SUGAMMADEX 230 MG: 100 INJECTION, SOLUTION INTRAVENOUS at 09:51

## 2020-11-17 RX ADMIN — CELECOXIB 200 MG: 200 CAPSULE ORAL at 07:04

## 2020-11-17 RX ADMIN — SODIUM CHLORIDE, SODIUM LACTATE, POTASSIUM CHLORIDE, AND CALCIUM CHLORIDE: 600; 310; 30; 20 INJECTION, SOLUTION INTRAVENOUS at 08:23

## 2020-11-17 RX ADMIN — ROCURONIUM BROMIDE 45 MG: 10 INJECTION, SOLUTION INTRAVENOUS at 08:32

## 2020-11-17 RX ADMIN — DEXAMETHASONE SODIUM PHOSPHATE 10 MG: 10 INJECTION, SOLUTION INTRAMUSCULAR; INTRAVENOUS at 08:33

## 2020-11-17 RX ADMIN — GABAPENTIN 300 MG: 300 CAPSULE ORAL at 07:05

## 2020-11-17 RX ADMIN — FENTANYL CITRATE 50 MCG: 50 INJECTION INTRAMUSCULAR; INTRAVENOUS at 08:25

## 2020-11-17 RX ADMIN — HYDROCODONE BITARTRATE AND ACETAMINOPHEN 1 TABLET: 5; 325 TABLET ORAL at 11:21

## 2020-11-17 RX ADMIN — FENTANYL CITRATE 50 MCG: 50 INJECTION INTRAMUSCULAR; INTRAVENOUS at 08:47

## 2020-11-17 ASSESSMENT — PAIN SCALES - GENERAL
PAINLEVEL_OUTOF10: 10
PAINLEVEL_OUTOF10: 0
PAINLEVEL_OUTOF10: 10

## 2020-11-17 ASSESSMENT — LIFESTYLE VARIABLES: SMOKING_STATUS: 0

## 2020-11-17 ASSESSMENT — ENCOUNTER SYMPTOMS: SHORTNESS OF BREATH: 0

## 2020-11-17 ASSESSMENT — PAIN - FUNCTIONAL ASSESSMENT: PAIN_FUNCTIONAL_ASSESSMENT: 0-10

## 2020-11-17 NOTE — ANESTHESIA PRE PROCEDURE
Department of Anesthesiology  Preprocedure Note       Name:  Vi Crain   Age:  52 y.o.  :  1970                                          MRN:  [id number]         Date:  2020      Surgeon: Amarilis Meng):  Ophelia Hernandez MD    Procedure: Procedure(s):  LAPAROSCOPIC CHOLECYSTECTOMY WITH CHOLANGIOGRAM    Medications prior to admission:   Prior to Admission medications    Medication Sig Start Date End Date Taking?  Authorizing Provider   mupirocin (BACTROBAN) 2 % ointment Apply to each nostril BID 5 days prior to surgery 20   Ophelia Hernandez MD   atorvastatin (LIPITOR) 40 MG tablet Take 1 tablet by mouth daily 20   Miles Claude, APRN - CNP   omeprazole (PRILOSEC) 20 MG delayed release capsule Take 1 capsule by mouth 2 times daily (before meals) 20   Miles Claude, APRN - CNP   cyclobenzaprine (FLEXERIL) 10 MG tablet Take 1 tablet by mouth 3 times daily as needed for Muscle spasms 20   Miles Claude, APRN - CNP   buPROPion (WELLBUTRIN XL) 150 MG extended release tablet Take 1 tablet by mouth every morning 20   Miles Claude, APRN - CNP   ARIPiprazole (ABILIFY) 5 MG tablet  10/19/20   Historical Provider, MD   lisinopril (PRINIVIL;ZESTRIL) 20 MG tablet  20   Historical Provider, MD   torsemide (DEMADEX) 20 MG tablet  20   Historical Provider, MD   rOPINIRole (REQUIP) 0.5 MG tablet Take 1 q hs 20   VIVIANA Neff   blood glucose test strips (ONE TOUCH ULTRA TEST) strip USE TO TEST TWICE DAILY 2/10/20   Miles Claude, APRN - CNP   Lancets MISC 1 each by Does not apply route 2 times daily 19   Miles Claude, APRN - CNP   glucose monitoring kit (FREESTYLE) monitoring kit 1 kit by Does not apply route daily 19   Miles Claude, APRN - CNP       Current medications:    Current Facility-Administered Medications   Medication Dose Route Frequency Provider Last Rate Last Dose    ceFAZolin (ANCEF) 2 g in sterile water 20 mL IV syringe  2 g Intravenous Once Jojo Wright MD        acetaminophen (TYLENOL) tablet 975 mg  975 mg Oral Once Jojo Wright MD        celecoxib (CELEBREX) capsule 200 mg  200 mg Oral Once Jojo Wright MD        gabapentin (NEURONTIN) capsule 300 mg  300 mg Oral Once Jojo Wright MD        lactated ringers infusion   Intravenous Continuous Wai Phan MD           Allergies:  No Known Allergies    Problem List:    Patient Active Problem List   Diagnosis Code    Depression with anxiety F41.8    Smoker F17.200    Hyperlipidemia E78.5    Chronic obstructive pulmonary disease ([facility] [address] J44.9    Obstructive sleep apnea G47.33    Restless leg syndrome G25.81    Hypoxemia R09.02    CPAP (continuous positive airway pressure) dependence Z99.89    Pharyngoesophageal dysphagia R13.14    Change in bowel habits R19.4    Diarrhea R19.7    Dysphagia R13.10    Chronic heartburn R12    Upper abdominal pain R10.10    Nausea R11.0       Past Medical History:        Diagnosis Date    COPD [facility]ch[facility] obstructive pulmonary disease) ([facility])     COVID-19     CPAP (continuous positive airway pressure) dependence     10cm to 20cm    Hyperlipidemia     Hypertension     Obstructive sleep apnea     AHI: 84.1 night one and 67.3 night two, per HST, 1/2020    KEM (obstructive sleep apnea)        Past Surgical History:        Procedure Laterality Date    [facility]    [facility]  [facility]    [facility]- pt unsure of results or whom    COLONOSCOPY N/A 9/24/2020    Dr Shane Rico prep, iInternal hemorrhoids-Grade 1, repeat with better prepif still symptomatic    ESOPHAGEAL DILATATION  9/24/2020    Dr Ирина Vegas Stabs TONSILLECTOMY AND ADENOIDECTOMY      UPPER GASTROINTESTINAL ENDOSCOPY N/A 9/24/2020    Dr Brittney Montgomery exam, empiric dil with #54F lewis, NEG EoE, NEG celiac       Social History:    Social History     Tobacco Use  Smoking status: Former Smoker     Packs/day: 0.25     Years: 15.00     Pack years: 3.75     Types: Cigarettes     Last attempt to quit: 2019     Years since quittin.8    Smokeless tobacco: Never Used   Substance Use Topics    Alcohol use: Not Currently                                Counseling given: Not Answered      Vital Signs (Current): There were no vitals filed for this visit. BP Readings from Last 3 Encounters:   10/28/20 120/78   20 126/83   20 (!) 110/56       NPO Status:                                                                                 BMI:   Wt Readings from Last 3 Encounters:   20 210 lb (95.3 kg)   10/28/20 258 lb 9.6 oz (117.3 kg)   20 253 lb (114.8 kg)     There is no height or weight on file to calculate BMI.    CBC:   Lab Results   Component Value Date    WBC 6.9 2020    RBC 4.73 2020    HGB 14.1 2020    HCT 43.1 2020    MCV 91.1 2020    RDW 12.9 2020     2020       CMP:   Lab Results   Component Value Date     2020    K 4.3 2020    CL 97 2020    CO2 27 2020    BUN 12 2020    CREATININE 0.9 2020    GFRAA >59 2020    LABGLOM >60 2020    GLUCOSE 105 2020    PROT 6.8 2020    CALCIUM 9.3 2020    BILITOT 1.3 2020    ALKPHOS 66 2020    AST 23 2020    ALT 40 2020       POC Tests: No results for input(s): POCGLU, POCNA, POCK, POCCL, POCBUN, POCHEMO, POCHCT in the last 72 hours.     Coags: No results found for: PROTIME, INR, APTT    HCG (If Applicable): No results found for: PREGTESTUR, PREGSERUM, HCG, HCGQUANT     ABGs: No results found for: PHART, PO2ART, TJO7YCZ, DVX8VPQ, BEART, O3WNZOHI     Type & Screen (If Applicable):  No results found for: LABABO, LABRH    Drug/Infectious Status (If Applicable):  No results found for: HIV, HEPCAB    COVID-19 Screening (If Applicable): Lab Results   Component Value Date    COVID19 Not Detected 2020         Anesthesia Evaluation  Patient summary reviewed and Nursing notes reviewed no history of anesthetic complications:   Airway: Mallampati: I  TM distance: >3 FB   Neck ROM: full  Mouth opening: > = 3 FB Dental:    (+) edentulous      Pulmonary:   (+) COPD:  sleep apnea: on noncompliant,      (-) shortness of breath and not a current smoker                           Cardiovascular:  Exercise tolerance: good (>4 METS),   (+) hypertension:, CHF: diastolic,     (-) pacemaker, past MI, CAD, CABG/stent, dysrhythmias and  angina    ECG reviewed               Beta Blocker:  Not on Beta Blocker      ROS comment: EK BPM  Sinus rhythm  Comparison Summary: No serial comparison made  Summary: Normal ECG     Neuro/Psych:   (+) psychiatric history:   (-) seizures and CVA           GI/Hepatic/Renal:   (+) GERD: poorly controlled, morbid obesity     (-) liver disease and no renal disease       Endo/Other:    (+) no malignancy/cancer. (-) diabetes mellitus, blood dyscrasia, no malignancy/cancer               Abdominal:   (+) obese,         Vascular:     - DVT and PE. Anesthesia Plan      general     ASA 3     (Pepcid, reglan, zofran and scop in holding.)  Induction: intravenous. BIS  MIPS: Postoperative opioids intended and Prophylactic antiemetics administered. Anesthetic plan and risks discussed with patient.                       Cm Pace DO   2020

## 2020-11-17 NOTE — H&P
HISTORY OF PRESENT ILLNESS:  Chi Spivey comes today to discuss possible gallbladder surgery. There is some reported post prandial abdominal pain that started several weeks ago. The patient denies fevers, chills or jaundice. Recent ultrasound has shown gallstones, . The common bile duct was normal in size.     We discussed the pathophysiology of gallbladder disease and the risk benefits and alternatives of surgery.   Lore Vazquez wishes to proceed.          Patient Active Problem List     Diagnosis Date Noted    Upper abdominal pain 10/06/2020    Nausea 10/06/2020    Change in bowel habits 07/14/2020    Diarrhea 07/14/2020    Dysphagia 07/14/2020    Chronic heartburn 07/14/2020    Pharyngoesophageal dysphagia 07/02/2020    Obstructive sleep apnea 04/27/2020    Restless leg syndrome 04/27/2020    Hypoxemia 04/27/2020    CPAP (continuous positive airway pressure) dependence 04/27/2020    Depression with anxiety 01/27/2020    Smoker 01/27/2020    Hyperlipidemia 01/27/2020    Chronic obstructive pulmonary disease ([facility] [address] 01/27/2020      Current Facility-Administered Medications          Current Outpatient Medications   Medication Sig Dispense Refill    ARIPiprazole (ABILIFY) 5 MG tablet          lisinopril (PRINIVIL;ZESTRIL) 20 MG tablet          torsemide (DEMADEX) 20 MG tablet          cyclobenzaprine (FLEXERIL) 10 MG tablet          omeprazole (PRILOSEC) 20 MG delayed release capsule Take 1 capsule by mouth 2 times daily (before meals) 60 capsule 1    rOPINIRole (REQUIP) 0.5 MG tablet Take 1 q hs 30 tablet 3    atorvastatin (LIPITOR) 40 MG tablet Take 1 tablet by mouth daily 90 tablet 3    blood glucose test strips (ONE TOUCH ULTRA TEST) strip USE TO TEST TWICE DAILY 100 strip 3    Lancets MISC 1 each by Does not apply route 2 times daily 100 each 5    glucose monitoring kit (FREESTYLE) monitoring kit 1 kit by Does not apply route daily 1 kit 0      No current facility-administered °C), temperature source Temporal, height 5' 6\" (1.676 m), weight 258 lb 9.6 oz (117.3 kg).     GENERAL:  Reveals a 54 y.o. male that  appears to be in no acute distress.     HEENT:  Head is normocephalic and atraumatic.     NECK:  Neck is supple without masses or carotid bruits. No obvious thyromegaly is grossly noted.     CHEST:  Patient has normal respiratory effort. Chest is clear bilaterally with good thoracic expansion.       HEART:  Heart demonstrated a regular rhythm and rate with no cardiac murmurs, gallops or rubs noted to auscultation.     ABDOMEN:  Inspection of the abdomen demonstrated the patient to have normal bowel sounds present. The abdomen is soft and nontender. The abdomen is protuberant.        EXTREMITIES:  1-2 + bilateral lower extremity edema     PSYCHIATRIC:  Patient is oriented to time, place and person. The patient's mood and affect are normal.        IMPRESSION:  Chronic Cholecystitis with Cholelithiasis  Obesity        PLAN:  The risks, benefits, and options were discussed with the patient. He  is willing to proceed with surgery.

## 2020-11-17 NOTE — OP NOTE
[facility] [address], [address] [facility]                                OPERATIVE REPORT    PATIENT NAME: Shay Liu                   :        1970  MED REC NO:   [id number]                              ROOM:  ACCOUNT NO:   [de-identified]                           ADMIT DATE: 2020  PROVIDER:     Sara Galvan MD    DATE OF PROCEDURE:  2020    PREOPERATIVE DIAGNOSES:  Chronic cholecystitis and cholelithiasis. POSTOPERATIVE DIAGNOSES:  Chronic cholecystitis and cholelithiasis plus  umbilical hernia. PROCEDURES:  Laparoscopic cholecystectomy with intraoperative  cholangiogram and umbilical hernia repair. SURGEON:  Sara Galvan MD    ASSISTANT:  Branden Calderon PA-C. He was present for all portions of  the case including all critical portions as well as closure. ANESTHESIA:  General.    INDICATIONS:  The patient is a 45-year-old gentleman with symptomatic  gallstones. We discussed risks and benefits of laparoscopic  cholecystectomy. He understands and is agreeable. OPERATIVE PROCEDURE:  Today, he was brought to the operating room,  underwent adequate general anesthesia, prepped and draped in the sterile  fashion. A curvilinear incision was made in the inferior aspect of the  umbilicus and encountered a moderate-sized umbilical hernia  approximately a centimeter and a half defect. We dissected the hernia  sac free and then entered the abdomen, inserted a blunt port,  insufflated the abdomen, placed all the three trocars under direct  vision. We required an extra trocar in the left upper quadrant for fan  retractor and _____ port was a little too superior, so we placed another  one about 2 inches inferior to that one. We then grasped the fundus of  the gallbladder, elevated superiorly, and carefully dissected out the  cystic artery and cystic duct.   Once this was accomplished, we doubly  clipped proximally and distally on the cystic artery, doubly clipped  proximally on the cystic duct, opened the cystic duct, obtained a good  quality cholangiogram that was normal.  We then clipped distally on the  cystic duct, divided the cystic duct, divided the cystic artery, and  took the gallbladder retrograde using Bovie electrocautery. We brought  the gallbladder out through the umbilicus using the Endo Catch  containing multiple tiny stones. We then irrigated copiously, made sure  good hemostasis, closed the umbilical hernia with 0 Vicryl sutures, the  subcu with 3-0 Vicryl, and skin with 4-0 Monocryl. Sterile dressings  were applied. Estimated blood loss, minimal.  Complications, none. He  tolerated the procedure well.         Cory Del Cid MD    D: 11/17/2020 11:18:29      T: 11/17/2020 11:54:38     [email or web address]  Job#: [id number]     Doc#: [id number]    CC:

## 2020-12-14 ENCOUNTER — OFFICE VISIT (OUTPATIENT)
Dept: SURGERY | Age: 50
End: 2020-12-14

## 2020-12-14 VITALS
HEIGHT: 66 IN | DIASTOLIC BLOOD PRESSURE: 70 MMHG | BODY MASS INDEX: 40.5 KG/M2 | WEIGHT: 252 LBS | SYSTOLIC BLOOD PRESSURE: 122 MMHG | HEART RATE: 84 BPM

## 2020-12-14 PROCEDURE — 99024 POSTOP FOLLOW-UP VISIT: CPT | Performed by: PHYSICIAN ASSISTANT

## 2021-01-01 NOTE — PROGRESS NOTES
Kalli Duron     :  1970      Chief Complaint   Patient presents with    Follow Up After Procedure        HPI:  Marilyn Blizzard  comes today in follow-up from a laparoscopic cholecystectomy with operative cholangiogram and umbilical hernia repair. He is doing well. Kalli Duron is a 52 y.o. male with the following history as recorded in [facility]:  Patient Active Problem List    Diagnosis Date Noted    Chronic cholecystitis with calculus     Umbilical hernia without obstruction and without gangrene 2020    Upper abdominal pain 10/06/2020    Nausea 10/06/2020    Change in bowel habits 2020    Diarrhea 2020    Dysphagia 2020    Chronic heartburn 2020    Pharyngoesophageal dysphagia 2020    Obstructive sleep apnea 2020    Restless leg syndrome 2020    Hypoxemia 2020    CPAP (continuous positive airway pressure) dependence 2020    Depression with anxiety 2020    Smoker 2020    Hyperlipidemia 2020    Chronic obstructive pulmonary disease ([facility] [address] 2020     Current Outpatient Medications   Medication Sig Dispense Refill    HYDROcodone-acetaminophen (NORCO) 5-325 MG per tablet Take 1 tablet by mouth every 6 hours as needed for Pain.  10 tablet 0    mupirocin (BACTROBAN) 2 % ointment Apply to each nostril BID 5 days prior to surgery 1 Tube 0    atorvastatin (LIPITOR) 40 MG tablet Take 1 tablet by mouth daily 90 tablet 3    omeprazole (PRILOSEC) 20 MG delayed release capsule Take 1 capsule by mouth 2 times daily (before meals) 60 capsule 3    cyclobenzaprine (FLEXERIL) 10 MG tablet Take 1 tablet by mouth 3 times daily as needed for Muscle spasms 90 tablet 1    buPROPion (WELLBUTRIN XL) 150 MG extended release tablet Take 1 tablet by mouth every morning 30 tablet 3    ARIPiprazole (ABILIFY) 5 MG tablet daily       lisinopril (PRINIVIL;ZESTRIL) 20 MG tablet daily  torsemide (DEMADEX) 20 MG tablet 20 mg 2 times daily       rOPINIRole (REQUIP) 0.5 MG tablet Take 1 q hs (Patient taking differently: Take 0.5 mg by mouth nightly Take 1 q hs) 30 tablet 3    blood glucose test strips (ONE TOUCH ULTRA TEST) strip USE TO TEST TWICE DAILY 100 strip 3    Lancets MISC 1 each by Does not apply route 2 times daily 100 each 5    glucose monitoring kit (FREESTYLE) monitoring kit 1 kit by Does not apply route daily 1 kit 0     No current facility-administered medications for this visit. Allergies: Patient has no known allergies. Past Medical History:   Diagnosis Date    CHF (congestive heart failure) ([facility] [address]     stage !     COPD [facility]ch[facility] obstructive [facility] disease) [facility]     COVID-19     CPAP (continuous positive airway pressure) dependence     10cm to 20cm    GERD (gastroesophageal reflux disease)     Hyperlipidemia     Hypertension     Obstructive sleep apnea     AHI: 84.1 night one and 67.3 night two, per HST, 1/2020    KEM (obstructive sleep apnea)      Past Surgical History:   Procedure Laterality Date    BACK SURGERY  2013    4 rods and 4 screws in [facility]    CHOLECYSTECTOMY, LAPAROSCOPIC N/A 11/17/2020    LAPAROSCOPIC CHOLECYSTECTOMY WITH CHOLANGIOGRAM WITH UMBILICAL HERNIA REPAIR performed by Nicko Zelaya MD at [address] [place] [address]  2008    [facility]- pt unsure of results or whom    COLONOSCOPY N/A 9/24/2020    Dr Michele Parr prep, iInternal hemorrhoids-Grade 1, repeat with better prepif still symptomatic    ESOPHAGEAL DILATATION  9/24/2020    Dr Kristy Moeller TONSILLECTOMY AND ADENOIDECTOMY      UPPER GASTROINTESTINAL ENDOSCOPY N/A 9/24/2020    Dr Logan Rodas exam, empiric dil with #54F lewis, NEG EoE, NEG celiac     Family History   Problem Relation Age of Onset    Diabetes Mother     High Blood Pressure Mother     Sleep Apnea Mother     Prostate Cancer Maternal Grandfather

## 2021-01-04 RX ORDER — ROPINIROLE 0.5 MG/1
TABLET, FILM COATED ORAL
Qty: 30 TABLET | Refills: 5 | Status: SHIPPED | OUTPATIENT
Start: 2021-01-04 | End: 2021-06-22 | Stop reason: SDUPTHER

## 2021-01-04 NOTE — TELEPHONE ENCOUNTER
Requested Prescriptions     Pending Prescriptions Disp Refills    rOPINIRole (REQUIP) 0.5 MG tablet 30 tablet 3     Sig: Take 1 q hs       Last Office Visit: 8/28/20  Next Office Visit: None   Last Medication Refill: 8/20/20 with 3 refills

## 2021-01-11 RX ORDER — ARIPIPRAZOLE 5 MG/1
5 TABLET ORAL DAILY
Qty: 30 TABLET | Refills: 3 | Status: SHIPPED | OUTPATIENT
Start: 2021-01-11 | End: 2021-04-27 | Stop reason: SDUPTHER

## 2021-02-17 ENCOUNTER — OFFICE VISIT (OUTPATIENT)
Dept: PRIMARY CARE CLINIC | Age: 51
End: 2021-02-17
Payer: MEDICAID

## 2021-02-17 ENCOUNTER — HOSPITAL ENCOUNTER (OUTPATIENT)
Dept: ULTRASOUND IMAGING | Age: 51
Discharge: HOME | End: 2021-02-17
Payer: MEDICAID

## 2021-02-17 VITALS
BODY MASS INDEX: 40.34 KG/M2 | OXYGEN SATURATION: 97 % | WEIGHT: 251 LBS | HEIGHT: 66 IN | RESPIRATION RATE: 16 BRPM | SYSTOLIC BLOOD PRESSURE: 110 MMHG | DIASTOLIC BLOOD PRESSURE: 72 MMHG | HEART RATE: 88 BPM | TEMPERATURE: 98.7 F

## 2021-02-17 DIAGNOSIS — N50.812 LEFT TESTICULAR PAIN: ICD-10-CM

## 2021-02-17 DIAGNOSIS — N50.812 LEFT TESTICULAR PAIN: Primary | ICD-10-CM

## 2021-02-17 PROCEDURE — 76870 US EXAM SCROTUM: CPT

## 2021-02-17 PROCEDURE — 99213 OFFICE O/P EST LOW 20 MIN: CPT | Performed by: NURSE PRACTITIONER

## 2021-02-17 PROCEDURE — G8427 DOCREV CUR MEDS BY ELIG CLIN: HCPCS | Performed by: NURSE PRACTITIONER

## 2021-02-17 PROCEDURE — 1036F TOBACCO NON-USER: CPT | Performed by: NURSE PRACTITIONER

## 2021-02-17 PROCEDURE — G8417 CALC BMI ABV UP PARAM F/U: HCPCS | Performed by: NURSE PRACTITIONER

## 2021-02-17 PROCEDURE — G8484 FLU IMMUNIZE NO ADMIN: HCPCS | Performed by: NURSE PRACTITIONER

## 2021-02-17 RX ORDER — BUPROPION HYDROCHLORIDE 150 MG/1
150 TABLET ORAL EVERY MORNING
Qty: 30 TABLET | Refills: 3 | Status: SHIPPED | OUTPATIENT
Start: 2021-02-17 | End: 2021-06-22 | Stop reason: SDUPTHER

## 2021-02-17 RX ORDER — ATORVASTATIN CALCIUM 40 MG/1
40 TABLET, FILM COATED ORAL DAILY
Qty: 90 TABLET | Refills: 3 | Status: SHIPPED | OUTPATIENT
Start: 2021-02-17 | End: 2022-03-07 | Stop reason: SDUPTHER

## 2021-02-17 RX ORDER — LEVOFLOXACIN 500 MG/1
500 TABLET, FILM COATED ORAL DAILY
Qty: 10 TABLET | Refills: 0 | Status: SHIPPED | OUTPATIENT
Start: 2021-02-17 | End: 2021-02-27

## 2021-02-17 NOTE — PROGRESS NOTES
Dr Domingo Whatley prep, iInternal hemorrhoids-Grade 1, repeat with better prepif still symptomatic    ESOPHAGEAL DILATATION  2020    Dr Gregory Linebon HERNIA REPAIR      TONSILLECTOMY AND ADENOIDECTOMY      UPPER GASTROINTESTINAL ENDOSCOPY N/A 2020    Dr Yina Faulkner exam, empiric dil with #54F saucedo, NEG EoE, NEG celiac       Vitals 2020/0384   SYSTOLIC 294 439 - - - -   DIASTOLIC 72 70 - - - -   Site Right Upper Arm - - - - -   Position Sitting - - - - -   Cuff Size Large Adult - - - - -   Pulse 88 84 - - - -   Temp 98.7 - - - - -   Resp 16 - 2 7 19 13   SpO2 97 - 86 92 93 91   Weight 251 lb 252 lb - - - -   Height 5' 6\" 5' 6\" - - - -   Body mass index 40.51 kg/m2 40.67 kg/m2 - - - -   Some recent data might be hidden       Family History   Problem Relation Age of Onset    Diabetes Mother     High Blood Pressure Mother     Sleep Apnea Mother     Prostate Cancer Maternal Grandfather     Colon Polyps Neg Hx     Esophageal Cancer Neg Hx     Stomach Cancer Neg Hx     Rectal Cancer Neg Hx     Liver Cancer Neg Hx     Colon Cancer Neg Hx        Social History     Tobacco Use    Smoking status: Former Smoker     Packs/day: 0.25     Years: 15.00     Pack years: 3.75     Types: Cigarettes     Quit date:      Years since quittin.1    Smokeless tobacco: Never Used   Substance Use Topics    Alcohol use: Not Currently      Current Outpatient Medications   Medication Sig Dispense Refill    ARIPiprazole (ABILIFY) 5 MG tablet Take 1 tablet by mouth daily 30 tablet 3    rOPINIRole (REQUIP) 0.5 MG tablet Take 1 q hs 30 tablet 5    atorvastatin (LIPITOR) 40 MG tablet Take 1 tablet by mouth daily 90 tablet 3    omeprazole (PRILOSEC) 20 MG delayed release capsule Take 1 capsule by mouth 2 times daily (before meals) 60 capsule 3  cyclobenzaprine (FLEXERIL) 10 MG tablet Take 1 tablet by mouth 3 times daily as needed for Muscle spasms 90 tablet 1    buPROPion (WELLBUTRIN XL) 150 MG extended release tablet Take 1 tablet by mouth every morning 30 tablet 3    lisinopril (PRINIVIL;ZESTRIL) 20 MG tablet daily       torsemide (DEMADEX) 20 MG tablet 20 mg 2 times daily       blood glucose test strips (ONE TOUCH ULTRA TEST) strip USE TO TEST TWICE DAILY 100 strip 3    Lancets MISC 1 each by Does not apply route 2 times daily 100 each 5    glucose monitoring kit (FREESTYLE) monitoring kit 1 kit by Does not apply route daily 1 kit 0     No current facility-administered medications for this visit. No Known Allergies    Health Maintenance   Topic Date Due    Hepatitis C screen  1970    Pneumococcal 0-64 years Vaccine (1 of 1 - PPSV23) 11/10/1978    HIV screen  11/10/1987    DTaP/Tdap/Td vaccine (1 - Tdap) 11/10/1991    A1C test (Diabetic or Prediabetic)  07/07/2021    Lipid screen  07/07/2021    Potassium monitoring  09/04/2021    Creatinine monitoring  09/04/2021    Colon cancer screen colonoscopy  09/24/2023    Flu vaccine  Completed    Hepatitis A vaccine  Aged Out    Hepatitis B vaccine  Aged Out    Hib vaccine  Aged Out    Meningococcal (ACWY) vaccine  Aged Out       Subjective:      Review of Systems   Constitutional: Negative. Genitourinary:        Testicular pain   Psychiatric/Behavioral: Negative. Objective:     Physical Exam  Abdominal:      Hernia: A hernia is present. Hernia is present in the left inguinal area (possible). Genitourinary:     Penis: Normal.       Testes:         Right: Mass, tenderness, swelling, testicular hydrocele or varicocele not present. Right testis is descended. Cremasteric reflex is present. Left: Tenderness and swelling present. Mass, testicular hydrocele or varicocele not present. Left testis is descended. Cremasteric reflex is present.        Epididymis: Right: Not inflamed or enlarged. No mass or tenderness. Left: Enlarged. Not inflamed. Tenderness present. No mass. Lymphadenopathy:      Lower Body: No left inguinal adenopathy. /72 (Site: Right Upper Arm, Position: Sitting, Cuff Size: Large Adult)   Pulse 88   Temp 98.7 °F (37.1 °C) (Temporal)   Resp 16   Ht 5' 6\" (1.676 m)   Wt 251 lb (113.9 kg)   SpO2 97%   BMI 40.51 kg/m²     Assessment:       Diagnosis Orders   1. Left testicular pain  US SCROTUM AND TESTICLES         Plan:   More than 50% of the time was spent counseling and coordinating care for a total time of 20min face to face. Patient given educational materials -see patient instructions. Discussed use, benefit, and side effects of prescribed medications. All patient questions answered. Pt voiced understanding. Reviewed health maintenance. Instructed to continue currentmedications, diet and exercise. Patient agreed with treatment plan. Follow up as directed. MEDICATIONS:  No orders of the defined types were placed in this encounter. ORDERS:  Orders Placed This Encounter   Procedures    US SCROTUM AND TESTICLES       Follow-up:  No follow-ups on file. PATIENT INSTRUCTIONS:  There are no Patient Instructions on file for this visit. Electronically signed by DOROTHY Tomas CNP on 2/17/2021 at 11:53 AM  Late entry the ultrasound did show a epididymal cyst.  I am going to go ahead and treat him with Levaquin. EMR Dragon/transcription disclaimer:  Much of thisencounter note is electronic transcription/translation of spoken language to printed texts. The electronic translation of spoken language may be erroneous, or at times, nonsensical words or phrases may be inadvertentlytranscribed.   Although I have reviewed the note for such errors, some may still exist.

## 2021-03-17 RX ORDER — OMEPRAZOLE 20 MG/1
20 CAPSULE, DELAYED RELEASE ORAL
Qty: 60 CAPSULE | Refills: 3 | Status: SHIPPED | OUTPATIENT
Start: 2021-03-17 | End: 2021-08-06 | Stop reason: SDUPTHER

## 2021-03-29 ENCOUNTER — TELEPHONE (OUTPATIENT)
Dept: NEUROSURGERY | Age: 51
End: 2021-03-29

## 2021-03-29 NOTE — TELEPHONE ENCOUNTER
[facility] New Patient Questionnaire    1. Diagnosis/Reason for Referral?    LUMBAR RADICULOPATHY    2. Who is completing questionnaire? Patient X Caregiver Family      3. Has the patient had any previous spinal/brain surgeries? YES        A. If yes, what is the name of the facility in which the surgery was performed  [facility], # [phone]. B. Procedure/Surgery performed? SCREWS IN BACK AT L5 S1     C. Who was the surgeon? DR. Bangura Said, VIVIANA PEREZ     D. When was the surgery? 2014     E. Did the patient improve after the surgery? NOT REALLY STILL HURTS        4. Is this a second opinion? If yes, Dr. Lina Mar would like to review patient first before making the appointment. 5. Have MRI Images been obtain within the last year? Yes  NoX      XR  CT     If yes, where was the imaging performed? If yes, what part of the body? Lumbar  Cervical  Thoracic  Brain     If yes, when was it obtained? MM/YY    Note: if the scan was performed at a facility other than [facility], the disc will need to be brought to the appointment or we need to reach out to obtain the disc. A. Was the patient instructed to provide the disc? Yes   No      8. Has the patient had a NCV/EMG within the last year? Yes X No     If yes, where was it performed and date? 3-19-21 Location:[facility]      9. Has the patient been to Physical Therapy? Yes X No     If yes, what location, how long attended, and last visit? Location: [facility]       Therapy Lasted: 1 WEEK   Date of Last Visit: 3-21      10. Has the patient been to Pain Management? Yes  NoX     If yes, what location and last visit     Location:   Last Visit:   Is it helping?

## 2021-03-30 DIAGNOSIS — G89.29 CHRONIC MIDLINE LOW BACK PAIN WITHOUT SCIATICA: Primary | ICD-10-CM

## 2021-03-30 DIAGNOSIS — M54.50 CHRONIC MIDLINE LOW BACK PAIN WITHOUT SCIATICA: Primary | ICD-10-CM

## 2021-04-07 ENCOUNTER — HOSPITAL ENCOUNTER (OUTPATIENT)
Dept: GENERAL RADIOLOGY | Age: 51
Discharge: HOME | End: 2021-04-07
Payer: MEDICAID

## 2021-04-07 ENCOUNTER — OFFICE VISIT (OUTPATIENT)
Dept: NEUROSURGERY | Age: 51
End: 2021-04-07
Payer: MEDICAID

## 2021-04-07 VITALS
OXYGEN SATURATION: 93 % | BODY MASS INDEX: 39.05 KG/M2 | WEIGHT: 243 LBS | HEIGHT: 66 IN | HEART RATE: 93 BPM | SYSTOLIC BLOOD PRESSURE: 121 MMHG | DIASTOLIC BLOOD PRESSURE: 84 MMHG

## 2021-04-07 DIAGNOSIS — Z98.1 HISTORY OF LUMBAR FUSION: ICD-10-CM

## 2021-04-07 DIAGNOSIS — R26.89 ANTALGIC GAIT: ICD-10-CM

## 2021-04-07 DIAGNOSIS — M54.42 CHRONIC MIDLINE LOW BACK PAIN WITH BILATERAL SCIATICA: Primary | ICD-10-CM

## 2021-04-07 DIAGNOSIS — G89.29 CHRONIC MIDLINE LOW BACK PAIN WITH BILATERAL SCIATICA: Primary | ICD-10-CM

## 2021-04-07 DIAGNOSIS — M79.604 BILATERAL LEG PAIN: ICD-10-CM

## 2021-04-07 DIAGNOSIS — M54.41 CHRONIC MIDLINE LOW BACK PAIN WITH BILATERAL SCIATICA: Primary | ICD-10-CM

## 2021-04-07 DIAGNOSIS — R29.2 HYPOREFLEXIA: ICD-10-CM

## 2021-04-07 DIAGNOSIS — M79.605 BILATERAL LEG PAIN: ICD-10-CM

## 2021-04-07 DIAGNOSIS — R20.0 BILATERAL LEG NUMBNESS: ICD-10-CM

## 2021-04-07 DIAGNOSIS — R20.8 BURNING SENSATION: ICD-10-CM

## 2021-04-07 DIAGNOSIS — M54.50 CHRONIC MIDLINE LOW BACK PAIN WITHOUT SCIATICA: ICD-10-CM

## 2021-04-07 DIAGNOSIS — G89.29 CHRONIC MIDLINE LOW BACK PAIN WITHOUT SCIATICA: ICD-10-CM

## 2021-04-07 PROCEDURE — G8427 DOCREV CUR MEDS BY ELIG CLIN: HCPCS | Performed by: NURSE PRACTITIONER

## 2021-04-07 PROCEDURE — 99203 OFFICE O/P NEW LOW 30 MIN: CPT | Performed by: NURSE PRACTITIONER

## 2021-04-07 PROCEDURE — 1036F TOBACCO NON-USER: CPT | Performed by: NURSE PRACTITIONER

## 2021-04-07 PROCEDURE — 72110 X-RAY EXAM L-2 SPINE 4/>VWS: CPT

## 2021-04-07 PROCEDURE — G8417 CALC BMI ABV UP PARAM F/U: HCPCS | Performed by: NURSE PRACTITIONER

## 2021-04-07 ASSESSMENT — ENCOUNTER SYMPTOMS
RESPIRATORY NEGATIVE: 1
BACK PAIN: 1
GASTROINTESTINAL NEGATIVE: 1
EYES NEGATIVE: 1

## 2021-04-07 NOTE — PROGRESS NOTES
Review of Systems   Constitutional: Negative. HENT: Negative. Eyes: Negative. Respiratory: Negative. Cardiovascular: Negative. Gastrointestinal: Negative. Genitourinary: Negative. Musculoskeletal: Positive for back pain and myalgias. Skin: Negative. Neurological: Positive for tingling and weakness. Endo/Heme/Allergies: Negative. Psychiatric/Behavioral: Negative.

## 2021-04-07 NOTE — PROGRESS NOTES
[facility]  Office Visit      Chief Complaint   Patient presents with    Referral - General    Leg Pain    Back Pain    Tingling    Extremity Weakness       HISTORY OF PRESENT ILLNESS:    Suzanne Dewitt is a 52 y.o. male with a history of previous L4-5 lumbar fusion per Dr. Era Werner on 10/29/2014 in [place]. Prior to surgery he complained of back pain only. Following surgery he states \"my back got a little bit better\". Today he presents with low back pain and BLE L>R. He states that his pain is worse  The pain does radiate into the bilateral anterior thighs, shins, and dorsum of feet. His pain is mostly located in the legs. The patient complains of numbness throughout the entire distribution. He has burning sensation, paresthesias of the bilateral shins and dorsum of feet. He states that he is falling frequently, and he reports 4 falls in one month. He states that he just stands up, the legs go weak and they \"give out\". He states he can walk about 10 minutes before his legs start hurting. He will [facility] scooter at [facility]. His pain is worsened when going from a seated to standing position. His pain is significantly worsened with walking. His pain is worsened when lying flat. Overall, indicative that the patient does not have a mechanical nature to their pain. He states that 50% of his pain is located in the back and 50% is leg pain. The patient has underwent a non-operative treatment course that has included:  NSAIDs (ibuprofen)  Tylenol  Muscle Relaxers (flexeril)  Opiates (in the past)  Oral Steroids (in the past)  Physical Therapy [facility] about 1 month ago, no help)  Epidural Steroid Injections (did not have good experience)      Of note he does not use tobacco and does not take blood thinning medications. Of note he states he is pre-diabetic. Past Medical History:   Diagnosis Date    CHF (congestive heart failure) ([address]     stage !     COPD TEST TWICE DAILY 100 strip 3    Lancets MISC 1 each by Does not apply route 2 times daily 100 each 5    glucose monitoring kit (FREESTYLE) monitoring kit 1 kit by Does not apply route daily 1 kit 0     No current facility-administered medications for this visit. Allergies:  Patient has no known allergies. Social History:   Social History     Tobacco Use   Smoking Status Former Smoker    Packs/day: 0.25    Years: 15.00    Pack years: 3.75    Types: Cigarettes    Quit date:     Years since quittin.2   Smokeless Tobacco Never Used     Social History     Substance and Sexual Activity   Alcohol Use Not Currently         Family History:   Family History   Problem Relation Age of Onset    Diabetes Mother     High Blood Pressure Mother     Sleep Apnea Mother     Prostate Cancer Maternal Grandfather     Colon Polyps Neg Hx     Esophageal Cancer Neg Hx     Stomach Cancer Neg Hx     Rectal Cancer Neg Hx     Liver Cancer Neg Hx     Colon Cancer Neg Hx        REVIEW OF SYSTEMS:  Constitutional: Negative. HENT: Negative. Eyes: Negative. Respiratory: Negative. Cardiovascular: Negative. Gastrointestinal: Negative. Genitourinary: Negative. Musculoskeletal: Positive for back pain and myalgias. Skin: Negative. Neurological: Positive for tingling and weakness. Endo/Heme/Allergies: Negative. Psychiatric/Behavioral: Negative. PHYSICAL EXAM:  Vitals:    21 0908   BP: 121/84   Pulse: 93   SpO2: 93%     Constitutional: appears well-developed and well-nourished.    Eyes  conjunctiva normal.  Pupils react to light  Ear, nose, throat - hearing intact to finger rub, No scars, masses, or lesions over external nose or ears, no atrophy oftongue  Neck- symmetric, no masses noted, no jugular vein distension  Respiration- chest wall appears symmetric, good expansion, normal effort without use of accessory muscles  Musculoskeletal  no significant wasting of muscles noted, no bony deformities, gait no gross ataxia  Extremities- no clubbing, cyanosis oredema  Skin  warm, dry, and intact. No rash, erythema, or pallor. Psychiatric  mood, affect, and behavior appear normal.     Neurologic Examination  Awake, Alert and oriented x 4  Normal speech pattern, following commands    Motor:  RIGHT:     iliopsoas 5/5    knee flexor 5/5    knee extension 5/5    EHL/dorsiflexion 5/5    plantar flexion 5/5    LEFT:     iliopsoas 5/5    knee flexor 5/5    knee extension 5/5    EHL/dorsiflexion 5/5    plantar flexion 5/5    No deficits to light touch or pinprick sensation  Reflexes are 1+ bilateral patellar, 2+ right achilles, absent left achilles  No myofacial tenderness to palpation  Normal Gait pattern      DATA and IMAGING:    Nursing/pcp notes, imaging, labs, and vitals reviewed. PT,OT and/or speech notes reviewed    Lab Results   Component Value Date    WBC 6.9 09/04/2020    HGB 14.1 09/04/2020    HCT 43.1 09/04/2020    MCV 91.1 09/04/2020     09/04/2020     Lab Results   Component Value Date     09/04/2020    K 4.3 09/04/2020    CL 97 (L) 09/04/2020    CO2 27 09/04/2020    BUN 12 09/04/2020    CREATININE 0.9 09/04/2020    GLUCOSE 105 09/04/2020    CALCIUM 9.3 09/04/2020    PROT 6.8 09/04/2020    LABALBU 4.5 09/04/2020    BILITOT 1.3 (H) 09/04/2020    ALKPHOS 66 09/04/2020    AST 23 09/04/2020    ALT 40 09/04/2020    LABGLOM >60 09/04/2020    GFRAA >59 09/04/2020   No results found for: INR, PROTIME      Narrative   EXAMINATION:  XR LUMBAR SPINE (MIN 4 VIEWS)  4/7/2021 9:02 AM   HISTORY: Chronic midline low back pain without sciatica. Prior lumbar   surgery. COMPARISON: No comparison study. TECHNIQUE: 4 views were obtained. FINDINGS: There has been prior interbody and posterior estimated   fusion at L4-5. There has been prior L4 laminectomy. There is minimal   narrowing of the L5-S1 disc. The other lumbar disc spaces appear   fairly well maintained.  There is no abnormal subluxation on the   flexion and extension views. There is facet arthropathy at L3-4. The   facet joints are difficult to assess at L4-5 and L5-S1. There may be a   lesser degree of degenerative change of these facets. There has been   prior cholecystectomy.       Impression   Postoperative and degenerative changes of the lumbar   spine, as described. Signed by Dr Janeth Davila on 4/7/2021 9:04 AM   I have personally reviewed these images and my interpretation is: There is evidence of previous posterior instrumented fusion at L4-5 with bilateral pediscle screws and rods. There is an interbody noted at this level. Difficult to determine if there is good bony fusion. There may be non-union. There does not appear to be any hardware malfunction or screw pullout. Does not appear to be any instability noted on flexion and extension. ASSESSMENT:    Rickey Woods is a 52 y.o. male with complaints of low back pain and BLE pain L>R. He has a history of previous fusion of L4-5 per Dr. Lizzy Nguyen for low back pain. ICD-10-CM    1. Chronic midline low back pain with bilateral sciatica  M54.41 MRI LUMBAR SPINE W WO CONTRAST    M54.42     G89.29    2. Bilateral leg pain  M79.604 MRI LUMBAR SPINE W WO CONTRAST    M79.605    3. Bilateral leg numbness  R20.0 MRI LUMBAR SPINE W WO CONTRAST   4. Burning sensation  R20.8 MRI LUMBAR SPINE W WO CONTRAST   5. Hyporeflexia  R29.2 MRI LUMBAR SPINE W WO CONTRAST   6. Antalgic gait  R26.89 MRI LUMBAR SPINE W WO CONTRAST   7.  History of lumbar fusion  Z98.1        PLAN:  -Obtain MRI lumbar spine   -Follow up after imaging         DOROTHY Martinez

## 2021-04-22 ENCOUNTER — HOSPITAL ENCOUNTER (OUTPATIENT)
Dept: MRI IMAGING | Age: 51
Discharge: HOME | End: 2021-04-22
Payer: MEDICAID

## 2021-04-22 DIAGNOSIS — M54.41 CHRONIC MIDLINE LOW BACK PAIN WITH BILATERAL SCIATICA: ICD-10-CM

## 2021-04-22 DIAGNOSIS — M79.605 BILATERAL LEG PAIN: ICD-10-CM

## 2021-04-22 DIAGNOSIS — R26.89 ANTALGIC GAIT: ICD-10-CM

## 2021-04-22 DIAGNOSIS — R20.8 BURNING SENSATION: ICD-10-CM

## 2021-04-22 DIAGNOSIS — M54.42 CHRONIC MIDLINE LOW BACK PAIN WITH BILATERAL SCIATICA: ICD-10-CM

## 2021-04-22 DIAGNOSIS — R29.2 HYPOREFLEXIA: ICD-10-CM

## 2021-04-22 DIAGNOSIS — R20.0 BILATERAL LEG NUMBNESS: ICD-10-CM

## 2021-04-22 DIAGNOSIS — M79.604 BILATERAL LEG PAIN: ICD-10-CM

## 2021-04-22 DIAGNOSIS — G89.29 CHRONIC MIDLINE LOW BACK PAIN WITH BILATERAL SCIATICA: ICD-10-CM

## 2021-04-22 PROCEDURE — 72158 MRI LUMBAR SPINE W/O & W/DYE: CPT

## 2021-04-22 PROCEDURE — A9577 INJ MULTIHANCE: HCPCS | Performed by: NURSE PRACTITIONER

## 2021-04-22 PROCEDURE — 6360000004 HC RX CONTRAST MEDICATION: Performed by: NURSE PRACTITIONER

## 2021-04-22 RX ADMIN — GADOBENATE DIMEGLUMINE 20 ML: 529 INJECTION, SOLUTION INTRAVENOUS at 08:24

## 2021-04-27 RX ORDER — ARIPIPRAZOLE 5 MG/1
5 TABLET ORAL DAILY
Qty: 30 TABLET | Refills: 3 | Status: SHIPPED | OUTPATIENT
Start: 2021-04-27 | End: 2021-11-26 | Stop reason: SDUPTHER

## 2021-05-06 ENCOUNTER — OFFICE VISIT (OUTPATIENT)
Dept: NEUROSURGERY | Age: 51
End: 2021-05-06
Payer: MEDICAID

## 2021-05-06 VITALS
SYSTOLIC BLOOD PRESSURE: 118 MMHG | OXYGEN SATURATION: 96 % | HEART RATE: 79 BPM | HEIGHT: 66 IN | DIASTOLIC BLOOD PRESSURE: 88 MMHG | BODY MASS INDEX: 39.05 KG/M2 | WEIGHT: 243 LBS

## 2021-05-06 DIAGNOSIS — G89.29 CHRONIC MIDLINE LOW BACK PAIN WITH BILATERAL SCIATICA: Primary | ICD-10-CM

## 2021-05-06 DIAGNOSIS — M79.605 BILATERAL LEG PAIN: ICD-10-CM

## 2021-05-06 DIAGNOSIS — M54.42 CHRONIC MIDLINE LOW BACK PAIN WITH BILATERAL SCIATICA: Primary | ICD-10-CM

## 2021-05-06 DIAGNOSIS — M54.41 CHRONIC MIDLINE LOW BACK PAIN WITH BILATERAL SCIATICA: Primary | ICD-10-CM

## 2021-05-06 DIAGNOSIS — M79.604 BILATERAL LEG PAIN: ICD-10-CM

## 2021-05-06 DIAGNOSIS — R20.0 BILATERAL LEG NUMBNESS: ICD-10-CM

## 2021-05-06 PROCEDURE — G8417 CALC BMI ABV UP PARAM F/U: HCPCS | Performed by: NEUROLOGICAL SURGERY

## 2021-05-06 PROCEDURE — 99214 OFFICE O/P EST MOD 30 MIN: CPT | Performed by: NEUROLOGICAL SURGERY

## 2021-05-06 PROCEDURE — G8427 DOCREV CUR MEDS BY ELIG CLIN: HCPCS | Performed by: NEUROLOGICAL SURGERY

## 2021-05-06 PROCEDURE — 1036F TOBACCO NON-USER: CPT | Performed by: NEUROLOGICAL SURGERY

## 2021-05-06 ASSESSMENT — ENCOUNTER SYMPTOMS
GASTROINTESTINAL NEGATIVE: 1
EYES NEGATIVE: 1
RESPIRATORY NEGATIVE: 1
BACK PAIN: 1

## 2021-05-06 NOTE — PROGRESS NOTES
[facility]  Office Visit      Chief Complaint   Patient presents with    Follow-up    Results    Back Pain    Leg Pain     5/06/2021: Mr. Paulina Morales returns to clinic today to review the MRI lumbar spine. His pain syndrome remains the same as below. HISTORY OF PRESENT ILLNESS:    Genoveva Champion is a 52 y.o. male with a history of previous L4-5 lumbar fusion per Dr. Lisbet Simon on 10/29/2014 in [place]. Prior to surgery he complained of back pain only. Following surgery he states \"my back got a little bit better\". Today he presents with low back pain and BLE L>R. He states that his pain is worse  The pain does radiate into the bilateral anterior thighs, shins, and dorsum of feet. His pain is mostly located in the legs. The patient complains of numbness throughout the entire distribution. He has burning sensation, paresthesias of the bilateral shins and dorsum of feet. He states that he is falling frequently, and he reports 4 falls in one month. He states that he just stands up, the legs go weak and they \"give out\". He states he can walk about 10 minutes before his legs start hurting. He will use a scooter at [facility]. His pain is worsened when going from a seated to standing position. His pain is significantly worsened with walking. His pain is worsened when lying flat. Overall, indicative that the patient does not have a mechanical nature to their pain. He states that 50% of his pain is located in the back and 50% is leg pain. The patient has underwent a non-operative treatment course that has included:  NSAIDs (ibuprofen)  Tylenol  Muscle Relaxers (flexeril)  Opiates (in the past)  Oral Steroids (in the past)  Physical Therapy [facility] about 1 month ago, no help)  Epidural Steroid Injections (did not have good experience)      Of note he does not use tobacco and does not take blood thinning medications. Of note he states he is pre-diabetic.                Past Medical History:   Diagnosis Date    CHF (congestive heart failure) ([facility] [address]     stage !     COPD [facility]ch[facility] obstructive [facility]     COVID-19     CPAP (continuous positive airway pressure) dependence     10cm to 20cm    GERD (gastroesophageal reflux disease)     Hyperlipidemia     Hypertension     Obstructive sleep apnea     AHI: 84.1 night one and 67.3 night two, per HST, 1/2020    KEM (obstructive sleep apnea)        Past Surgical History:   Procedure Laterality Date    BACK SURGERY  2013    4 rods and 4 screws in back-[facility]    CHOLECYSTECTOMY, LAPAROSCOPIC N/A 11/17/2020    LAPAROSCOPIC CHOLECYSTECTOMY WITH CHOLANGIOGRAM WITH UMBILICAL HERNIA REPAIR performed by Paige Bloch, MD at [facility]  2008    [facility]- pt unsure of results or whom    COLONOSCOPY N/A 9/24/2020    Dr Logan Jaeger prep, iInternal hemorrhoids-Grade 1, repeat with better prepif still symptomatic    ESOPHAGEAL DILATATION  9/24/2020    Dr Scar Beard HERNIA REPAIR      TONSILLECTOMY AND ADENOIDECTOMY      UPPER GASTROINTESTINAL ENDOSCOPY N/A 9/24/2020    Dr Maggy Swift exam, empiric dil with #54F lewis, NEG EoE, NEG celiac       Current Outpatient Medications   Medication Sig Dispense Refill    ARIPiprazole (ABILIFY) 5 MG tablet Take 1 tablet by mouth daily 30 tablet 3    omeprazole (PRILOSEC) 20 MG delayed release capsule Take 1 capsule by mouth 2 times daily (before meals) 60 capsule 3    buPROPion (WELLBUTRIN XL) 150 MG extended release tablet Take 1 tablet by mouth every morning 30 tablet 3    atorvastatin (LIPITOR) 40 MG tablet Take 1 tablet by mouth daily 90 tablet 3    rOPINIRole (REQUIP) 0.5 MG tablet Take 1 q hs 30 tablet 5    cyclobenzaprine (FLEXERIL) 10 MG tablet Take 1 tablet by mouth 3 times daily as needed for Muscle spasms 90 tablet 1    lisinopril (PRINIVIL;ZESTRIL) 20 MG tablet daily       torsemide (DEMADEX) 20 MG other lumbar disc spaces appear   fairly well maintained. There is no abnormal subluxation on the   flexion and extension views. There is facet arthropathy at L3-4. The   facet joints are difficult to assess at L4-5 and L5-S1. There may be a   lesser degree of degenerative change of these facets. There has been   prior cholecystectomy.       Impression   Postoperative and degenerative changes of the lumbar   spine, as described. Signed by Dr Margi Nunez on 4/7/2021 9:04 AM   I have personally reviewed these images and my interpretation is: There is evidence of previous posterior instrumented fusion at L4-5 with bilateral pediscle screws and rods. There is an interbody noted at this level. Difficult to determine if there is good bony fusion. There may be non-union. There does not appear to be any hardware malfunction or screw pullout. Does not appear to be any instability noted on flexion and extension. History: Chronic low back pain with lower extremity radiculopathy. Frequent falls   MRI lumbar spine: Multiplanar imaging lumbar spine is performed pre-   and post-IV contrast.   COMPARISON: Plain films 4/7/2021   FINDINGS: There is susceptibility artifact associated with the   hardware fusing the posterior L4 and L5 vertebra with an interposition   graft in the L4/5 disc space. The alignment to lumbar spine appears   appropriate. Reactive edema of the endplates adjacent to F6/9   considered. No compression deformity or suspicious focal bony mass. No   abnormal enhancement identified. Conus medullaris terminates at the L1 level. At T12-L1, there is a small right paracentral disc bulge resulting in   mild effacement of the right right lateral recess at this level with   overall mild canal stenosis. No neural foramen narrowing. At L1/2, no central canal stenosis or neural foramen narrowing. At L2/3, no canal stenosis or neural foramen narrowing.    At L3/4, there is mild facet osteoarthropathy with mild posterior   lateral endplate spurring and foraminal disc bulging. There is mild   central canal stenosis with mild bilateral neural foramen narrowing. At L4/5, laminectomy change suspected. Mild facet arthropathy. Streak   artifact from the regional hardware. No prominent central canal   stenosis and only borderline left neural foramen narrowing considered. At L5-S1, there is mild posterior disc bulging and facet   osteoarthropathy with only mild central canal stenosis. There is   moderate left and mild to moderate right neural foramen narrowing.       Impression   1. Susceptibility artifact associated with the hardware fusing the   posterior L4 and L5 vertebra with interposition graft in place. 2. No acute lumbar vertebral pathology. Degenerative changes as   described above with mild canal stenosis and moderate left and   mild-to-moderate right L5-S1 neural foramen narrowing. Please refer to   dictation above for detailed findings at each level. Signed by Dr Mary Medina on 4/22/2021 9:43 AM     I have personally reviewed the images and my interpretation is: There is evidence of previous lumbar instrumented fusion at L4-5  T12-L1 there is a small right disc herniation that does not result in any major neural element compression  L3-4 mild canal stenosis  At L4-5 there could be an osteophyte that results in compression of the right L5 nerve root. It is difficult to discern due to the artifact of the hardware. 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 No evidence of large fiber peripheral neuropathy       ASSESSMENT:    Levi Willams is a 52 y.o. male with complaints of low back pain and BLE pain L>R. He has a history of previous fusion of L4-5 per Dr. Allan Menjivar for low back pain. ICD-10-CM    1.  Chronic midline low back pain with bilateral sciatica  M54.41 [facility], DOROTHY Marie, Pain Medicine, [place]    M54.42     E43.00    2. Bilateral leg pain  M79.604 Frank Luciano APRN, Pain Medicine, [place]    D55.661    3. Bilateral leg numbness  R20.0 Frank Madison APRN, Pain Medicine, [place]       PLAN:  -We have discussed and reviewed the results of the MRI lumbar spine with Mr. Brianna Taveras at length. We explained that he does have some mild narrowing at various levels. That being said, some of his pain syndrome does not perfectly correlate. We feel that some of his complaints correlate with a neuropathy, despite the NCS interpretation.   We would definitely recommend holding off on a neurosurgical intervention at this time.    -Refer to [facility] Pain management  -Follow up as needed              Kamran Sharif, DO

## 2021-05-06 NOTE — PROGRESS NOTES
Review of Systems   Constitutional: Negative. HENT: Negative. Eyes: Negative. Respiratory: Negative. Cardiovascular: Negative. Gastrointestinal: Negative. Genitourinary: Negative. Musculoskeletal: Positive for back pain and myalgias. Skin: Negative. Neurological: Negative. Endo/Heme/Allergies: Negative. Psychiatric/Behavioral: Negative.

## 2021-06-14 ENCOUNTER — HOSPITAL ENCOUNTER (OUTPATIENT)
Dept: PAIN MANAGEMENT | Age: 51
Discharge: HOME | End: 2021-06-14
Payer: MEDICAID

## 2021-06-14 VITALS
HEIGHT: 66 IN | TEMPERATURE: 97 F | OXYGEN SATURATION: 95 % | SYSTOLIC BLOOD PRESSURE: 134 MMHG | WEIGHT: 244 LBS | BODY MASS INDEX: 39.21 KG/M2 | HEART RATE: 91 BPM | DIASTOLIC BLOOD PRESSURE: 82 MMHG

## 2021-06-14 DIAGNOSIS — M79.604 BILATERAL LOWER EXTREMITY PAIN: ICD-10-CM

## 2021-06-14 DIAGNOSIS — M79.605 BILATERAL LOWER EXTREMITY PAIN: ICD-10-CM

## 2021-06-14 PROCEDURE — 99215 OFFICE O/P EST HI 40 MIN: CPT

## 2021-06-14 PROCEDURE — 99213 OFFICE O/P EST LOW 20 MIN: CPT | Performed by: NURSE PRACTITIONER

## 2021-06-14 ASSESSMENT — PAIN DESCRIPTION - PAIN TYPE: TYPE: CHRONIC PAIN

## 2021-06-14 ASSESSMENT — PAIN DESCRIPTION - LOCATION: LOCATION: BACK

## 2021-06-14 ASSESSMENT — PAIN SCALES - GENERAL: PAINLEVEL_OUTOF10: 8

## 2021-06-14 NOTE — PROGRESS NOTES
Clinic Documentation      Education Provided:  [x] Review of David Mclean  [x] Agreement Review  [x] PEG Score Calculated [x] PHQ Score Calculated [x] ORT Score Calculated    [] Compliance Issues Discussed [] Cognitive Behavior Needs [x] Exercise [] Review of Test [] Financial Issues  [x] Tobacco/Alcohol Use Reviewed [x] Teaching [x] New Patient [] Picture Obtained    Physician Plan:  [] Outgoing Referral  [] Pharmacy Consult  [] Test Ordered [] Prescription Ordered/Changed   [] Obtained Test Results / Consult Notes        Complete if patient is withholding blood thinner for procedure     Blood Thinner Patient is currently taking:      [] Plavix (Hold for 7 days)  [] Aspirin (Hold for 5 days)     [] Pletal (Hold for 2 days)  [] Pradaxa (Hold for 3 days)    [] Effient (Hold for 7 days)  [] Xarelto (Hold for 2 days)    [] Eliquis (Hold for 2 days)  [] Brilinta (Hold for 7 days)    [] Coumadin (Hold for 5 days) - (INR needs to be drawn the day prior to procedure- INR < 2.0)    [] Aggrenox (Hold for 7 days)        [] Patient will stop medication on their own.    [] Blood Thinner Form Faxed for approval to hold.    Provider form faxed to:    Assessment Completed by:  Electronically signed by Thania Mullins on 6/14/2021 at 11:03 AM

## 2021-06-14 NOTE — H&P
[facility]    History and Physical    Patient Name: Jesusita Banuelos    MR #: [id number]    Account #: [de-identified]    : 1970    Age: 52 y.o. Sex: male    Date: 2021    PCP: DOROTHY De La O CNP         Referring Provider:    Chief Complaint:   Chief Complaint   Patient presents with    Back Pain       History of Present Illness:    Jesusita Banuelos is a 52 y.o. male who presents to the office with primary complaints of bilateral leg pain below his knees on both sides. He says that the pain started about 2 months ago and it has progressively gotten worse. He says that after about 10 minutes of walking, especially at [facility] he has to get a cart to ride in. After sitting for a while the pain improves. He explains that he does have tingling in his feet bilateral. He has tried OTC Tylenol with not improvement. He was started on a diuretic over 6 months ago for edema in his legs. At that time he had doppler studies at [facility] that were negative due to having varicose veins and edema in his legs. He has been to neurosurgery and has been sent here to pain management. He did have nerve conduction studies, but they do not correlate with his symptoms nor does his MRI of lumbar spine. He says that he has not pain in his back, just both legs below his knees. He more than likely needs repeat doppler studies of his legs before proceeding with any type of injections. He does have a history of lumbar fusion. Employment: Retired []   Disabled  []   Works []     Does Not Work [x]     Previous Injury:  Yes  []   No [x]     Previous Surgery: Yes [x]   No []    Previous Physical Therapy In the last 6 months? Yes  []    No [x]   Did Physical Therapy make thepain better or worse? Better []   Worse []  Unchanged []     MRI in the last two years? Yes [x]  No []  Results reviewed with patient? Yes []   No []    CT Scan in the last two years?    Yes  []   No [x]  Results reviewed with patient? Yes []   No []     X-ray in the last two years? Yes [x]   No  []  Results reviewed with patient? Yes  []  No []     Injections in the past?  Yes [x]   No []   Did the injections help relieve the pain? Yes []   No [] Had bad experience    Do you have Depression? Yes  []    No [x]  Thinking of harming yourself or others? Yes  []   No [x]    Past Medical Histoy  Past Medical History:   Diagnosis Date    [facility]F [facility]congestive heart failure) ([facility] [address]     stage !  COPD [facility]ch[facility] obstructive [facility] disease) [facility]     COVID-19     CPAP (continuous positive airway pressure) dependence     10cm to 20cm    GERD (gastroesophageal reflux disease)     Hyperlipidemia     Hypertension     Obstructive sleep apnea     AHI: 84.1 night one and 67.3 night two, per HST, 1/2020    KEM (obstructive sleep apnea)        Surgery History  Past Surgical History:   Procedure Laterality Date    BACK SURGERY  2013    4 rods and 4 screws [facility]    CHOLECYSTECTOMY, LAPAROSCOPIC N/A 11/17/2020    LAPAROSCOPIC CHOLECYSTECTOMY WITH CHOLANGIOGRAM WITH UMBILICAL HERNIA REPAIR performed by Chip Andrews MD at [address] [place] [address]  2008    [facility] 27- pt unsure of results or whom    COLONOSCOPY N/A 9/24/2020    Dr Hui Dickson prep, iInternal hemorrhoids-Grade 1, repeat with better prepif still symptomatic    ESOPHAGEAL DILATATION  9/24/2020    Dr Burt Boateng HERNIA REPAIR      TONSILLECTOMY AND ADENOIDECTOMY      UPPER GASTROINTESTINAL ENDOSCOPY N/A 9/24/2020    Dr Karen Rangel exam, empiric dil with #54F lewis, NEG EoE, NEG celiac        Allergies  Patient has no known allergies.      Current Medications  Current Outpatient Medications   Medication Sig Dispense Refill    ARIPiprazole (ABILIFY) 5 MG tablet Take 1 tablet by mouth daily 30 tablet 3    omeprazole (PRILOSEC) 20 MG delayed release capsule Take 1 capsule by mouth 2 times daily (before meals) 60 capsule 3    buPROPion (WELLBUTRIN XL) 150 MG extended release tablet Take 1 tablet by mouth every morning 30 tablet 3    atorvastatin (LIPITOR) 40 MG tablet Take 1 tablet by mouth daily 90 tablet 3    rOPINIRole (REQUIP) 0.5 MG tablet Take 1 q hs 30 tablet 5    cyclobenzaprine (FLEXERIL) 10 MG tablet Take 1 tablet by mouth 3 times daily as needed for Muscle spasms 90 tablet 1    torsemide (DEMADEX) 20 MG tablet 20 mg 2 times daily       Lancets MISC 1 each by Does not apply route 2 times daily 100 each 5    glucose monitoring kit (FREESTYLE) monitoring kit 1 kit by Does not apply route daily 1 kit 0    lisinopril (PRINIVIL;ZESTRIL) 20 MG tablet daily       blood glucose test strips (ONE TOUCH ULTRA TEST) strip USE TO TEST TWICE DAILY 100 strip 3     No current facility-administered medications for this encounter. Social History    Social History     Tobacco Use    Smoking status: Former Smoker     Packs/day: 0.25     Years: 15.00     Pack years: 3.75     Types: Cigarettes     Quit date:      Years since quittin.4    Smokeless tobacco: Never Used   Substance Use Topics    Alcohol use: Not Currently         Family History  family history includes Diabetes in his mother; High Blood Pressure in his mother; Prostate Cancer in his maternal grandfather; Sleep Apnea in his mother. Review of Systems:  Constitutional: denies fever, chills, fatigue, change in appetite, weight gain or weight loss  Head: Normocephalic  Skin: denies easy bruising, skin redness, skin rash, hives, sensitivity to sun exposure, tightness, nodules or bumps, hair loss, color changes in the hands or feet, or feeling of temperature change such as coldness  Eyes: denies pain, redness, loss of vision, double or blurred vision, eye drainage, or dryness.    ENT and Mouth: denies ringing in the ears, loss of hearing, nasal congestion, nasal discharge, no hoarseness, sore throat, or difficulty swallowing   Respiratory: denies chronic dry cough, coughing up blood, coughing up mucus, waking at night coughing or choking, or wheezing  Cardiovascular: denies chest pain, irregular heartbeats, palpitations, shortness of breath, or edema in legs  Gastrointestinal: denies, nausea, vomiting, heartburn, diarrhea, constipation  Genitourinary: denies difficult urination, pain or burning with urination, blood in the urine, or cloudy urine  Musculoskeletal: denies arm, buttock, thigh or calf cramps. Has pain in bilateral legs below knees. No muscle weakness. No joint swelling. Neurologic: headache, dizziness, fainting, loss of consciousness, no sensitivity, no memory loss. .    Endocrine: denies intolerance to hot or cold temperature, night sweats, flushing, fingernail changes, increased thirst, or hairloss   Hematologic/ Lymphatic: denies anemia, bleeding tendency or clotting tendency, bruising easily. Allergic/ Immunologic: denies rhinitis, asthma, skin sensitivity, or Latex allergy  Psychiatric: denies depression or thoughts of suicide, or voices in head. Current Pain Assessment:   Pain Assessment  Pain Assessment: 0-10  Pain Level: 8  Patient's Stated Pain Goal: 3  Pain Type: Chronic pain  Pain Location: Back    Clinical Progression: gradually improving  Effect of Pain on Daily Activities: limits activity  Patient's Stated Pain Goal: No pain  Pain Intervention(s): Medication (see eMar), Repositioning, Rest, Ice    Current PE    ORT Score: 3    PHQ-9 Score: 2    Physical Exam:    Vitals:    21 1107   BP: 134/82   Pulse: 91   Temp: 97 °F (36.1 °C)   TempSrc: Temporal   SpO2: 95%   Weight: 244 lb (110.7 kg)   Height: 5' 6\" (1.676 m)       Body mass index is 39.38 kg/m². General Appearance: No acute distress.  Appears to be well dressed  Skin Exam: Warm and dry, no jaundice, rashes or leasions  Head Exam: NCAT, PERRLA, EOMI, scalp normal  Eye Exam: PERRLA, EOMI, conjunctivae clear  Ear Exam: Normal external auricles. No drainage from ear canals  Nose Exam: Normal alignment. Turbinates clear. No drainage  Mouth Exam: Oral mucosa pink and moist. Gums pink. Throat Exam: Posterior pharynx pink in color with no edema  Neck Exam: Supple, trachea midline. No masses palpated. Respiratory Exam: Clear to ausculation in all lobes anterior and posterior. Cardiovascular Exam: Regular rate and rhythm, no gallops, no rubs or murmurs. No edema in extremities  Gastrointestinal Exam: Bowel sounds in all quadrants, soft, non-distended, non-tender with palpation, no guarding   Musculoskeletal Exam: No joint swelling or deformity. Slight edema in bilateral lower extremities. Back Exam: negative for pain  Hip Exam: Full rotation bilateral  Shoulder Exam: Full rotation bilateral  Knee Exam: Full flexion and extension bilateral  Extremities: No rash, cyanosis or bruising  Neurologic Exam: Gait and coordination normal, speech normal and clear  Reflexes: Normal brachialis, Negative Ross's bilateral. Normal Patellar bilateral,   CN EXAM: II-XII intact, face symmetrical, tongue symmetrical, the trapezius and sternocleidomastoid muscle appearance and strength symmetrical, normal achilles bilateral, ankle clonus negative bilateral  Strength: 5/5 RUE Bi's/Tri's, 5/5 LUE Bi's/Tri's, 5/5 RLE knee flex/ext, 5/5 RLE DF/PF, 5/5 LLE knee flex/ext, 5/5 LLE DF/PF  Sensation: Equal and intact to fine touch in all extremities  Mood and affect: Normal limits  Nurses note reviewed along with current vital signs    Active Problem(s)  Active Problems:    Bilateral lower extremity pain  Resolved Problems:    * No resolved hospital problems. *                                                                                                                                 PLAN:  1. Patient is to call the office with any questions or concerns that may arise prior to next appointment.    2. Doppler Vascular studies of bilateral lower extremities at [facility]  3. See back in 4-5 weeks      Urine Drug Screen Today:   Yes  []    No  [x]     Discussion:  Discussed exam findings and plan of care with patient. Patient agreed with the current plan of care at this time. All questions from the patient were answered by the provider. Activity:   Discussed exercise as beneficial to pain reduction, encouraged stretching exercise with a focus on torso strengthening. Education Provided:  Review of [facility] [x]  Agreement Review  [x]  Reviewed PHQ-9  [x]      Reviewed ORT [x]  Review of Test  [x]  Compliance Issues Discussed [x]   Cognitive Behavior Needs  []  Exercise  [x]  Financial Issues  []   Tobacco/Alcohol Use  []  Teaching  [x]   New Patient Picture Obtained  [x]      [] Benzodiazapine's and Narcotics:  Patient educated on the possible effects of combining Benzodiazapine's and Opioids. Explained \"Black Box Warnings\" such as; possible suppressed breathing, hypoxia, anoxia, depressed cognition, heart arrhythmia, coma and possible death. Patient verbalized understanding concerning possible effects. Controlled Substance Monitoring:   Attestation: The SHANNEN report for this patient was reviewed today. Discussed with patient possible medication side effects, risk of tolerance, dependence and alternative treatments. Discussed thegrowing epidemic in the [place] with the overprescribing and at times the abuse of narcotics. Discussed the detrimental effects of long term narcotic use. Patient encouraged to set daily goals of exercising and decreasing dailynarcotic intake. Discussed with the patient about the development of hyperalgesia with long term narcotic intake. EMR dragon/transcription disclaimer: Much of this encounter note is electronic transcription/translation of spoken language to printed tach. Electronic translation of spoken language may be erroneous, or at times, nonsensical words or phrases may be inadvertently transcribed.  Although, I have reviewed the note for such errors, some may still exist.    CC:  Eber Fitzpatrick, APRN - CNP    Thank you for this kind referral and allowing me to participate    in your patients care.     1 [facility], DOROTHY, 6/14/2021 at 12:11 PM

## 2021-06-22 RX ORDER — ROPINIROLE 0.5 MG/1
TABLET, FILM COATED ORAL
Qty: 30 TABLET | Refills: 5 | Status: SHIPPED | OUTPATIENT
Start: 2021-06-22 | End: 2021-08-27 | Stop reason: SDUPTHER

## 2021-06-22 RX ORDER — BUPROPION HYDROCHLORIDE 150 MG/1
150 TABLET ORAL EVERY MORNING
Qty: 30 TABLET | Refills: 3 | Status: SHIPPED | OUTPATIENT
Start: 2021-06-22 | End: 2021-10-14 | Stop reason: SDUPTHER

## 2021-06-23 ENCOUNTER — OFFICE VISIT (OUTPATIENT)
Dept: PRIMARY CARE CLINIC | Age: 51
End: 2021-06-23
Payer: MEDICAID

## 2021-06-23 VITALS
SYSTOLIC BLOOD PRESSURE: 100 MMHG | TEMPERATURE: 97.1 F | HEIGHT: 66 IN | BODY MASS INDEX: 39.21 KG/M2 | RESPIRATION RATE: 16 BRPM | OXYGEN SATURATION: 96 % | WEIGHT: 244 LBS | HEART RATE: 97 BPM | DIASTOLIC BLOOD PRESSURE: 63 MMHG

## 2021-06-23 DIAGNOSIS — H69.81 DYSFUNCTION OF RIGHT EUSTACHIAN TUBE: Primary | ICD-10-CM

## 2021-06-23 PROCEDURE — 99213 OFFICE O/P EST LOW 20 MIN: CPT | Performed by: NURSE PRACTITIONER

## 2021-06-23 PROCEDURE — 1036F TOBACCO NON-USER: CPT | Performed by: NURSE PRACTITIONER

## 2021-06-23 PROCEDURE — G8427 DOCREV CUR MEDS BY ELIG CLIN: HCPCS | Performed by: NURSE PRACTITIONER

## 2021-06-23 PROCEDURE — G8417 CALC BMI ABV UP PARAM F/U: HCPCS | Performed by: NURSE PRACTITIONER

## 2021-06-23 RX ORDER — FLUTICASONE PROPIONATE 50 MCG
2 SPRAY, SUSPENSION (ML) NASAL NIGHTLY
Qty: 1 BOTTLE | Refills: 0 | Status: SHIPPED | OUTPATIENT
Start: 2021-06-23 | End: 2022-03-07 | Stop reason: SDUPTHER

## 2021-06-23 ASSESSMENT — ENCOUNTER SYMPTOMS: FACIAL SWELLING: 0

## 2021-06-23 NOTE — PROGRESS NOTES
[facility] PHYSICIAN SERVICES  [facility]  [address]  [address] [address]  Dept: [phone]  Dept Fax: [phone]  Loc: [phone]    Laura Drake is a 52 y.o. male who presents today for his medical conditions/complaints as noted below. Laura Drake is c/o of Cerumen Impaction (patient presents today with c/o wax in right ear that he would like to have flushed out. Ears were flushed out bilaterally.  )        HPI:     HPI   Chief Complaint   Patient presents with    Cerumen Impaction     patient presents today with c/o wax in right ear that he would like to have flushed out. Ears were flushed out bilaterally. He has not been swimming. He has not had a fever. He feels like he has fluid behind his right eardrum. He has some pain in both ears but it feels like it is deep in his ears. He has not had any congestion and he is not on any nose spray  Past Medical History:   Diagnosis Date    CHF (congestive heart failure) ([address]     stage !     COPD (chronic obstructive pulmonary disease) (HCC)     COVID-19     CPAP (continuous positive airway pressure) dependence     10cm to 20cm    GERD (gastroesophageal reflux disease)     Hyperlipidemia     Hypertension     Obstructive sleep apnea     AHI: 84.1 night one and 67.3 night two, per HST, 1/2020    KEM (obstructive sleep apnea)       Past Surgical History:   Procedure Laterality Date    BACK SURGERY  2013    4 rods and 4 screws in [facility]    CHOLECYSTECTOMY, LAPAROSCOPIC N/A 11/17/2020    LAPAROSCOPIC CHOLECYSTECTOMY WITH CHOLANGIOGRAM WITH UMBILICAL HERNIA REPAIR performed by Curt Dawson MD at [address] [place] [address]  2008    [facility]- pt unsure of results or whom    COLONOSCOPY N/A 9/24/2020    Dr Tisha Harrison prep, iInternal hemorrhoids-Grade 1, repeat with better prepif still symptomatic    ESOPHAGEAL DILATATION  9/24/2020    Dr Destini Wallace HERNIA REPAIR      TONSILLECTOMY AND ADENOIDECTOMY      UPPER GASTROINTESTINAL ENDOSCOPY N/A 2020    Dr Malgorzata Aldridge exam, empiric dil with #54F saucedo, NEG EoE, NEG celiac       Vitals 2021    SYSTOLIC 760 [phone]   DIASTOLIC 63 82 88 84 72 70   Site - - - - Right Upper Arm -   Position - - - - Sitting -   Cuff Size - - - - Large Adult -   Pulse 97 91 79 93 88 84   Temp 97.1 97 - - 98.7 -   Resp 16 - - - 16 -   SpO2 96 95 96 93 97 -   Weight 244 lb 244 lb 243 lb 243 lb 251 lb 252 lb   Height 5' 6\" 5' 6\" 5' 6\" 5' 6\" 5' 6\" 5' 6\"   Body mass index 39.38 kg/m2 39.38 kg/m2 39.22 kg/m2 39.22 kg/m2 40.51 kg/m2 40.67 kg/m2   Pain Level - 8 - - - -   Some recent data might be hidden       Family History   Problem Relation Age of Onset    Diabetes Mother     High Blood Pressure Mother     Sleep Apnea Mother     Prostate Cancer Maternal Grandfather     Colon Polyps Neg Hx     Esophageal Cancer Neg Hx     Stomach Cancer Neg Hx     Rectal Cancer Neg Hx     Liver Cancer Neg Hx     Colon Cancer Neg Hx        Social History     Tobacco Use    Smoking status: Former Smoker     Packs/day: 0.25     Years: 15.00     Pack years: 3.75     Types: Cigarettes     Quit date:      Years since quittin.4    Smokeless tobacco: Never Used   Substance Use Topics    Alcohol use: Not Currently      Current Outpatient Medications on File Prior to Visit   Medication Sig Dispense Refill    buPROPion (WELLBUTRIN XL) 150 MG extended release tablet Take 1 tablet by mouth every morning 30 tablet 3    rOPINIRole (REQUIP) 0.5 MG tablet Take 1 q hs 30 tablet 5    ARIPiprazole (ABILIFY) 5 MG tablet Take 1 tablet by mouth daily 30 tablet 3    omeprazole (PRILOSEC) 20 MG delayed release capsule Take 1 capsule by mouth 2 times daily (before meals) 60 capsule 3    atorvastatin (LIPITOR) 40 MG tablet Take 1 tablet by mouth daily 90 tablet 3    cyclobenzaprine (FLEXERIL) 10 MG rate and regular rhythm. Heart sounds: Normal heart sounds. Pulmonary:      Effort: Pulmonary effort is normal.      Breath sounds: Normal breath sounds. Skin:     General: Skin is warm and dry. Capillary Refill: Capillary refill takes less than 2 seconds. Neurological:      General: No focal deficit present. Mental Status: He is alert and oriented to person, place, and time. Psychiatric:         Mood and Affect: Mood normal.         Behavior: Behavior normal.         Thought Content: Thought content normal.         Judgment: Judgment normal.       /63   Pulse 97   Temp 97.1 °F (36.2 °C) (Temporal)   Resp 16   Ht 5' 6\" (1.676 m)   Wt 244 lb (110.7 kg)   SpO2 96%   BMI 39.38 kg/m²     Assessment:       Diagnosis Orders   1. Dysfunction of right eustachian tube           Plan:   More than 50% of the time was spent counseling and coordinating care for a total time of 20min face to face. See the handout below on station tube dysfunction. Were going to try the Flonase see the plan below  If it is not improving I will send him to the ENT for hearing test and ENT referral  He is to use the eardrops prophylactically because of the redness today. Patient given educational materials -see patient instructions. Discussed use, benefit, and side effects of prescribed medications. All patient questions answered. Pt voiced understanding. Reviewed health maintenance. Instructed to continue currentmedications, diet and exercise. Patient agreed with treatment plan. Follow up as directed.    MEDICATIONS:  Orders Placed This Encounter   Medications    fluticasone (FLONASE) 50 MCG/ACT nasal spray     Si sprays by Each Nostril route nightly     Dispense:  1 Bottle     Refill:  0    neomycin-polymyxin-hydrocortisone (CORTISPORIN) 3.5-46343-1 otic solution     Sig: Place 4 drops into both ears 3 times daily for 5 days For redness in canal     Dispense:  1 Bottle     Refill:  0 ORDERS:  No orders of the defined types were placed in this encounter. Follow-up:  No follow-ups on file. PATIENT INSTRUCTIONS:  Patient Instructions       Patient Education        Eustachian Tube Problems: Care Instructions  Your Care Instructions     The eustachian (say \"you-STAY-shee-un\") tubes run between the inside of the ears and the throat. They keep air pressure stable in the ears. If your eustachian tubes become blocked, the air pressure in your ears changes. The fluids from a cold can clog eustachian tubes, causing pain in the ears. A quick change in air pressure can cause eustachian tubes to close up. This might happen when an airplane changes altitude or when a  goes up or down underwater. Eustachian tube problems often clear up on their own or after antibiotic treatment. If your tubes continue to be blocked, you may need surgery. Follow-up care is a key part of your treatment and safety. Be sure to make and go to all appointments, and call your doctor if you are having problems. It's also a good idea to know your test results and keep a list of the medicines you take. How can you care for yourself at home? · To ease ear pain, apply a warm washcloth or a heating pad set on low. There may be some drainage from the ear when the heat melts earwax. Put a cloth between the heat source and your skin. Do not use a heating pad with children. · If your doctor prescribed antibiotics, take them as directed. Do not stop taking them just because you feel better. You need to take the full course of antibiotics. · Your doctor may recommend over-the-counter medicine. Be safe with medicines. Oral or nasal decongestants may relieve ear pain. Avoid decongestants that are combined with antihistamines, which tend to cause more blockage. But if allergies seem to be the problem, your doctor may recommend a combination. Be careful with cough and cold medicines.  Don't give them to children younger than 6, because they don't work for children that age and can even be harmful. For children 6 and older, always follow all the instructions carefully. Make sure you know how much medicine to give and how long to use it. And use the dosing device if one is included. When should you call for help? Call your doctor now or seek immediate medical care if:    · You develop sudden, complete hearing loss.     · You have severe pain or feel dizzy.     · You have new or increasing pus or blood draining from your ear.     · You have redness, swelling, or pain around or behind the ear. Watch closely for changes in your health, and be sure to contact your doctor if:    · You do not get better after 2 weeks.     · You have any new symptoms, such as itching or a feeling of fullness in the ear. Where can you learn more? Go to https://Vinculum Solutionspepiceweb.Hometapper. org and sign in to your Flinto account. Enter Y822 in the MadRat Games box to learn more about \"Eustachian Tube Problems: Care Instructions. \"     If you do not have an account, please click on the \"Sign Up Now\" link. Current as of: December 2, 2020               Content Version: 12.9  © 4583-8368 Healthwise, [employer]. Care instructions adapted under license by [facility] ([facility]). If you have questions about a medical condition or this instruction, always ask your healthcare professional. [place] [address] any warranty or liability for your use of this information. Electronically signed by DOROTHY Jovel CNP on 6/23/2021 at 3:30 PM    EMR Dragon/transcription disclaimer:  Much of thisencounter note is electronic transcription/translation of spoken language to printed texts. The electronic translation of spoken language may be erroneous, or at times, nonsensical words or phrases may be inadvertentlytranscribed.   Although I have reviewed the note for such errors, some may still exist.

## 2021-06-23 NOTE — PATIENT INSTRUCTIONS
Patient Education        Eustachian Tube Problems: Care Instructions  Your Care Instructions     The eustachian (say \"you-STAY-shee-un\") tubes run between the inside of the ears and the throat. They keep air pressure stable in the ears. If your eustachian tubes become blocked, the air pressure in your ears changes. The fluids from a cold can clog eustachian tubes, causing pain in the ears. A quick change in air pressure can cause eustachian tubes to close up. This might happen when an airplane changes altitude or when a  goes up or down underwater. Eustachian tube problems often clear up on their own or after antibiotic treatment. If your tubes continue to be blocked, you may need surgery. Follow-up care is a key part of your treatment and safety. Be sure to make and go to all appointments, and call your doctor if you are having problems. It's also a good idea to know your test results and keep a list of the medicines you take. How can you care for yourself at home? · To ease ear pain, apply a warm washcloth or a heating pad set on low. There may be some drainage from the ear when the heat melts earwax. Put a cloth between the heat source and your skin. Do not use a heating pad with children. · If your doctor prescribed antibiotics, take them as directed. Do not stop taking them just because you feel better. You need to take the full course of antibiotics. · Your doctor may recommend over-the-counter medicine. Be safe with medicines. Oral or nasal decongestants may relieve ear pain. Avoid decongestants that are combined with antihistamines, which tend to cause more blockage. But if allergies seem to be the problem, your doctor may recommend a combination. Be careful with cough and cold medicines. Don't give them to children younger than 6, because they don't work for children that age and can even be harmful. For children 6 and older, always follow all the instructions carefully.  Make sure you know how much medicine to give and how long to use it. And use the dosing device if one is included. When should you call for help? Call your doctor now or seek immediate medical care if:    · You develop sudden, complete hearing loss.     · You have severe pain or feel dizzy.     · You have new or increasing pus or blood draining from your ear.     · You have redness, swelling, or pain around or behind the ear. Watch closely for changes in your health, and be sure to contact your doctor if:    · You do not get better after 2 weeks.     · You have any new symptoms, such as itching or a feeling of fullness in the ear. Where can you learn more? Go to https://MolecuLight.Appbistro. org and sign in to your [employer] account. Enter Y822 in the Fillm box to learn more about \"Eustachian Tube Problems: Care Instructions. \"     If you do not have an account, please click on the \"Sign Up Now\" link. Current as of: December 2, 2020               Content Version: 12.9  © 8846-5218 Healthwise, Incorporated. Care instructions adapted under license by [facility] ([facility]). If you have questions about a medical condition or this instruction, always ask your healthcare professional. [place] [address] any warranty or liability for your use of this information.

## 2021-06-28 ENCOUNTER — TELEPHONE (OUTPATIENT)
Dept: PAIN MANAGEMENT | Age: 51
End: 2021-06-28

## 2021-06-28 DIAGNOSIS — M79.605 BILATERAL LOWER EXTREMITY PAIN: Primary | ICD-10-CM

## 2021-06-28 DIAGNOSIS — M79.604 BILATERAL LOWER EXTREMITY PAIN: Primary | ICD-10-CM

## 2021-06-30 ENCOUNTER — HOSPITAL ENCOUNTER (OUTPATIENT)
Dept: NON INVASIVE DIAGNOSTICS | Age: 51
Discharge: HOME | End: 2021-06-30
Payer: MEDICAID

## 2021-06-30 DIAGNOSIS — M79.605 BILATERAL LOWER EXTREMITY PAIN: ICD-10-CM

## 2021-06-30 DIAGNOSIS — M79.604 BILATERAL LOWER EXTREMITY PAIN: ICD-10-CM

## 2021-06-30 PROCEDURE — 93923 UPR/LXTR ART STDY 3+ LVLS: CPT

## 2021-06-30 PROCEDURE — 93970 EXTREMITY STUDY: CPT

## 2021-07-13 ENCOUNTER — OFFICE VISIT (OUTPATIENT)
Dept: PRIMARY CARE CLINIC | Age: 51
End: 2021-07-13
Payer: MEDICAID

## 2021-07-13 VITALS
DIASTOLIC BLOOD PRESSURE: 84 MMHG | SYSTOLIC BLOOD PRESSURE: 126 MMHG | TEMPERATURE: 98.2 F | HEIGHT: 66 IN | OXYGEN SATURATION: 99 % | WEIGHT: 244 LBS | HEART RATE: 84 BPM | BODY MASS INDEX: 39.21 KG/M2

## 2021-07-13 DIAGNOSIS — L73.8 FOLLICULITIS BARBAE: Primary | ICD-10-CM

## 2021-07-13 PROCEDURE — 99213 OFFICE O/P EST LOW 20 MIN: CPT | Performed by: NURSE PRACTITIONER

## 2021-07-13 PROCEDURE — 1036F TOBACCO NON-USER: CPT | Performed by: NURSE PRACTITIONER

## 2021-07-13 PROCEDURE — G8427 DOCREV CUR MEDS BY ELIG CLIN: HCPCS | Performed by: NURSE PRACTITIONER

## 2021-07-13 PROCEDURE — G8417 CALC BMI ABV UP PARAM F/U: HCPCS | Performed by: NURSE PRACTITIONER

## 2021-07-13 ASSESSMENT — ENCOUNTER SYMPTOMS
ALLERGIC/IMMUNOLOGIC NEGATIVE: 1
EYES NEGATIVE: 1
RESPIRATORY NEGATIVE: 1

## 2021-07-13 NOTE — PROGRESS NOTES
[facility] PHYSICIAN SERVICES  [facility]  [address] [place]  [address] [place] [address]  Dept: [phone]  Dept Fax: [phone]  Loc: [phone]    Rush Favre is a 52 y.o. male who presents today for his medical conditions/complaints as noted below. Rush Favre is c/o of Rash (Pt complains of a rash on his face after shaving yesterday.)        HPI:     HPI   43-year-old male presents today for a rash. He states that it developed on his face after shaving yesterday. He states that he does not use any type of shaving cream or lotion. But dry shaves. He normally wears a beard so this is an unusual occurrence for him. Chief Complaint   Patient presents with    Rash     Pt complains of a rash on his face after shaving yesterday. Past Medical History:   Diagnosis Date    CHF (congestive heart failure) ([address]     stage !     COPD [facility]chronic obstructive pulmonary disease) ([facility]     COVID-19     CPAP (continuous positive airway pressure) dependence     10cm to 20cm    GERD (gastroesophageal reflux disease)     Hyperlipidemia     Hypertension     Obstructive sleep apnea     AHI: 84.1 night one and 67.3 night two, per HST, 1/2020    KEM (obstructive sleep apnea)       Past Surgical History:   Procedure Laterality Date    BACK SURGERY  2013    [facility]    CHOLECYSTECTOMY, LAPAROSCOPIC N/A 11/17/2020    LAPAROSCOPIC CHOLECYSTECTOMY WITH CHOLANGIOGRAM WITH UMBILICAL HERNIA REPAIR performed by Doroteo Alexis MD at [address] [place] [address]  2008    [facility] 27- pt unsure of results or whom    COLONOSCOPY N/A 9/24/2020    Dr Miguel Lua prep, iInternal hemorrhoids-Grade 1, repeat with better prepif still symptomatic    ESOPHAGEAL DILATATION  9/24/2020    Dr Martin Dias HERNIA REPAIR      TONSILLECTOMY AND ADENOIDECTOMY      UPPER GASTROINTESTINAL ENDOSCOPY N/A 9/24/2020    Dr Reuben Mercer exam, empiric dil with #54F saucedo, NEG EoE, NEG celiac       Vitals 2021   SYSTOLIC 547 121 [phone]   DIASTOLIC 84 63 82 88 84 72   Site Left Upper Arm - - - - Right Upper Arm   Position Sitting - - - - Sitting   Cuff Size Large Adult - - - - Large Adult   Pulse 84 97 91 79 93 88   Temp 98.2 97.1 97 - - 98.7   Resp - 16 - - - 16   SpO2 99 96 95 96 93 97   Weight 244 lb 244 lb 244 lb 243 lb 243 lb 251 lb   Height 5' 6\" 5' 6\" 5' 6\" 5' 6\" 5' 6\" 5' 6\"   Body mass index 39.38 kg/m2 39.38 kg/m2 39.38 kg/m2 39.22 kg/m2 39.22 kg/m2 40.51 kg/m2   Pain Level - - 8 - - -   Some recent data might be hidden       Family History   Problem Relation Age of Onset    Diabetes Mother     High Blood Pressure Mother     Sleep Apnea Mother     Prostate Cancer Maternal Grandfather     Colon Polyps Neg Hx     Esophageal Cancer Neg Hx     Stomach Cancer Neg Hx     Rectal Cancer Neg Hx     Liver Cancer Neg Hx     Colon Cancer Neg Hx        Social History     Tobacco Use    Smoking status: Former Smoker     Packs/day: 0.25     Years: 15.00     Pack years: 3.75     Types: Cigarettes     Quit date:      Years since quittin.5    Smokeless tobacco: Never Used   Substance Use Topics    Alcohol use: Not Currently      Current Outpatient Medications on File Prior to Visit   Medication Sig Dispense Refill    fluticasone (FLONASE) 50 MCG/ACT nasal spray 2 sprays by Each Nostril route nightly 1 Bottle 0    buPROPion (WELLBUTRIN XL) 150 MG extended release tablet Take 1 tablet by mouth every morning 30 tablet 3    rOPINIRole (REQUIP) 0.5 MG tablet Take 1 q hs 30 tablet 5    ARIPiprazole (ABILIFY) 5 MG tablet Take 1 tablet by mouth daily 30 tablet 3    omeprazole (PRILOSEC) 20 MG delayed release capsule Take 1 capsule by mouth 2 times daily (before meals) 60 capsule 3    atorvastatin (LIPITOR) 40 MG tablet Take 1 tablet by mouth daily 90 tablet 3    cyclobenzaprine (FLEXERIL) 10 MG tablet Take 1 tablet by mouth 3 times daily as needed for Muscle spasms 90 tablet 1    lisinopril (PRINIVIL;ZESTRIL) 20 MG tablet daily       torsemide (DEMADEX) 20 MG tablet 20 mg 2 times daily       blood glucose test strips (ONE TOUCH ULTRA TEST) strip USE TO TEST TWICE DAILY 100 strip 3    Lancets MISC 1 each by Does not apply route 2 times daily 100 each 5    glucose monitoring kit (FREESTYLE) monitoring kit 1 kit by Does not apply route daily 1 kit 0     No current facility-administered medications on file prior to visit. No Known Allergies    Health Maintenance   Topic Date Due    Hepatitis C screen  Never done    Pneumococcal 0-64 years Vaccine (1 of 2 - PPSV23) Never done    HIV screen  Never done    DTaP/Tdap/Td vaccine (1 - Tdap) Never done    A1C test (Diabetic or Prediabetic)  07/07/2021    Lipid screen  07/07/2021    Flu vaccine (1) 09/01/2021    Potassium monitoring  09/04/2021    Creatinine monitoring  09/04/2021    Colon cancer screen colonoscopy  09/24/2023    COVID-19 Vaccine  Completed    Hepatitis A vaccine  Aged Out    Hepatitis B vaccine  Aged Out    Hib vaccine  Aged Out    Meningococcal (ACWY) vaccine  Aged Out       Subjective:      Review of Systems   Constitutional: Negative for chills, fatigue and fever. HENT: Negative. Eyes: Negative. Respiratory: Negative. Cardiovascular: Negative. Endocrine: Negative. Genitourinary: Negative. Musculoskeletal: Negative. Skin: Positive for rash. Allergic/Immunologic: Negative. Neurological: Negative. Hematological: Negative. Psychiatric/Behavioral: Negative. Objective:     Physical Exam  Vitals and nursing note reviewed. Constitutional:       General: He is not in acute distress. Appearance: Normal appearance. He is not ill-appearing or toxic-appearing. HENT:      Head: Normocephalic and atraumatic.       Nose: Nose normal.      Mouth/Throat:      Mouth: Mucous membranes are moist. Pharynx: Oropharynx is clear. Eyes:      Extraocular Movements: Extraocular movements intact. Conjunctiva/sclera: Conjunctivae normal.      Pupils: Pupils are equal, round, and reactive to light. Cardiovascular:      Rate and Rhythm: Normal rate and regular rhythm. Pulses: Normal pulses. Heart sounds: Normal heart sounds. Pulmonary:      Effort: Pulmonary effort is normal. No respiratory distress. Breath sounds: Normal breath sounds. No wheezing or rales. Abdominal:      Palpations: Abdomen is soft. Musculoskeletal:         General: Normal range of motion. Cervical back: Normal range of motion and neck supple. Skin:     General: Skin is warm and dry. Findings: Rash present. Neurological:      Mental Status: He is alert and oriented to person, place, and time. Psychiatric:         Mood and Affect: Mood normal.         Behavior: Behavior normal.         Thought Content: Thought content normal.         Judgment: Judgment normal.       /84 (Site: Left Upper Arm, Position: Sitting, Cuff Size: Large Adult)   Pulse 84   Temp 98.2 °F (36.8 °C)   Ht 5' 6\" (1.676 m)   Wt 244 lb (110.7 kg)   SpO2 99%   BMI 39.38 kg/m²     Assessment:       Diagnosis Orders   1. Folliculitis barbae           Plan:   Wash face with Dial soap twice a day. Keep site clean. Apply mupirocin thin coat to sites twice a day. Monitor for signs of increasing infection such as systemic fever, large amounts of pus drainage or streaking red redness from site. Patient given educational materials -see patient instructions. Discussed use, benefit, and side effects of prescribed medications. All patient questions answered. Pt voiced understanding. Reviewed health maintenance. Instructed to continue currentmedications, diet and exercise. Patient agreed with treatment plan. Follow up as directed.    MEDICATIONS:  Orders Placed This Encounter   Medications    mupirocin (BACTROBAN) 2 %

## 2021-07-21 ENCOUNTER — HOSPITAL ENCOUNTER (OUTPATIENT)
Dept: PAIN MANAGEMENT | Age: 51
Discharge: HOME | End: 2021-07-21
Payer: MEDICAID

## 2021-07-21 VITALS
WEIGHT: 244 LBS | SYSTOLIC BLOOD PRESSURE: 146 MMHG | OXYGEN SATURATION: 100 % | HEART RATE: 75 BPM | DIASTOLIC BLOOD PRESSURE: 94 MMHG | BODY MASS INDEX: 39.21 KG/M2 | HEIGHT: 66 IN | TEMPERATURE: 97 F

## 2021-07-21 DIAGNOSIS — G62.9 NEUROPATHY: ICD-10-CM

## 2021-07-21 PROCEDURE — 99213 OFFICE O/P EST LOW 20 MIN: CPT | Performed by: NURSE PRACTITIONER

## 2021-07-21 PROCEDURE — 99213 OFFICE O/P EST LOW 20 MIN: CPT

## 2021-07-21 RX ORDER — GABAPENTIN 300 MG/1
300 CAPSULE ORAL 3 TIMES DAILY
Qty: 90 CAPSULE | Refills: 1 | Status: SHIPPED | OUTPATIENT
Start: 2021-08-04 | End: 2021-10-26 | Stop reason: SDUPTHER

## 2021-07-21 RX ORDER — GABAPENTIN 100 MG/1
CAPSULE ORAL
Qty: 126 CAPSULE | Refills: 0 | Status: SHIPPED | OUTPATIENT
Start: 2021-07-21 | End: 2021-10-06

## 2021-07-21 ASSESSMENT — PAIN DESCRIPTION - PAIN TYPE: TYPE: CHRONIC PAIN

## 2021-07-21 ASSESSMENT — PAIN SCALES - GENERAL: PAINLEVEL_OUTOF10: 7

## 2021-07-21 ASSESSMENT — PAIN DESCRIPTION - LOCATION: LOCATION: LEG

## 2021-07-21 NOTE — PROGRESS NOTES
[facility]    History and Physical    Patient Name: Gabriel Silvestre    MR #: [id number]    Account #: [de-identified]    : 1970    Age: 52 y.o. Sex: male    Date: 2021    PCP: DOROTHY Wong CNP         Referring Provider:    Chief Complaint:   Chief Complaint   Patient presents with    Leg Pain     bilateral       History of Present Illness:    Gabriel Silvestre is a 52 y.o. male who presents to the office with primary complaints of bilateral leg pain below his knees on both sides. Did have arterial and venous studies. Low perfusion in left calf above ankle. Continues to complain of the same pain in his legs below his knees. Not on anything for nerve pain. Employment: Retired []   Disabled  []   Works []     Does Not Work [x]     Previous Injury:  Yes  []   No [x]     Previous Surgery: Yes [x]   No []    Previous Physical Therapy In the last 6 months? Yes  []    No [x]   Did Physical Therapy make thepain better or worse? Better []   Worse []  Unchanged []     MRI in the last two years? Yes [x]  No []  Results reviewed with patient? Yes []   No []    CT Scan in the last two years? Yes  []   No [x]  Results reviewed with patient? Yes []   No []     X-ray in the last two years? Yes [x]   No  []  Results reviewed with patient? Yes  []  No []     Injections in the past?  Yes [x]   No []   Did the injections help relieve the pain? Yes []   No [] Had bad experience    Do you have Depression? Yes  []    No [x]  Thinking of harming yourself or others? Yes  []   No [x]    Past Medical Histoy  Past Medical History:   Diagnosis Date    CHF (congestive heart failure) ([facility] [address]     stage !     COPD (chronic obstructive pulmonary disease) ([facility]     COVID-19     CPAP (continuous positive airway pressure) dependence     10cm to 20cm    GERD (gastroesophageal reflux disease)     Hyperlipidemia     Hypertension     Obstructive sleep apnea AHI: 84.1 night one and 67.3 night two, per HST, 1/2020    KEM (obstructive sleep apnea)        Surgery History  Past Surgical History:   Procedure Laterality Date    BACK SURGERY  2013    4 rods and 4 screws [facility]    CHOLECYSTECTOMY, LAPAROSCOPIC N/A 11/17/2020    LAPAROSCOPIC CHOLECYSTECTOMY WITH CHOLANGIOGRAM WITH UMBILICAL HERNIA REPAIR performed by Edgardo Murphy MD at [address]  2008    [facility] 27- pt unsure of results or whom    COLONOSCOPY N/A 9/24/2020    Dr Concetta Hall prep, iInternal hemorrhoids-Grade 1, repeat with better prepif still symptomatic    ESOPHAGEAL DILATATION  9/24/2020    Dr Flavio Stewart HERNIA REPAIR      TONSILLECTOMY AND ADENOIDECTOMY      UPPER GASTROINTESTINAL ENDOSCOPY N/A 9/24/2020    Dr Bryson Arias exam, empiric dil with #54F lewis, NEG EoE, NEG celiac        Allergies  Patient has no known allergies. Current Medications  Current Outpatient Medications   Medication Sig Dispense Refill    gabapentin (NEURONTIN) 100 MG capsule 1 capsule 3 times a day for 7 days, 2 capsules 3 times a day for 7 days, 3 capsules 3 times a day for 7 days 126 capsule 0    [START ON 8/4/2021] gabapentin (NEURONTIN) 300 MG capsule Take 1 capsule by mouth 3 times daily for 60 days.  90 capsule 1    fluticasone (FLONASE) 50 MCG/ACT nasal spray 2 sprays by Each Nostril route nightly 1 Bottle 0    buPROPion (WELLBUTRIN XL) 150 MG extended release tablet Take 1 tablet by mouth every morning 30 tablet 3    rOPINIRole (REQUIP) 0.5 MG tablet Take 1 q hs 30 tablet 5    ARIPiprazole (ABILIFY) 5 MG tablet Take 1 tablet by mouth daily 30 tablet 3    omeprazole (PRILOSEC) 20 MG delayed release capsule Take 1 capsule by mouth 2 times daily (before meals) 60 capsule 3    atorvastatin (LIPITOR) 40 MG tablet Take 1 tablet by mouth daily 90 tablet 3    cyclobenzaprine (FLEXERIL) 10 MG tablet Take 1 tablet by mouth 3 times daily as needed for Muscle spasms 90 tablet 1    lisinopril (PRINIVIL;ZESTRIL) 20 MG tablet daily       torsemide (DEMADEX) 20 MG tablet 20 mg 2 times daily       blood glucose test strips (ONE TOUCH ULTRA TEST) strip USE TO TEST TWICE DAILY 100 strip 3    Lancets MISC 1 each by Does not apply route 2 times daily 100 each 5    glucose monitoring kit (FREESTYLE) monitoring kit 1 kit by Does not apply route daily 1 kit 0     No current facility-administered medications for this encounter. Social History    Social History     Tobacco Use    Smoking status: Former Smoker     Packs/day: 0.25     Years: 15.00     Pack years: 3.75     Types: Cigarettes     Quit date:      Years since quittin.5    Smokeless tobacco: Never Used   Substance Use Topics    Alcohol use: Not Currently         Family History  family history includes Diabetes in his mother; High Blood Pressure in his mother; Prostate Cancer in his maternal grandfather; Sleep Apnea in his mother. Review of Systems:  Constitutional: denies fever, chills, fatigue, change in appetite, weight gain or weight loss  Head: Normocephalic  Skin: denies easy bruising, skin redness, skin rash, hives, sensitivity to sun exposure, tightness, nodules or bumps, hair loss, color changes in the hands or feet, or feeling of temperature change such as coldness  Eyes: denies pain, redness, loss of vision, double or blurred vision, eye drainage, or dryness.    ENT and Mouth: denies ringing in the ears, loss of hearing, nasal congestion, nasal discharge, no hoarseness, sore throat, or difficulty swallowing   Respiratory: denies chronic dry cough, coughing up blood, coughing up mucus, waking at night coughing or choking, or wheezing  Cardiovascular: denies chest pain, irregular heartbeats, palpitations, shortness of breath, or edema in legs  Gastrointestinal: denies, nausea, vomiting, heartburn, diarrhea, constipation  Genitourinary: denies difficult urination, pain or burning with urination, blood in the urine, or cloudy urine  Musculoskeletal: denies arm, buttock, thigh or calf cramps. Has pain in bilateral legs below knees. No muscle weakness. No joint swelling. Neurologic: headache, dizziness, fainting, loss of consciousness, no sensitivity, no memory loss. .    Endocrine: denies intolerance to hot or cold temperature, night sweats, flushing, fingernail changes, increased thirst, or hairloss   Hematologic/ Lymphatic: denies anemia, bleeding tendency or clotting tendency, bruising easily. Allergic/ Immunologic: denies rhinitis, asthma, skin sensitivity, or Latex allergy  Psychiatric: denies depression or thoughts of suicide, or voices in head. Current Pain Assessment:   Pain Assessment  Pain Assessment: 0-10  Pain Level: 7  Patient's Stated Pain Goal: 4  Pain Type: Chronic pain  Pain Location: Leg    Clinical Progression: gradually improving  Effect of Pain on Daily Activities: limits activity  Patient's Stated Pain Goal: No pain  Pain Intervention(s): Medication (see eMar), Repositioning, Rest, Ice    Current PE    ORT Score: 3    PHQ-9 Score: 2    Physical Exam:    Vitals:    21 0942   BP: (!) 146/94   Pulse: 75   Temp: 97 °F (36.1 °C)   TempSrc: Temporal   SpO2: 100%   Weight: 244 lb (110.7 kg)   Height: 5' 6\" (1.676 m)       Body mass index is 39.38 kg/m². General Appearance: No acute distress. Appears to be well dressed  Skin Exam: Warm and dry, no jaundice, rashes or leasions  Head Exam: NCAT, PERRLA, EOMI, scalp normal  Eye Exam: PERRLA, EOMI, conjunctivae clear  Ear Exam: Normal external auricles. No drainage from ear canals  Nose Exam: Normal alignment. Turbinates clear. No drainage  Mouth Exam: Oral mucosa pink and moist. Gums pink. Throat Exam: Posterior pharynx pink in color with no edema  Neck Exam: Supple, trachea midline. No masses palpated. Respiratory Exam: Clear to ausculation in all lobes anterior and posterior.    Cardiovascular Exam: Regular rate and rhythm, no gallops, no rubs or murmurs. No edema in extremities  Gastrointestinal Exam: Bowel sounds in all quadrants, soft, non-distended, non-tender with palpation, no guarding   Musculoskeletal Exam: No joint swelling or deformity. Slight edema in bilateral lower extremities. Back Exam: negative for pain  Hip Exam: Full rotation bilateral  Shoulder Exam: Full rotation bilateral  Knee Exam: Full flexion and extension bilateral  Extremities: No rash, cyanosis or bruising  Neurologic Exam: Gait and coordination normal, speech normal and clear  Reflexes: Normal brachialis, Negative Ross's bilateral. Normal Patellar bilateral,   CN EXAM: II-XII intact, face symmetrical, tongue symmetrical, the trapezius and sternocleidomastoid muscle appearance and strength symmetrical, normal achilles bilateral, ankle clonus negative bilateral  Strength: 5/5 RUE Bi's/Tri's, 5/5 LUE Bi's/Tri's, 5/5 RLE knee flex/ext, 5/5 RLE DF/PF, 5/5 LLE knee flex/ext, 5/5 LLE DF/PF  Sensation: Equal and intact to fine touch in all extremities  Mood and affect: Normal limits  Nurses note reviewed along with current vital signs    Active Problem(s)  Active Problems:    Bilateral lower extremity pain    Neuropathy  Resolved Problems:    * No resolved hospital problems. *                                                                                                                                 PLAN:  1. Patient is to call the office with any questions or concerns that may arise prior to next appointment. 2. Gabapentin titration  3. Follow-up in 3 months    F/U with PCP concerning venous studies      Urine Drug Screen Today:   Yes  []    No  [x]   Discussion:  Discussed exam findings and plan of care with patient. Patient agreed with the current plan of care at this time. All questions from the patient were answered by the provider.     Activity:   Discussed exercise as beneficial to pain reduction, encouraged stretching exercise with a focus on torso strengthening. Education Provided:  Review of Tae Chang [x]  Agreement Review  [x]  Reviewed PHQ-9  [x]      Reviewed ORT [x]  Review of Test  [x]  Compliance Issues Discussed [x]   Cognitive Behavior Needs  []  Exercise  [x]  Financial Issues  []   Tobacco/Alcohol Use  []  Teaching  [x]   New Patient Picture Obtained  [x]      [] Benzodiazapine's and Narcotics:  Patient educated on the possible effects of combining Benzodiazapine's and Opioids. Explained \"Black Box Warnings\" such as; possible suppressed breathing, hypoxia, anoxia, depressed cognition, heart arrhythmia, coma and possible death. Patient verbalized understanding concerning possible effects. Controlled Substance Monitoring:   Attestation: The SHANNEN report for this patient was reviewed today. Discussed with patient possible medication side effects, risk of tolerance, dependence and alternative treatments. Discussed thegrowing epidemic in the [place] with the overprescribing and at times the abuse of narcotics. Discussed the detrimental effects of long term narcotic use. Patient encouraged to set daily goals of exercising and decreasing dailynarcotic intake. Discussed with the patient about the development of hyperalgesia with long term narcotic intake. EMR dragon/transcription disclaimer: Much of this encounter note is electronic transcription/translation of spoken language to printed tach. Electronic translation of spoken language may be erroneous, or at times, nonsensical words or phrases may be inadvertently transcribed. Although, I have reviewed the note for such errors, some may still exist.    CC:  Yoanna Saravia, APRN - CNP    Thank you for this kind referral and allowing me to participate    in your patients care.     [facility], DOROTHY, 7/21/2021 at 10:14 AM

## 2021-07-21 NOTE — PROGRESS NOTES
Clinic Documentation      Education Provided:  [x] Review of Josselin Kiran  [] Agreement Review  [x] PEG Score Calculated [] PHQ Score Calculated [] ORT Score Calculated    [] Compliance Issues Discussed [] Cognitive Behavior Needs [x] Exercise [] Review of Test [] Financial Issues  [x] Tobacco/Alcohol Use Reviewed [x] Teaching [] New Patient [] Picture Obtained    Physician Plan:  [] Outgoing Referral  [] Pharmacy Consult  [] Test Ordered [x] Prescription Ordered/Changed   [] Obtained Test Results / Consult Notes        Complete if patient is withholding blood thinner for procedure     Blood Thinner Patient is currently taking:      [] Plavix (Hold for 7 days)  [] Aspirin (Hold for 5 days)     [] Pletal (Hold for 2 days)  [] Pradaxa (Hold for 3 days)    [] Effient (Hold for 7 days)  [] Xarelto (Hold for 2 days)    [] Eliquis (Hold for 2 days)  [] Brilinta (Hold for 7 days)    [] Coumadin (Hold for 5 days) - (INR needs to be drawn the day prior to procedure- INR < 2.0)    [] Aggrenox (Hold for 7 days)        [] Patient will stop medication on their own.    [] Blood Thinner Form Faxed for approval to hold.    Provider form faxed to:     Assessment Completed by:  Electronically signed by Mitchell Acevedo on 7/21/2021 at 10:09 AM

## 2021-08-06 RX ORDER — OMEPRAZOLE 20 MG/1
20 CAPSULE, DELAYED RELEASE ORAL
Qty: 60 CAPSULE | Refills: 3 | Status: SHIPPED | OUTPATIENT
Start: 2021-08-06 | End: 2021-11-29 | Stop reason: SDUPTHER

## 2021-08-24 ENCOUNTER — PATIENT MESSAGE (OUTPATIENT)
Dept: PRIMARY CARE CLINIC | Age: 51
End: 2021-08-24

## 2021-08-24 NOTE — TELEPHONE ENCOUNTER
From: Morgan Carreon  To: Jocelin Mavis, APRN - CNP  Sent: 8/24/2021 9:14 AM CDT  Subject: Non-Urgent Medical Question    So I have a issue that I was told to talk to you about. I required an ARC in school and was in an enclosed classroom due to my learning disabilities. I quit school when I was 14 on [place]. I do not have a GED or a high diploma. I have recently starting to take GED classes in [address] [facility]. When we contacted the [facility] who would have my transcripts that told me that they did not keep records that are over 21years old. So even though there are classmates there at the [employer] school who are teachers now that know this they can not give me a letter stating this because they do no know my IQ or what I required back then. I talked with the  and she told me to talk with you to see if you were willing to write a letter stating that I have learning disabilities and require extra help. This is a way for me to get accommodations for me when I finely am threw studying weekly for my GED. If you can't do that then how do I go about getting referred for a full scale IQ test done. I got a job as a  for  kids for [employer] for 4 hours a day as long as I am working on my GED or obtain one. Can you please advise me/us as to which direction to go?     Thank you  Yun Freeman and Beckie Padilla

## 2021-08-24 NOTE — LETTER
[place] [address] [address] [address]  Phone: [phone]  Fax: DOROTHY Tellez CNP        September 3, 2021    To Whom It May Concern,      Tutu Pabon is under my medical care. He has been under my care since 2019. He has asked me to write this letter for him regarding his GED. Unfortunately, he quit school as a teenager and did not finish high school. Mr. Aleks Maharaj in my opinion does have a low IQ and learning disability. I believe he would benefit from extra help and accommodation in preparing to take the GED exam.     If you have any questions I would be happy to discuss with you.       Sincerely,        Miles Claude, APRN - CNP

## 2021-08-27 ENCOUNTER — TELEPHONE (OUTPATIENT)
Dept: NEUROLOGY | Age: 51
End: 2021-08-27

## 2021-08-27 RX ORDER — ROPINIROLE 0.5 MG/1
TABLET, FILM COATED ORAL
Qty: 30 TABLET | Refills: 5 | Status: SHIPPED | OUTPATIENT
Start: 2021-08-27 | End: 2022-08-10 | Stop reason: SDUPTHER

## 2021-08-27 NOTE — TELEPHONE ENCOUNTER
Marilyn Blizzard is calling for a medication refill that is not listed in his chart. He stated he was needing a refill on the medication that goes in his cpap machine    Last office visit: Visit date not found  Next office visit: Visit date not found   Preferred Pharmacy:    Please call patient to clarify. Thank You.

## 2021-08-27 NOTE — TELEPHONE ENCOUNTER
Called and spoke with patient and he was needing an order for CPAP supplies he is a patient of [facility]. Request for supplies has been sent to provider.

## 2021-08-30 NOTE — TELEPHONE ENCOUNTER
Patient has been called and told order was sent and that he may need an appointment before they will send him supplies. Patient will call back if appointment is needed.

## 2021-09-01 NOTE — ADDENDUM NOTE
Addended byKely Ingram on: 2/17/2021 01:58 PM     Modules accepted: Orders Karyna Mccabe is a 69year old female.     HPI:     HPI     Consult      Additional comments: Consult per Dr Dalia Barber              Diabetic Eye Exam      Additional comments: Pt has been a diabetic for 4 years       Pt's diabetes is currently controlled by p standard drinks      Types: 1 Shots of liquor per week      Comment: \"socially\"    Drug use: No      Medications:  Current Outpatient Medications   Medication Sig Dispense Refill   • Montelukast Sodium 10 MG Oral Tab Take 1 tablet (10 mg total) by mouth Glucose Blood (ONETOUCH ULTRA BLUE) In Vitro Strip place 1 by Misc. (Non-Drug; Combo Route) route 3 times every day with glucose monitor     • Multiple Vitamins-Minerals (CENTRUM SILVER) Oral Tab Take  by mouth.  Take one tablet by mouth daily         Allerg Refraction    Patient is happy with her OTC reading glasses                  ASSESSMENT/PLAN:     Diagnoses and Plan:     Type 2 diabetes mellitus without retinopathy ([facility] [address]  Diabetes type II: no background of retinopathy, no signs of neovascularization not

## 2021-10-06 ENCOUNTER — TELEMEDICINE (OUTPATIENT)
Dept: PRIMARY CARE CLINIC | Age: 51
End: 2021-10-06
Payer: MEDICAID

## 2021-10-06 DIAGNOSIS — F81.9 MENTAL DEVELOPMENTAL DELAY: ICD-10-CM

## 2021-10-06 DIAGNOSIS — F41.8 DEPRESSION WITH ANXIETY: Primary | ICD-10-CM

## 2021-10-06 PROCEDURE — 99213 OFFICE O/P EST LOW 20 MIN: CPT | Performed by: NURSE PRACTITIONER

## 2021-10-06 PROCEDURE — G8427 DOCREV CUR MEDS BY ELIG CLIN: HCPCS | Performed by: NURSE PRACTITIONER

## 2021-10-06 NOTE — PROGRESS NOTES
[address] [facility], Jaswinder Salas  Phone:  [phone]      10/6/2021     TELEHEALTH EVALUATION -- Audio/Visual (During REXIB-84 public health emergency)    HPI: The patient is in his home with his wife and I am in the office. They consented for this video visit. He is trying to get his GED and needs forms completed including an IQ test for accommodations to have additional help with the GED. I had written him a letter stating that he had some mental and developmental disabilities. The letter was not satisfactory they are wanting a more detailed response from someone using a DSM code and a IQ test.    Shai Franklin (:  1970) has requested an audio/video evaluation for the following concern(s):    Forms for ged    Review of Systems   Constitutional: Negative. Psychiatric/Behavioral: Positive for dysphoric mood. The patient is nervous/anxious. Prior to Visit Medications    Medication Sig Taking? Authorizing Provider   rOPINIRole (REQUIP) 0.5 MG tablet Take 1 q hs Yes VIVIANA Martinez   omeprazole (PRILOSEC) 20 MG delayed release capsule Take 1 capsule by mouth 2 times daily (before meals) Yes DOROTHY Hurst CNP   gabapentin (NEURONTIN) 300 MG capsule Take 1 capsule by mouth 3 times daily for 60 days.  Yes DOROTHY Baker   fluticasone (FLONASE) 50 MCG/ACT nasal spray 2 sprays by Each Nostril route nightly Yes DOROTHY Hurst CNP   buPROPion (WELLBUTRIN XL) 150 MG extended release tablet Take 1 tablet by mouth every morning Yes DOROTHY Hurst CNP   ARIPiprazole (ABILIFY) 5 MG tablet Take 1 tablet by mouth daily Yes DOROTHY Hurst CNP   atorvastatin (LIPITOR) 40 MG tablet Take 1 tablet by mouth daily Yes DOROTHY Hurst CNP   cyclobenzaprine (FLEXERIL) 10 MG tablet Take 1 tablet by mouth 3 times daily as needed for Muscle spasms Yes DOROTHY Hurst CNP   lisinopril (PRINIVIL;ZESTRIL) 20 MG tablet daily  Yes Historical Provider, MD   torsemide (DEMADEX) 20 MG tablet 20 mg 2 times daily  Yes Historical Provider, MD   blood glucose test strips (ONE TOUCH ULTRA TEST) strip USE TO TEST TWICE DAILY Yes DOROTHY Meier CNP   Lancets MISC 1 each by Does not apply route 2 times daily Yes DOROTHY Meier CNP   glucose monitoring kit (FREESTYLE) monitoring kit 1 kit by Does not apply route daily Yes DOROTHY Meier CNP       Social History     Tobacco Use    Smoking status: Former Smoker     Packs/day: 0.25     Years: 15.00     Pack years: 3.75     Types: Cigarettes     Quit date:      Years since quittin.7    Smokeless tobacco: Never Used   Vaping Use    Vaping Use: Never used   Substance Use Topics    Alcohol use: Not Currently    Drug use: Not Currently        No Known Allergies,   Past Medical History:   Diagnosis Date    CHF (congestive heart failure) ([facility] [address]     stage !     COPD (chronic obstructive pulmonary disease) ([facility]     COVID-19     CPAP (continuous positive airway pressure) dependence     10cm to 20cm    GERD (gastroesophageal reflux disease)     Hyperlipidemia     Hypertension     Obstructive sleep apnea     AHI: 84.1 night one and 67.3 night two, per HST, 2020    KEM (obstructive sleep apnea)    ,   Past Surgical History:   Procedure Laterality Date    BACK SURGERY      4 [facility] and 4 screws in [facility] brain and spine    CHOLECYSTECTOMY, LAPAROSCOPIC N/A 2020    LAPAROSCOPIC CHOLECYSTECTOMY WITH CHOLANGIOGRAM WITH UMBILICAL HERNIA REPAIR performed by Stuart Olivo MD at [address] [place] [address]      [facility] 27- pt unsure of results or whom    COLONOSCOPY N/A 2020    Dr Carole Owusu prep, iInternal hemorrhoids-Grade 1, repeat with better prepif still symptomatic    ESOPHAGEAL DILATATION  2020    Dr Davis Camarena HERNIA REPAIR      TONSILLECTOMY AND ADENOIDECTOMY      UPPER GASTROINTESTINAL ENDOSCOPY N/A 2020     Kati-NORMAL exam, empiric dil with #54F saucedo, NEG EoE, NEG celiac   ,   Social History     Tobacco Use    Smoking status: Former Smoker     Packs/day: 0.25     Years: 15.00     Pack years: 3.75     Types: Cigarettes     Quit date:      Years since quittin.7    Smokeless tobacco: Never Used   Vaping Use    Vaping Use: Never used   Substance Use Topics    Alcohol use: Not Currently    Drug use: Not Currently   ,   Family History   Problem Relation Age of Onset    Diabetes Mother     High Blood Pressure Mother     Sleep Apnea Mother     Prostate Cancer Maternal Grandfather     Colon Polyps Neg Hx     Esophageal Cancer Neg Hx     Stomach Cancer Neg Hx     Rectal Cancer Neg Hx     Liver Cancer Neg Hx     Colon Cancer Neg Hx        PHYSICAL EXAMINATION:  Physical Exam  Vitals and nursing note reviewed. Constitutional:       Appearance: Normal appearance. He is well-developed. HENT:      Head: Normocephalic. Eyes:      Conjunctiva/sclera: Conjunctivae normal.      Pupils: Pupils are equal, round, and reactive to light. Pulmonary:      Effort: Pulmonary effort is normal.   Skin:     General: Skin is warm and dry. Neurological:      General: No focal deficit present. Mental Status: He is alert and oriented to person, place, and time. Psychiatric:         Mood and Affect: Mood normal.         Behavior: Behavior normal.         Thought Content: Thought content normal.         Judgment: Judgment normal.         Due to this being a TeleHealth encounter, evaluation of the following organ systems is limited: Vitals/Constitutional/EENT/Resp/CV/GI//MS/Neuro/Skin/Heme-Lymph-Imm. ASSESSMENT/PLAN:  1. Depression with anxiety      2. Mental developmental delay  The culture requesting a DSM culture I am going to have to find somebody with psychiatry or psychology experience to perform the test for us on him.   [facility] psychiatry sent me a message back they do not do IQ test so going to refer him to [facility] who does. No follow-ups on file. An  electronic signature was used to authenticate this note. --DOROTHY Durand CNP on 10/6/2021 at 10:34 AM        Pursuant to the emergency declaration under the [address] [place] [address], [facility] waiver authority and the [employer] and [employer] Act, this Virtual  Visit was conducted, with patient's consent, to reduce the patient's risk of exposure to COVID-19 and provide continuity of care for an established patient. Services were provided through a video synchronous discussion virtually to substitute for in-person clinic visit.

## 2021-10-14 RX ORDER — BUPROPION HYDROCHLORIDE 150 MG/1
150 TABLET ORAL EVERY MORNING
Qty: 30 TABLET | Refills: 3 | Status: SHIPPED | OUTPATIENT
Start: 2021-10-14 | End: 2022-02-21 | Stop reason: SDUPTHER

## 2021-10-26 DIAGNOSIS — G62.9 NEUROPATHY: ICD-10-CM

## 2021-10-26 RX ORDER — GABAPENTIN 300 MG/1
300 CAPSULE ORAL 3 TIMES DAILY
Qty: 90 CAPSULE | Refills: 1 | Status: SHIPPED | OUTPATIENT
Start: 2021-10-26 | End: 2022-05-17

## 2021-11-29 RX ORDER — ARIPIPRAZOLE 5 MG/1
5 TABLET ORAL DAILY
Qty: 30 TABLET | Refills: 3 | Status: SHIPPED | OUTPATIENT
Start: 2021-11-29 | End: 2022-02-21 | Stop reason: SDUPTHER

## 2021-11-29 RX ORDER — OMEPRAZOLE 20 MG/1
20 CAPSULE, DELAYED RELEASE ORAL
Qty: 60 CAPSULE | Refills: 3 | Status: SHIPPED | OUTPATIENT
Start: 2021-11-29 | End: 2022-04-05 | Stop reason: SDUPTHER

## 2021-12-08 ENCOUNTER — HOSPITAL ENCOUNTER (OUTPATIENT)
Dept: PAIN MANAGEMENT | Age: 51
Discharge: HOME | End: 2021-12-08

## 2021-12-14 ENCOUNTER — TELEPHONE (OUTPATIENT)
Dept: PAIN MANAGEMENT | Age: 51
End: 2021-12-14

## 2022-02-21 RX ORDER — BUPROPION HYDROCHLORIDE 150 MG/1
150 TABLET ORAL EVERY MORNING
Qty: 30 TABLET | Refills: 3 | Status: SHIPPED | OUTPATIENT
Start: 2022-02-21 | End: 2022-05-17 | Stop reason: DRUGHIGH

## 2022-02-21 RX ORDER — ARIPIPRAZOLE 5 MG/1
5 TABLET ORAL DAILY
Qty: 30 TABLET | Refills: 3 | Status: SHIPPED | OUTPATIENT
Start: 2022-02-21 | End: 2022-10-03 | Stop reason: SDUPTHER

## 2022-03-07 ENCOUNTER — OFFICE VISIT (OUTPATIENT)
Dept: PRIMARY CARE CLINIC | Age: 52
End: 2022-03-07
Payer: MEDICAID

## 2022-03-07 VITALS
TEMPERATURE: 97.6 F | HEIGHT: 66 IN | DIASTOLIC BLOOD PRESSURE: 84 MMHG | SYSTOLIC BLOOD PRESSURE: 126 MMHG | BODY MASS INDEX: 39.21 KG/M2 | OXYGEN SATURATION: 98 % | WEIGHT: 244 LBS | HEART RATE: 77 BPM

## 2022-03-07 DIAGNOSIS — J39.8 CONGESTION OF UPPER RESPIRATORY TRACT: ICD-10-CM

## 2022-03-07 DIAGNOSIS — R05.9 COUGH: Primary | ICD-10-CM

## 2022-03-07 DIAGNOSIS — R53.83 FATIGUE, UNSPECIFIED TYPE: ICD-10-CM

## 2022-03-07 LAB
S PYO AG THROAT QL: NORMAL
SARS-COV-2, PCR: NOT DETECTED

## 2022-03-07 PROCEDURE — 87880 STREP A ASSAY W/OPTIC: CPT | Performed by: NURSE PRACTITIONER

## 2022-03-07 PROCEDURE — G8427 DOCREV CUR MEDS BY ELIG CLIN: HCPCS | Performed by: NURSE PRACTITIONER

## 2022-03-07 PROCEDURE — 99213 OFFICE O/P EST LOW 20 MIN: CPT | Performed by: NURSE PRACTITIONER

## 2022-03-07 PROCEDURE — G8484 FLU IMMUNIZE NO ADMIN: HCPCS | Performed by: NURSE PRACTITIONER

## 2022-03-07 PROCEDURE — 1036F TOBACCO NON-USER: CPT | Performed by: NURSE PRACTITIONER

## 2022-03-07 PROCEDURE — G8417 CALC BMI ABV UP PARAM F/U: HCPCS | Performed by: NURSE PRACTITIONER

## 2022-03-07 RX ORDER — FLUTICASONE PROPIONATE 50 MCG
2 SPRAY, SUSPENSION (ML) NASAL NIGHTLY
Qty: 1 EACH | Refills: 0 | Status: SHIPPED | OUTPATIENT
Start: 2022-03-07

## 2022-03-07 RX ORDER — ATORVASTATIN CALCIUM 40 MG/1
40 TABLET, FILM COATED ORAL DAILY
Qty: 90 TABLET | Refills: 3 | Status: SHIPPED | OUTPATIENT
Start: 2022-03-07

## 2022-03-07 RX ORDER — METHYLPREDNISOLONE 4 MG/1
TABLET ORAL
Qty: 1 KIT | Refills: 0 | Status: SHIPPED | OUTPATIENT
Start: 2022-03-07 | End: 2022-03-13

## 2022-03-07 ASSESSMENT — PATIENT HEALTH QUESTIONNAIRE - PHQ9
SUM OF ALL RESPONSES TO PHQ QUESTIONS 1-9: 0
9. THOUGHTS THAT YOU WOULD BE BETTER OFF DEAD, OR OF HURTING YOURSELF: 0
SUM OF ALL RESPONSES TO PHQ QUESTIONS 1-9: 0
7. TROUBLE CONCENTRATING ON THINGS, SUCH AS READING THE NEWSPAPER OR WATCHING TELEVISION: 0
5. POOR APPETITE OR OVEREATING: 0
4. FEELING TIRED OR HAVING LITTLE ENERGY: 0
10. IF YOU CHECKED OFF ANY PROBLEMS, HOW DIFFICULT HAVE THESE PROBLEMS MADE IT FOR YOU TO DO YOUR WORK, TAKE CARE OF THINGS AT HOME, OR GET ALONG WITH OTHER PEOPLE: 0
2. FEELING DOWN, DEPRESSED OR HOPELESS: 0
1. LITTLE INTEREST OR PLEASURE IN DOING THINGS: 0
SUM OF ALL RESPONSES TO PHQ QUESTIONS 1-9: 0
SUM OF ALL RESPONSES TO PHQ9 QUESTIONS 1 & 2: 0
SUM OF ALL RESPONSES TO PHQ QUESTIONS 1-9: 0
6. FEELING BAD ABOUT YOURSELF - OR THAT YOU ARE A FAILURE OR HAVE LET YOURSELF OR YOUR FAMILY DOWN: 0
8. MOVING OR SPEAKING SO SLOWLY THAT OTHER PEOPLE COULD HAVE NOTICED. OR THE OPPOSITE, BEING SO FIGETY OR RESTLESS THAT YOU HAVE BEEN MOVING AROUND A LOT MORE THAN USUAL: 0
3. TROUBLE FALLING OR STAYING ASLEEP: 0

## 2022-03-07 ASSESSMENT — ENCOUNTER SYMPTOMS
COUGH: 1
EYES NEGATIVE: 1
GASTROINTESTINAL NEGATIVE: 1
SORE THROAT: 1

## 2022-03-07 NOTE — PROGRESS NOTES
[facility] PHYSICIAN SERVICES  [facility]  [address]  [address] [address]  Dept: [phone]  Dept Fax: [phone]  Loc: [phone]    Jamey Villagomez is a 54 y.o. male who presents today for his medical conditions/complaints as noted below. Jamey Villagomez is c/o of Cough (Symptoms started yesterday.), Congestion, Pharyngitis, Fatigue, and Generalized Body Aches        HPI:     HPI     This 72-year-old male presents today for cough, congestion, sore throat, fatigue and general body aches. Chief Complaint   Patient presents with    Cough     Symptoms started yesterday.  Congestion    Pharyngitis    Fatigue    Generalized Body Aches     Past Medical History:   Diagnosis Date    CHF (congestive heart failure) ([facility])     stage !     COPD (chronic obstructive pulmonary disease) ([facility])     COVID-19     CPAP (continuous positive airway pressure) dependence     10cm to 20cm    GERD (gastroesophageal reflux disease)     Hyperlipidemia     Hypertension     Obstructive sleep apnea     AHI: 84.1 night one and 67.3 night two, per HST, 1/2020    KEM (obstructive sleep apnea)       Past Surgical History:   Procedure Laterality Date    BACK SURGERY  2013    4 rods and 4 screws in back-[facility]    CHOLECYSTECTOMY, LAPAROSCOPIC N/A 11/17/2020    LAPAROSCOPIC CHOLECYSTECTOMY WITH CHOLANGIOGRAM WITH UMBILICAL HERNIA REPAIR performed by Phoebe Brock MD at [address] [place] [address]  2008    [facility]- pt unsure of results or whom    COLONOSCOPY N/A 9/24/2020    Dr Pernell Hess prep, iInternal hemorrhoids-Grade 1, repeat with better prepif still symptomatic    ESOPHAGEAL DILATATION  9/24/2020    Dr Tomasz Cool HERNIA REPAIR      TONSILLECTOMY AND ADENOIDECTOMY      UPPER GASTROINTESTINAL ENDOSCOPY N/A 9/24/2020    Dr Angeli Carbajal exam, empiric dil with #54F saucedo, NEG EoE, NEG celiac       Vitals 3/7/2022 7/21/2021 7/13/2021 6/23/2021 6/14/2021 1/9/5586   SYSTOLIC 877 724 580 718 747 724   DIASTOLIC 84 94 84 63 82 88   Site Left Upper Arm - Left Upper Arm - - -   Position Sitting - Sitting - - -   Cuff Size Large Adult - Large Adult - - -   Pulse 77 75 84 97 91 79   Temp 97.6 97 98.2 97.1 97 -   Resp - - - 16 - -   SpO2 98 100 99 96 95 96   Weight 244 lb 244 lb 244 lb 244 lb 244 lb 243 lb   Height 5' 6\" 5' 6\" 5' 6\" 5' 6\" 5' 6\" 5' 6\"   Body mass index 39.38 kg/m2 39.38 kg/m2 39.38 kg/m2 39.38 kg/m2 39.38 kg/m2 39.22 kg/m2   Pain Level - 7 - - 8 -   Some recent data might be hidden       Family History   Problem Relation Age of Onset    Diabetes Mother     High Blood Pressure Mother     Sleep Apnea Mother     Prostate Cancer Maternal Grandfather     Colon Polyps Neg Hx     Esophageal Cancer Neg Hx     Stomach Cancer Neg Hx     Rectal Cancer Neg Hx     Liver Cancer Neg Hx     Colon Cancer Neg Hx        Social History     Tobacco Use    Smoking status: Former Smoker     Packs/day: 0.25     Years: 15.00     Pack years: 3.75     Types: Cigarettes     Quit date: 2019     Years since quitting: 3.1    Smokeless tobacco: Never Used   Substance Use Topics    Alcohol use: Not Currently      Current Outpatient Medications on File Prior to Visit   Medication Sig Dispense Refill    ARIPiprazole (ABILIFY) 5 MG tablet Take 1 tablet by mouth daily 30 tablet 3    buPROPion (WELLBUTRIN XL) 150 MG extended release tablet Take 1 tablet by mouth every morning 30 tablet 3    omeprazole (PRILOSEC) 20 MG delayed release capsule Take 1 capsule by mouth 2 times daily (before meals) 60 capsule 3    rOPINIRole (REQUIP) 0.5 MG tablet Take 1 q hs 30 tablet 5    cyclobenzaprine (FLEXERIL) 10 MG tablet Take 1 tablet by mouth 3 times daily as needed for Muscle spasms 90 tablet 1    lisinopril (PRINIVIL;ZESTRIL) 20 MG tablet daily       torsemide (DEMADEX) 20 MG tablet 20 mg 2 times daily       blood glucose test strips (ONE TOUCH ULTRA TEST) strip USE TO TEST TWICE DAILY 100 strip 3    Lancets MISC 1 each by Does not apply route 2 times daily 100 each 5    glucose monitoring kit (FREESTYLE) monitoring kit 1 kit by Does not apply route daily 1 kit 0    gabapentin (NEURONTIN) 300 MG capsule Take 1 capsule by mouth 3 times daily for 30 days. 90 capsule 1     No current facility-administered medications on file prior to visit. No Known Allergies    Health Maintenance   Topic Date Due    Hepatitis C screen  Never done    Pneumococcal 0-64 years Vaccine (1 of 2 - PPSV23) Never done    HIV screen  Never done    DTaP/Tdap/Td vaccine (1 - Tdap) Never done    A1C test (Diabetic or Prediabetic)  07/07/2021    Lipid screen  07/07/2021    Flu vaccine (1) 09/01/2021    Potassium monitoring  09/04/2021    Creatinine monitoring  09/04/2021    COVID-19 Vaccine (3 - Booster for Moderna series) 10/10/2021    Depression Monitoring  03/07/2023    Colorectal Cancer Screen  09/24/2023    Hepatitis A vaccine  Aged Out    Hepatitis B vaccine  Aged Out    Hib vaccine  Aged Out    Meningococcal (ACWY) vaccine  Aged Out       Subjective:      Review of Systems   Constitutional: Negative. Negative for chills, fatigue and unexpected weight change. HENT: Positive for congestion, sneezing and sore throat. Negative for ear pain. Eyes: Negative. Respiratory: Positive for cough. Cardiovascular: Negative. Gastrointestinal: Negative. Endocrine: Negative. Genitourinary: Negative. Musculoskeletal: Negative. Negative for myalgias. Skin: Negative. Allergic/Immunologic: Positive for environmental allergies. Neurological: Negative. Hematological: Negative. Psychiatric/Behavioral: Negative. Objective:     Physical Exam  Vitals and nursing note reviewed. Constitutional:       General: He is not in acute distress. Appearance: Normal appearance. He is not ill-appearing or toxic-appearing. HENT:      Head: Normocephalic and atraumatic. Right Ear: There is impacted cerumen. Left Ear: There is impacted cerumen. Nose: Congestion present. Mouth/Throat:      Mouth: Mucous membranes are moist.      Pharynx: Posterior oropharyngeal erythema present. Eyes:      Extraocular Movements: Extraocular movements intact. Conjunctiva/sclera: Conjunctivae normal.      Pupils: Pupils are equal, round, and reactive to light. Cardiovascular:      Rate and Rhythm: Normal rate and regular rhythm. Pulses: Normal pulses. Heart sounds: Normal heart sounds. Pulmonary:      Effort: Pulmonary effort is normal. No respiratory distress. Breath sounds: Normal breath sounds. No wheezing, rhonchi or rales. Abdominal:      General: Bowel sounds are normal.      Palpations: Abdomen is soft. Musculoskeletal:         General: Normal range of motion. Cervical back: Normal range of motion and neck supple. No rigidity or tenderness. Lymphadenopathy:      Cervical: No cervical adenopathy. Skin:     General: Skin is warm and dry. Coloration: Skin is not jaundiced or pale. Findings: No erythema or rash. Neurological:      Mental Status: He is alert and oriented to person, place, and time. Mental status is at baseline. Psychiatric:         Mood and Affect: Mood normal.         Behavior: Behavior normal.       /84 (Site: Left Upper Arm, Position: Sitting, Cuff Size: Large Adult)   Pulse 77   Temp 97.6 °F (36.4 °C)   Ht 5' 6\" (1.676 m)   Wt 244 lb (110.7 kg)   SpO2 98%   BMI 39.38 kg/m²     Assessment:       Diagnosis Orders   1. Cough  POCT rapid strep A   2. Congestion of upper respiratory tract  POCT rapid strep A   3. Fatigue, unspecified type  POCT rapid strep A         Plan:   POCT rapid strep in office today. Results reviewed negative. Covid testing in office today. Results reviewed negative. Viral syndrome   Many illnesses are caused by viruses. These conditions usually run their course in 7-14 days. Antibiotics do not help fight viral infections and are not needed at this time. Viral syndromes are treated with symptomatic support. You may take tylenol or ibuprofen for fever or aches and pains. Stay hydrated by taking sips of water or non caffeinated, noncarbonated, and nonalcoholic beverages throughout the day. For sore throat, you may gargle with warm salt water, use lozenges or sprays. Using a daily antihistamine such as Claritin, Zyrtec or Allegra can help with upper respiratory symptoms. Benadryl can be sedating but is helpful at drying secretions and may be taken at night. Call if you have a fever greater than 102 F or if symptoms do not improve. Your provider may also send you in prescription medications depending on your symptoms and their severity. Take all medications as directed on package unless specifically told otherwise. Nystatin for 14 days. Antihistamine of choice for 14 days. Medrol Dosepak take as directed to complete. Patient given educational materials -see patient instructions. Discussed use, benefit, and side effects of prescribed medications. All patient questions answered. Pt voiced understanding. Reviewed health maintenance. Instructed to continue currentmedications, diet and exercise. Patient agreed with treatment plan. Follow up as directed. MEDICATIONS:  Orders Placed This Encounter   Medications    fluticasone (FLONASE) 50 MCG/ACT nasal spray     Si sprays by Each Nostril route nightly     Dispense:  1 each     Refill:  0    methylPREDNISolone (MEDROL DOSEPACK) 4 MG tablet     Sig: Take by mouth. Dispense:  1 kit     Refill:  0         ORDERS:  Orders Placed This Encounter   Procedures    COVID-19    POCT rapid strep A       Follow-up:  Return in about 2 weeks (around 3/21/2022). PATIENT INSTRUCTIONS:  Patient Instructions        Covid Supportive Treatments    Zinc 250 mg daily for 5 days and then decrease to 50 mg a day. Vitamin C 1000 mg a day. Vitamin D 1056-4364 mcg a day. Aspirin 325 mg a day. Daily antihistamine of choice ( Claritin, Allegra, or Zyrtec)  Pepcid daily. Tylenol for aches, pain and fever. Your provider may also send in prescriptions for symptom specific treatment  our provider may also send in prescriptions for symptom specific treatment. Covid is a viral pneumonia and some antibiotics will not help this. Mucinex or delsym for chest congestion. Check oxygen saturation regularly and if below 90% go to the ER. You may qualify for home oxygen and breathing treatments if your oxygen is staying around 90%. According to current [place] guidelines if you have tested positive for COVID and have symptoms you are to isolate for 10 days from the day the symptoms began. If you tested positive for COVID and have never had symptoms you must isolate for 5 days from the day of your test.  If you are not fully vaccinated or booster eligible but not yet boostered and have been in close contact with someone diagnosed with COVID quarantine from 10 days from the exposure. This may be shortened to 5 days if you have no symptoms and tested negative for COVID on day 5    Viral syndrome   Many illnesses are caused by viruses. These conditions usually run their course in 7-14 days. Antibiotics do not help fight viral infections and are not needed at this time. Viral syndromes are treated with symptomatic support. You may take tylenol or ibuprofen for fever or aches and pains. Stay hydrated by taking sips of water or non caffeinated, noncarbonated, and nonalcoholic beverages throughout the day. For sore throat, you may gargle with warm salt water, use lozenges or sprays. Using a daily antihistamine such as Claritin, Zyrtec or Allegra can help with upper respiratory symptoms. Benadryl can be sedating but is helpful at drying secretions and may be taken at night. Call if you have a fever greater than 102 F or if symptoms do not improve.  Your provider may also send you in prescription medications depending on your symptoms and their severity. Take all medications as directed on package unless specifically told otherwise. Patient Education        Coronavirus ([id number]): Care Instructions  Overview  The coronavirus disease (COVID-19) is caused by a virus. Symptoms may include a fever, a cough, and shortness of breath. It can spread through droplets from coughing and sneezing, breathing, and singing. The virus also can spread when people are in close contact with someone who is infected. Most people have mild symptoms and can take care of themselves at home. If their symptoms get worse, they may need care in a hospital. Treatment may include medicines to reduce symptoms, plus breathing support such as oxygen therapy or a ventilator. It's important to not spread the virus to others. If you have COVID-19, wear a mask anytime you are around other people. It can help stop the spread of the virus. You need to isolate yourself while you are sick. Leave your home only if you need to get medical care or testing. Follow-up care is a key part of your treatment and safety. Be sure to make and go to all appointments, and call your doctor if you are having problems. It's also a good idea to know your test results and keep a list of the medicines you take. How can you care for yourself at home? · Get extra rest. It can help you feel better. · Drink plenty of fluids. This helps replace fluids lost from fever. Fluids may also help ease a scratchy throat. · You can take acetaminophen (Tylenol) or ibuprofen (Advil, Motrin) to reduce a fever. It may also help with muscle and body aches. Read and follow all instructions on the label. · Use petroleum jelly on sore skin. This can help if the skin around your nose and lips becomes sore from rubbing a lot with tissues. If you use oxygen, use a water-based product instead of petroleum jelly.   · Keep track of symptoms such as fever and shortness of breath. This can help you know if you need to call your doctor. It can also help you know when it's safe to be around other people. · In some cases, your doctor might suggest that you get a pulse oximeter. How can you self-isolate when you have COVID-19? If you have COVID-19, there are things you can do to help avoid spreading the virus to others. · Limit contact with people in your home. If possible, stay in a separate bedroom and use a separate bathroom. · Wear a mask when you are around other people. · If you have to leave home, avoid crowds and try to stay at least 6 feet away from other people. · Avoid contact with pets and other animals. · Cover your mouth and nose with a tissue when you cough or sneeze. Then throw it in the trash right away. · Wash your hands often, especially after you cough or sneeze. Use soap and water, and scrub for at least 20 seconds. If soap and water aren't available, use an alcohol-based hand . · Don't share personal household items. These include bedding, towels, cups and glasses, and eating utensils. · [address] in the warmest water allowed for the fabric type, and dry it completely. It's okay to wash other people's laundry with yours. · Clean and disinfect your home. Use household  and disinfectant wipes or sprays. When can you end self-isolation for COVID-19? If you know or think that you have the virus, you will need to self-isolate. You can be around others after:  · It's been at least 10 days since your symptoms started and  · You haven't had a fever for 24 hours without taking medicines to lower the fever and  · Your symptoms are improving. If you tested positive but have no symptoms, you can end isolation after 10 days. But if you start to have symptoms, follow the steps above. Ask your doctor if you need to be tested before you end isolation.  This is especially important if you have a weakened immune system. When should you call for help? Call 911 anytime you think you may need emergency care. For example, call if you have life-threatening symptoms, such as:    · You have severe trouble breathing. (You can't talk at all.)     · You have constant chest pain or pressure.     · You are severely dizzy or lightheaded.     · You are confused or can't think clearly.     · You have pale, gray, or blue-colored skin or lips.     · You pass out (lose consciousness) or are very hard to wake up. Call your doctor now or seek immediate medical care if:    · You have moderate trouble breathing. (You can't speak a full sentence.)     · You are coughing up blood (more than about 1 teaspoon).     · You have signs of low blood pressure. These include feeling lightheaded; being too weak to stand; and having cold, pale, clammy skin. Watch closely for changes in your health, and be sure to contact your doctor if:    · Your symptoms get worse.     · You are not getting better as expected.     · You have new or worse symptoms of anxiety, depression, nightmares, or flashbacks. Call before you go to the doctor's office. Follow their instructions. And wear a mask. Current as of: July 1, 2021               Content Version: 13.1  © 6593-3065 [employer]wise, [employer]. Care instructions adapted under license by [facility] ([facility]). If you have questions about a medical condition or this instruction, always ask your healthcare professional. [place] [address] any warranty or liability for your use of this information. Patient Education        10 Things to Do When You Have COVID-19      Stay home. Don't go to school, work, or public areas. And don't use public transportation, ride-shares, or taxis unless you have no choice. Leave your home only if you need to get medical care. But call the doctor's office first so they know you're coming. And wear a mask. Ask before leaving isolation.   Follow your doctor's advice about when it is safe for you to leave isolation. Wear a mask when you are around other people. It can help stop the spread of the virus. Limit contact with people in your home. If possible, stay in a separate bedroom and use a separate bathroom. Avoid contact with pets and other animals. If possible, have a friend or family member care for them while you're sick. Cover your mouth and nose with a tissue when you cough or sneeze. Then throw the tissue in the trash right away. Wash your hands often, especially after you cough or sneeze. Use soap and water, and scrub for at least 20 seconds. If soap and water aren't available, use an alcohol-based hand . Don't share personal household items. These include bedding, towels, cups and glasses, and eating utensils. Clean and disinfect your home every day. Use household  or disinfectant wipes or sprays. If needed, take acetaminophen (Tylenol) or ibuprofen (Advil, Motrin) to relieve fever and body aches. Read and follow all instructions on the label. Current as of: March 26, 2021               Content Version: 13.1  © 1758-6491 Healthwise, [facility]. Care instructions adapted under license by [facility] ([facility]). If you have questions about a medical condition or this instruction, always ask your healthcare professional. [place] [address] any warranty or liability for your use of this information. Electronically signed by DOROTHY Miller NP on 3/8/2022 at 5:14 PM    EMR Dragon/transcription disclaimer:  Much of thisencounter note is electronic transcription/translation of spoken language to printed texts. The electronic translation of spoken language may be erroneous, or at times, nonsensical words or phrases may be inadvertentlytranscribed.   Although I have reviewed the note for such errors, some may still exist.

## 2022-03-07 NOTE — LETTER
[place] [address] [address]  Phone: [phone]  Fax: [phone]    DOROTHY Melgar NP        March 7, 2022     Patient: Sheila Escobar   YOB: 1970   Date of Visit: 3/7/2022       To Whom it May Concern:    Sheila Escobar was seen in my clinic on 3/7/2022. He may return to work on 3/9/2022. If you have any questions or concerns, please don't hesitate to call.     Sincerely,         DOROTHY Melgar NP

## 2022-03-07 NOTE — PATIENT INSTRUCTIONS
Covid Supportive Treatments    Zinc 250 mg daily for 5 days and then decrease to 50 mg a day. Vitamin C 1000 mg a day. Vitamin D 8914-5594 mcg a day. Aspirin 325 mg a day. Daily antihistamine of choice ( Claritin, Allegra, or Zyrtec)  Pepcid daily. Tylenol for aches, pain and fever. Your provider may also send in prescriptions for symptom specific treatment  our provider may also send in prescriptions for symptom specific treatment. Covid is a viral pneumonia and some antibiotics will not help this. Mucinex or delsym for chest congestion. Check oxygen saturation regularly and if below 90% go to the ER. You may qualify for home oxygen and breathing treatments if your oxygen is staying around 90%. According to current [place] guidelines if you have tested positive for COVID and have symptoms you are to isolate for 10 days from the day the symptoms began. If you tested positive for COVID and have never had symptoms you must isolate for 5 days from the day of your test.  If you are not fully vaccinated or booster eligible but not yet boostered and have been in close contact with someone diagnosed with COVID quarantine from 10 days from the exposure. This may be shortened to 5 days if you have no symptoms and tested negative for COVID on day 5    Viral syndrome   Many illnesses are caused by viruses. These conditions usually run their course in 7-14 days. Antibiotics do not help fight viral infections and are not needed at this time. Viral syndromes are treated with symptomatic support. You may take tylenol or ibuprofen for fever or aches and pains. Stay hydrated by taking sips of water or non caffeinated, noncarbonated, and nonalcoholic beverages throughout the day. For sore throat, you may gargle with warm salt water, use lozenges or sprays. Using a daily antihistamine such as Claritin, Zyrtec or Allegra can help with upper respiratory symptoms.  Benadryl can be sedating but is helpful at drying secretions and may be taken at night. Call if you have a fever greater than 102 F or if symptoms do not improve. Your provider may also send you in prescription medications depending on your symptoms and their severity. Take all medications as directed on package unless specifically told otherwise. Patient Education        Coronavirus ([id number]): Care Instructions  Overview  The coronavirus disease (COVID-19) is caused by a virus. Symptoms may include a fever, a cough, and shortness of breath. It can spread through droplets from coughing and sneezing, breathing, and singing. The virus also can spread when people are in close contact with someone who is infected. Most people have mild symptoms and can take care of themselves at home. If their symptoms get worse, they may need care in a hospital. Treatment may include medicines to reduce symptoms, plus breathing support such as oxygen therapy or a ventilator. It's important to not spread the virus to others. If you have COVID-19, wear a mask anytime you are around other people. It can help stop the spread of the virus. You need to isolate yourself while you are sick. Leave your home only if you need to get medical care or testing. Follow-up care is a key part of your treatment and safety. Be sure to make and go to all appointments, and call your doctor if you are having problems. It's also a good idea to know your test results and keep a list of the medicines you take. How can you care for yourself at home? · Get extra rest. It can help you feel better. · Drink plenty of fluids. This helps replace fluids lost from fever. Fluids may also help ease a scratchy throat. · You can take acetaminophen (Tylenol) or ibuprofen (Advil, Motrin) to reduce a fever. It may also help with muscle and body aches. Read and follow all instructions on the label. · Use petroleum jelly on sore skin.  This can help if the skin around your nose and lips becomes sore from rubbing a lot with tissues. If you use oxygen, use a water-based product instead of petroleum jelly. · Keep track of symptoms such as fever and shortness of breath. This can help you know if you need to call your doctor. It can also help you know when it's safe to be around other people. · In some cases, your doctor might suggest that you get a pulse oximeter. How can you self-isolate when you have COVID-19? If you have COVID-19, there are things you can do to help avoid spreading the virus to others. · Limit contact with people in your home. If possible, stay in a separate bedroom and use a separate bathroom. · Wear a mask when you are around other people. · If you have to leave home, avoid crowds and try to stay at least 6 feet away from other people. · Avoid contact with pets and other animals. · Cover your mouth and nose with a tissue when you cough or sneeze. Then throw it in the trash right away. · Wash your hands often, especially after you cough or sneeze. Use soap and water, and scrub for at least 20 seconds. If soap and water aren't available, use an alcohol-based hand . · Don't share personal household items. These include bedding, towels, cups and glasses, and eating utensils. · [address] in the warmest water allowed for the fabric type, and dry it completely. It's okay to wash other people's laundry with yours. · Clean and disinfect your home. Use household  and disinfectant wipes or sprays. When can you end self-isolation for COVID-19? If you know or think that you have the virus, you will need to self-isolate. You can be around others after:  · It's been at least 10 days since your symptoms started and  · You haven't had a fever for 24 hours without taking medicines to lower the fever and  · Your symptoms are improving. If you tested positive but have no symptoms, you can end isolation after 10 days. But if you start to have symptoms, follow the steps above.   Ask your doctor if you need to be tested before you end isolation. This is especially important if you have a weakened immune system. When should you call for help? Call 911 anytime you think you may need emergency care. For example, call if you have life-threatening symptoms, such as:    · You have severe trouble breathing. (You can't talk at all.)     · You have constant chest pain or pressure.     · You are severely dizzy or lightheaded.     · You are confused or can't think clearly.     · You have pale, gray, or blue-colored skin or lips.     · You pass out (lose consciousness) or are very hard to wake up. Call your doctor now or seek immediate medical care if:    · You have moderate trouble breathing. (You can't speak a full sentence.)     · You are coughing up blood (more than about 1 teaspoon).     · You have signs of low blood pressure. These include feeling lightheaded; being too weak to stand; and having cold, pale, clammy skin. Watch closely for changes in your health, and be sure to contact your doctor if:    · Your symptoms get worse.     · You are not getting better as expected.     · You have new or worse symptoms of anxiety, depression, nightmares, or flashbacks. Call before you go to the doctor's office. Follow their instructions. And wear a mask. Current as of: July 1, 2021               Content Version: 13.1  © 4570-9577 [employer]. Care instructions adapted under license by [facility] ([facility]). If you have questions about a medical condition or this instruction, always ask your healthcare professional. [place] [address] any warranty or liability for your use of this information. Patient Education        10 Things to Do When You Have COVID-19      Stay home. Don't go to school, work, or public areas. And don't use public transportation, ride-shares, or taxis unless you have no choice. Leave your home only if you need to get medical care.  But call the doctor's office

## 2022-03-09 ENCOUNTER — OFFICE VISIT (OUTPATIENT)
Dept: PRIMARY CARE CLINIC | Age: 52
End: 2022-03-09
Payer: MEDICAID

## 2022-03-09 VITALS
WEIGHT: 237 LBS | DIASTOLIC BLOOD PRESSURE: 86 MMHG | RESPIRATION RATE: 18 BRPM | BODY MASS INDEX: 38.09 KG/M2 | HEART RATE: 97 BPM | TEMPERATURE: 97.1 F | HEIGHT: 66 IN | OXYGEN SATURATION: 96 % | SYSTOLIC BLOOD PRESSURE: 122 MMHG

## 2022-03-09 DIAGNOSIS — J44.9 CHRONIC OBSTRUCTIVE PULMONARY DISEASE, UNSPECIFIED COPD TYPE (HCC): Primary | ICD-10-CM

## 2022-03-09 DIAGNOSIS — J06.9 URI, ACUTE: ICD-10-CM

## 2022-03-09 DIAGNOSIS — R05.9 COUGH: ICD-10-CM

## 2022-03-09 PROCEDURE — 99213 OFFICE O/P EST LOW 20 MIN: CPT | Performed by: FAMILY MEDICINE

## 2022-03-09 PROCEDURE — 1036F TOBACCO NON-USER: CPT | Performed by: FAMILY MEDICINE

## 2022-03-09 PROCEDURE — G8417 CALC BMI ABV UP PARAM F/U: HCPCS | Performed by: FAMILY MEDICINE

## 2022-03-09 PROCEDURE — G8427 DOCREV CUR MEDS BY ELIG CLIN: HCPCS | Performed by: FAMILY MEDICINE

## 2022-03-09 PROCEDURE — G8484 FLU IMMUNIZE NO ADMIN: HCPCS | Performed by: FAMILY MEDICINE

## 2022-03-09 PROCEDURE — 3023F SPIROM DOC REV: CPT | Performed by: FAMILY MEDICINE

## 2022-03-09 RX ORDER — BENZONATATE 200 MG/1
200 CAPSULE ORAL 3 TIMES DAILY PRN
Qty: 30 CAPSULE | Refills: 0 | Status: SHIPPED | OUTPATIENT
Start: 2022-03-09 | End: 2022-03-16

## 2022-03-09 RX ORDER — DEXTROMETHORPHAN HYDROBROMIDE AND PROMETHAZINE HYDROCHLORIDE 15; 6.25 MG/5ML; MG/5ML
5 SYRUP ORAL 4 TIMES DAILY PRN
Qty: 240 ML | Refills: 0 | Status: SHIPPED | OUTPATIENT
Start: 2022-03-09 | End: 2022-03-16

## 2022-03-09 ASSESSMENT — ENCOUNTER SYMPTOMS
COUGH: 0
DIARRHEA: 0
NAUSEA: 0
ABDOMINAL PAIN: 0
CONSTIPATION: 0
VOMITING: 0
RHINORRHEA: 0
COLOR CHANGE: 0

## 2022-03-09 NOTE — PROGRESS NOTES
SUBJECTIVE:    Patient ID: Sandie Edmonds is a 54 y.o. male. HPI:   Patient was seen on Monday for complaints of a cough. He saw OCTAVIO. She did do a Covid test on him which was negative. He states that she did not do a chest x-ray. He states that his cough is not getting any better and if anything may be getting worse. He is taking steroids which she prescribed for him. He states that he is not short of breath. He is not wheezing. He is not coughing up anything. He states the cough is worse at night. He denies any sore throat nausea or vomiting. He denies any significant weight change. He states that he is taking Mucinex at home currently. Past Medical History:   Diagnosis Date    CHF [facility]congestive heart failure) ([facility] [address]     stage !  [facility] obstructive [facility] disease) [facility]     COVID-19     CPAP (continuous positive airway pressure) dependence     10cm to 20cm    GERD (gastroesophageal reflux disease)     Hyperlipidemia     Hypertension     Obstructive sleep apnea     AHI: 84.1 night one and 67.3 night two, per HST, 1/2020    KEM (obstructive sleep apnea)       Current Outpatient Medications on File Prior to Visit   Medication Sig Dispense Refill    fluticasone (FLONASE) 50 MCG/ACT nasal spray 2 sprays by Each Nostril route nightly 1 each 0    methylPREDNISolone (MEDROL DOSEPACK) 4 MG tablet Take by mouth.  1 kit 0    atorvastatin (LIPITOR) 40 MG tablet Take 1 tablet by mouth daily 90 tablet 3    ARIPiprazole (ABILIFY) 5 MG tablet Take 1 tablet by mouth daily 30 tablet 3    buPROPion (WELLBUTRIN XL) 150 MG extended release tablet Take 1 tablet by mouth every morning 30 tablet 3    omeprazole (PRILOSEC) 20 MG delayed release capsule Take 1 capsule by mouth 2 times daily (before meals) 60 capsule 3    rOPINIRole (REQUIP) 0.5 MG tablet Take 1 q hs 30 tablet 5    cyclobenzaprine (FLEXERIL) 10 MG tablet Take 1 tablet by mouth 3 times daily as needed for Muscle spasms 90 tablet 1    lisinopril (PRINIVIL;ZESTRIL) 20 MG tablet daily       torsemide (DEMADEX) 20 MG tablet 20 mg 2 times daily       blood glucose test strips (ONE TOUCH ULTRA TEST) strip USE TO TEST TWICE DAILY 100 strip 3    Lancets MISC 1 each by Does not apply route 2 times daily 100 each 5    glucose monitoring kit (FREESTYLE) monitoring kit 1 kit by Does not apply route daily 1 kit 0    gabapentin (NEURONTIN) 300 MG capsule Take 1 capsule by mouth 3 times daily for 30 days. 90 capsule 1     No current facility-administered medications on file prior to visit. No Known Allergies    Review of Systems   Constitutional: Negative for activity change, appetite change and fatigue. HENT: Negative for congestion and rhinorrhea. Eyes: Negative for visual disturbance. Respiratory: Negative for cough. Cardiovascular: Negative for chest pain and palpitations. Gastrointestinal: Negative for abdominal pain, constipation, diarrhea, nausea and vomiting. Genitourinary: Negative for decreased urine volume and difficulty urinating. Musculoskeletal: Negative for arthralgias. Skin: Negative for color change and rash. Allergic/Immunologic: Negative for immunocompromised state. Neurological: Negative for seizures and headaches. Hematological: Does not bruise/bleed easily. Psychiatric/Behavioral: Negative for agitation and sleep disturbance. OBJECTIVE:    Physical Exam  Constitutional:       General: He is not in acute distress. Appearance: He is well-developed. He is not diaphoretic. HENT:      Head: Normocephalic and atraumatic. Neck:      Thyroid: No thyromegaly. Cardiovascular:      Rate and Rhythm: Normal rate and regular rhythm. Pulses: Normal pulses. Heart sounds: Normal heart sounds. Pulmonary:      Effort: Pulmonary effort is normal. No respiratory distress. Breath sounds: Normal breath sounds. No wheezing. Abdominal:      General: Abdomen is flat.  Bowel sounds are normal. Palpations: Abdomen is soft. Tenderness: There is no abdominal tenderness. Musculoskeletal:      Cervical back: Normal range of motion and neck supple. Lymphadenopathy:      Cervical: No cervical adenopathy. Skin:     General: Skin is warm and dry. Findings: No rash. Neurological:      Mental Status: He is alert and oriented to person, place, and time. Psychiatric:         Behavior: Behavior normal.         Thought Content: Thought content normal.         Judgment: Judgment normal.        /86   Pulse 97   Temp 97.1 °F (36.2 °C)   Resp 18   Ht 5' 6\" (1.676 m)   Wt 237 lb (107.5 kg)   SpO2 96%   BMI 38.25 kg/m²      ASSESSMENT/PLAN:    1. Chronic obstructive pulmonary disease, unspecified COPD type ([address]  2. Cough  3. URI, acute       Tessalon Perles and cough syrup are sent to the pharmacy. I encouraged him to continue the Mucinex and Flonase. He can finish off the steroids the patient started him on. I do not feel that he needs a chest x-ray today as his lungs are clear. If his symptoms are worsening or he develops shortness of breath he is to let us know. PDMP Monitoring:    Last PDMP [place] as Reviewed [facility]):  Review User Review Instant Review Result            Urine Drug Screenings (1 yr)    No resulted procedures found. Medication Contract and Consent for Opioid Use Documents Filed      No documents found                 EMR Dragon/transcription disclaimer:  Much of this encounter note is electronic transcription/translation of spoken language toprinted texts. The electronic translation of spoken language may be erroneous, or at times, nonsensical words or phrases may be inadvertently transcribed.   Although I have reviewed the note for such errors, some may stillexist.

## 2022-04-05 RX ORDER — OMEPRAZOLE 20 MG/1
20 CAPSULE, DELAYED RELEASE ORAL
Qty: 60 CAPSULE | Refills: 3 | Status: SHIPPED | OUTPATIENT
Start: 2022-04-05 | End: 2022-09-06 | Stop reason: SDUPTHER

## 2022-05-17 ENCOUNTER — OFFICE VISIT (OUTPATIENT)
Dept: PRIMARY CARE CLINIC | Age: 52
End: 2022-05-17
Payer: MEDICAID

## 2022-05-17 VITALS
RESPIRATION RATE: 16 BRPM | BODY MASS INDEX: 40.21 KG/M2 | TEMPERATURE: 97.6 F | WEIGHT: 250.2 LBS | SYSTOLIC BLOOD PRESSURE: 130 MMHG | HEIGHT: 66 IN | HEART RATE: 89 BPM | DIASTOLIC BLOOD PRESSURE: 82 MMHG | OXYGEN SATURATION: 97 %

## 2022-05-17 DIAGNOSIS — G47.33 OBSTRUCTIVE SLEEP APNEA: ICD-10-CM

## 2022-05-17 DIAGNOSIS — R63.5 WEIGHT GAIN: ICD-10-CM

## 2022-05-17 DIAGNOSIS — F41.8 DEPRESSION WITH ANXIETY: ICD-10-CM

## 2022-05-17 DIAGNOSIS — Z00.00 WELL ADULT ON ROUTINE HEALTH CHECK: Primary | ICD-10-CM

## 2022-05-17 DIAGNOSIS — Z13.220 ENCOUNTER FOR SCREENING FOR LIPID DISORDER: ICD-10-CM

## 2022-05-17 DIAGNOSIS — G25.81 RESTLESS LEG SYNDROME: ICD-10-CM

## 2022-05-17 DIAGNOSIS — Z13.1 SCREENING FOR DIABETES MELLITUS: ICD-10-CM

## 2022-05-17 PROBLEM — R11.0 NAUSEA: Status: RESOLVED | Noted: 2020-10-06 | Resolved: 2022-05-17

## 2022-05-17 PROBLEM — R09.02 HYPOXEMIA: Status: RESOLVED | Noted: 2020-04-27 | Resolved: 2022-05-17

## 2022-05-17 PROBLEM — R19.4 CHANGE IN BOWEL HABITS: Status: RESOLVED | Noted: 2020-07-14 | Resolved: 2022-05-17

## 2022-05-17 PROCEDURE — 99396 PREV VISIT EST AGE 40-64: CPT | Performed by: NURSE PRACTITIONER

## 2022-05-17 RX ORDER — BUPROPION HYDROCHLORIDE 300 MG/1
300 TABLET ORAL EVERY MORNING
Qty: 30 TABLET | Refills: 5 | Status: SHIPPED | OUTPATIENT
Start: 2022-05-17

## 2022-05-17 RX ORDER — BUSPIRONE HYDROCHLORIDE 10 MG/1
10 TABLET ORAL 2 TIMES DAILY
Qty: 60 TABLET | Refills: 5 | Status: SHIPPED | OUTPATIENT
Start: 2022-05-17 | End: 2022-06-16

## 2022-05-17 ASSESSMENT — ENCOUNTER SYMPTOMS
ALLERGIC/IMMUNOLOGIC NEGATIVE: 1
RESPIRATORY NEGATIVE: 1
GASTROINTESTINAL NEGATIVE: 1
EYES NEGATIVE: 1

## 2022-05-17 NOTE — PATIENT INSTRUCTIONS
Work on diet and exercise try to lose 20 pounds over the summer  Increase your wellbutrin to 300mg at day  May use the buspar 10mg twice a day as needed for bout of anxiety  Continue the abilify  Have fasting labs done at [facility].

## 2022-05-17 NOTE — PROGRESS NOTES
[facility]YSICIAN [facility]  [facility]  [address] [place] [address]  [address] [place] [address]  Dept: [phone]  Dept Fax: [phone]  Loc: [phone]    Misti Hall is a 54 y.o. male who presents today for his medical conditions/complaints as noted below. Misti Hall is c/o of Annual Exam        HPI:     HPI   Chief Complaint   Patient presents with    Annual Exam   He is doing well he did get the job as a  and he enjoys working. He will be off for the summer. He says he feels a little more anxious than normal having the kids home from school. He is wonder if there is something he can do more for his anxiety. Past Medical History:   Diagnosis Date    CHF (congestive heart failure) ([facility] [address]     stage !     COPD [facility]     COVID-19     CPAP (continuous positive airway pressure) dependence     10cm to 20cm    GERD (gastroesophageal reflux disease)     Hyperlipidemia     Hypertension     Obstructive sleep apnea     AHI: 84.1 night one and 67.3 night two, per HST, 1/2020    KEM (obstructive sleep apnea)       Past Surgical History:   Procedure Laterality Date    BACK SURGERY  2013    4 rods and 4 screws in [facility]    CHOLECYSTECTOMY, LAPAROSCOPIC N/A 11/17/2020    LAPAROSCOPIC CHOLECYSTECTOMY WITH CHOLANGIOGRAM WITH UMBILICAL HERNIA REPAIR performed by Catarina Mares MD at [address] [place] [address]  2008    [facility]- pt unsure of results or whom    COLONOSCOPY N/A 9/24/2020    Dr Kathyrn Nageotte prep, iInternal hemorrhoids-Grade 1, repeat with better prepif still symptomatic    ESOPHAGEAL DILATATION  9/24/2020    Dr Marisela Reddy HERNIA REPAIR      TONSILLECTOMY AND ADENOIDECTOMY      UPPER GASTROINTESTINAL ENDOSCOPY N/A 9/24/2020    Dr Fabby Bryan exam, empiric dil with #54F saucedo, NEG EoE, NEG celiac       Vitals 5/17/2022 3/9/2022 3/7/2022 7/21/2021 7/13/2021 1/36/9740   SYSTOLIC 130 122 126 [phone]   DIASTOLIC 82 86 84 94 84 63   Site - - Left Upper Arm - Left Upper Arm -   Position - - Sitting - Sitting -   Cuff Size - - Large Adult - Large Adult -   Pulse 89 97 77 75 84 97   Temp 97.6 97.1 97.6 97 98.2 97.1   Resp 16 18 - - - 16   SpO2 97 96 98 100 99 96   Weight 250 lb 3.2 oz 237 lb 244 lb 244 lb 244 lb 244 lb   Height 5' 6\" 5' 6\" 5' 6\" 5' 6\" 5' 6\" 5' 6\"   Body mass index 40.38 kg/m2 38.25 kg/m2 39.38 kg/m2 39.38 kg/m2 39.38 kg/m2 39.38 kg/m2   Pain Level - - - 7 - -   Some recent data might be hidden       Family History   Problem Relation Age of Onset    Diabetes Mother     High Blood Pressure Mother     Sleep Apnea Mother     Prostate Cancer Maternal Grandfather     Colon Polyps Neg Hx     Esophageal Cancer Neg Hx     Stomach Cancer Neg Hx     Rectal Cancer Neg Hx     Liver Cancer Neg Hx     Colon Cancer Neg Hx        Social History     Tobacco Use    Smoking status: Former Smoker     Packs/day: 0.25     Years: 15.00     Pack years: 3.75     Types: Cigarettes     Quit date: 2019     Years since quitting: 3.3    Smokeless tobacco: Never Used   Substance Use Topics    Alcohol use: Not Currently      Current Outpatient Medications on File Prior to Visit   Medication Sig Dispense Refill    omeprazole (PRILOSEC) 20 MG delayed release capsule Take 1 capsule by mouth 2 times daily (before meals) 60 capsule 3    fluticasone (FLONASE) 50 MCG/ACT nasal spray 2 sprays by Each Nostril route nightly 1 each 0    atorvastatin (LIPITOR) 40 MG tablet Take 1 tablet by mouth daily 90 tablet 3    ARIPiprazole (ABILIFY) 5 MG tablet Take 1 tablet by mouth daily 30 tablet 3    rOPINIRole (REQUIP) 0.5 MG tablet Take 1 q hs 30 tablet 5    cyclobenzaprine (FLEXERIL) 10 MG tablet Take 1 tablet by mouth 3 times daily as needed for Muscle spasms 90 tablet 1    lisinopril (PRINIVIL;ZESTRIL) 20 MG tablet daily       torsemide (DEMADEX) 20 MG tablet 20 mg 2 times daily       blood glucose test strips (ONE TOUCH ULTRA TEST) strip USE TO TEST TWICE DAILY 100 strip 3    Lancets MISC 1 each by Does not apply route 2 times daily 100 each 5    glucose monitoring kit (FREESTYLE) monitoring kit 1 kit by Does not apply route daily 1 kit 0     No current facility-administered medications on file prior to visit. No Known Allergies    Health Maintenance   Topic Date Due    Pneumococcal 0-64 years Vaccine (1 - PCV) Never done    HIV screen  Never done    Hepatitis C screen  Never done    A1C test (Diabetic or Prediabetic)  07/07/2021    Lipids  07/07/2021    COVID-19 Vaccine (3 - Booster for Moderna series) 10/10/2021    DTaP/Tdap/Td vaccine (1 - Tdap) 03/09/2023 (Originally [birth year]/10/1991)    Flu vaccine (Season Ended) 03/09/2023 (Originally 9/1/2022)    Depression Monitoring  03/07/2023    Colorectal Cancer Screen  09/24/2023    Hepatitis A vaccine  Aged Out    Hepatitis B vaccine  Aged Out    Hib vaccine  Aged Out    Meningococcal (ACWY) vaccine  Aged Out       Subjective:      Review of Systems   Constitutional: Negative. HENT: Negative. Eyes: Negative. Respiratory: Negative. Cardiovascular: Negative. Gastrointestinal: Negative. Endocrine: Negative. Genitourinary: Negative. Musculoskeletal: Negative. Allergic/Immunologic: Negative. Neurological: Negative. Hematological: Negative. Psychiatric/Behavioral: Positive for dysphoric mood. The patient is nervous/anxious. Objective:     Physical Exam  Vitals and nursing note reviewed. Constitutional:       Appearance: Normal appearance. He is well-developed. He is obese. HENT:      Head: Normocephalic. Right Ear: Tympanic membrane and external ear normal.      Left Ear: Tympanic membrane and external ear normal.      Nose: Nose normal.      Mouth/Throat:      Mouth: Mucous membranes are moist.   Eyes:      Pupils: Pupils are equal, round, and reactive to light.    Cardiovascular:      Rate and Rhythm: Normal rate and regular rhythm. Heart sounds: Normal heart sounds. Pulmonary:      Effort: Pulmonary effort is normal.      Breath sounds: Normal breath sounds. Abdominal:      General: Bowel sounds are normal.   Genitourinary:     Comments: Declined gu exam  Musculoskeletal:         General: Normal range of motion. Cervical back: Normal range of motion. Skin:     General: Skin is warm and dry. Capillary Refill: Capillary refill takes less than 2 seconds. Neurological:      General: No focal deficit present. Mental Status: He is alert and oriented to person, place, and time. Psychiatric:         Mood and Affect: Mood normal.         Behavior: Behavior normal.         Thought Content: Thought content normal.         Judgment: Judgment normal.       /82   Pulse 89   Temp 97.6 °F (36.4 °C)   Resp 16   Ht 5' 6\" (1.676 m)   Wt 250 lb 3.2 oz (113.5 kg)   SpO2 97%   BMI 40.38 kg/m²     Assessment:       Diagnosis Orders   1. Well adult on routine health check     2. Obstructive sleep apnea     3. Restless leg syndrome     4. Depression with anxiety     5. Weight gain  TSH    Hemoglobin A1C   6. Encounter for screening for lipid disorder  Lipid Panel   7. Screening for diabetes mellitus  Comprehensive Metabolic Panel    Hemoglobin A1C         Plan:   Is using his obstructive sleep apnea machine at night. He is no longer seeing pain management and not on the gabapentin but thinks he is doing okay without it. PDMP Monitoring:    Last PDMP Mil[place] as Reviewed [facility]):  Review User Review Instant Review Result            Urine Drug Screenings (1 yr)    No resulted procedures found. Medication Contract and Consent for Opioid Use Documents Filed      No documents found                 Patient given educational materials -see patient instructions. Discussed use, benefit, and side effects of prescribed medications. All patient questions answered. Pt voiced understanding.  Reviewed health maintenance. Instructed to continue currentmedications, diet and exercise. Patient agreed with treatment plan. Follow up as directed. MEDICATIONS:  Orders Placed This Encounter   Medications    buPROPion (WELLBUTRIN XL) 300 MG extended release tablet     Sig: Take 1 tablet by mouth every morning     Dispense:  30 tablet     Refill:  5    busPIRone (BUSPAR) 10 MG tablet     Sig: Take 1 tablet by mouth 2 times daily     Dispense:  60 tablet     Refill:  5         ORDERS:  Orders Placed This Encounter   Procedures    Comprehensive Metabolic Panel    Lipid Panel    TSH    Hemoglobin A1C       Follow-up:  Return in about 6 months (around 11/17/2022). PATIENT INSTRUCTIONS:  Patient Instructions   Work on diet and exercise try to lose 20 pounds over the summer  Increase your wellbutrin to 300mg at day  May use the buspar 10mg twice a day as needed for bout of anxiety  Continue the abilify  Have fasting labs done at [facility]. Electronically signed by DOROTHY Das CNP on 5/17/2022 at 2:25 PM    EMR Dragon/transcription disclaimer:  Much of thisencounter note is electronic transcription/translation of spoken language to printed texts. The electronic translation of spoken language may be erroneous, or at times, nonsensical words or phrases may be inadvertentlytranscribed.   Although I have reviewed the note for such errors, some may still exist.

## 2022-05-19 DIAGNOSIS — R63.5 WEIGHT GAIN: ICD-10-CM

## 2022-05-19 DIAGNOSIS — Z13.220 ENCOUNTER FOR SCREENING FOR LIPID DISORDER: ICD-10-CM

## 2022-05-19 DIAGNOSIS — Z13.1 SCREENING FOR DIABETES MELLITUS: ICD-10-CM

## 2022-08-10 NOTE — TELEPHONE ENCOUNTER
Requested Prescriptions     Pending Prescriptions Disp Refills    rOPINIRole (REQUIP) 0.5 MG tablet 30 tablet 5     Sig: Take 1 q hs       Last Office Visit: 5/6/21  Next Office Visit: none  Last Medication Refill: 8/27/21 with 5 RF

## 2022-08-11 RX ORDER — ROPINIROLE 0.5 MG/1
TABLET, FILM COATED ORAL
Qty: 30 TABLET | Refills: 5 | Status: SHIPPED | OUTPATIENT
Start: 2022-08-11

## 2022-09-06 RX ORDER — OMEPRAZOLE 20 MG/1
20 CAPSULE, DELAYED RELEASE ORAL
Qty: 60 CAPSULE | Refills: 3 | Status: SHIPPED | OUTPATIENT
Start: 2022-09-06

## 2022-09-15 RX ORDER — OMEPRAZOLE 20 MG/1
20 CAPSULE, DELAYED RELEASE ORAL
Qty: 60 CAPSULE | Refills: 3 | OUTPATIENT
Start: 2022-09-15

## 2022-10-03 RX ORDER — ARIPIPRAZOLE 5 MG/1
5 TABLET ORAL DAILY
Qty: 30 TABLET | Refills: 3 | Status: SHIPPED | OUTPATIENT
Start: 2022-10-03

## 2022-11-15 RX ORDER — BUSPIRONE HYDROCHLORIDE 10 MG/1
TABLET ORAL
Qty: 60 TABLET | Refills: 3 | Status: SHIPPED | OUTPATIENT
Start: 2022-11-15 | End: 2022-11-17 | Stop reason: SDUPTHER

## 2022-11-15 RX ORDER — BUSPIRONE HYDROCHLORIDE 10 MG/1
TABLET ORAL
Qty: 60 TABLET | Refills: 3 | OUTPATIENT
Start: 2022-11-15

## 2022-11-16 RX ORDER — ATORVASTATIN CALCIUM 40 MG/1
40 TABLET, FILM COATED ORAL DAILY
Qty: 90 TABLET | Refills: 1 | Status: SHIPPED | OUTPATIENT
Start: 2022-11-16

## 2022-11-17 ENCOUNTER — OFFICE VISIT (OUTPATIENT)
Dept: PRIMARY CARE CLINIC | Age: 52
End: 2022-11-17
Payer: MEDICAID

## 2022-11-17 VITALS
TEMPERATURE: 98.2 F | HEART RATE: 73 BPM | OXYGEN SATURATION: 97 % | DIASTOLIC BLOOD PRESSURE: 86 MMHG | BODY MASS INDEX: 41.08 KG/M2 | RESPIRATION RATE: 16 BRPM | HEIGHT: 66 IN | SYSTOLIC BLOOD PRESSURE: 130 MMHG | WEIGHT: 255.6 LBS

## 2022-11-17 DIAGNOSIS — J44.9 CHRONIC OBSTRUCTIVE PULMONARY DISEASE, UNSPECIFIED COPD TYPE (HCC): ICD-10-CM

## 2022-11-17 DIAGNOSIS — E78.5 HYPERLIPIDEMIA, UNSPECIFIED HYPERLIPIDEMIA TYPE: ICD-10-CM

## 2022-11-17 DIAGNOSIS — Z12.5 SCREENING PSA (PROSTATE SPECIFIC ANTIGEN): ICD-10-CM

## 2022-11-17 DIAGNOSIS — Z99.89 CPAP (CONTINUOUS POSITIVE AIRWAY PRESSURE) DEPENDENCE: ICD-10-CM

## 2022-11-17 DIAGNOSIS — G47.33 OBSTRUCTIVE SLEEP APNEA: ICD-10-CM

## 2022-11-17 DIAGNOSIS — Z13.220 ENCOUNTER FOR SCREENING FOR LIPID DISORDER: ICD-10-CM

## 2022-11-17 DIAGNOSIS — G25.81 RESTLESS LEG SYNDROME: ICD-10-CM

## 2022-11-17 DIAGNOSIS — G62.9 NEUROPATHY: ICD-10-CM

## 2022-11-17 DIAGNOSIS — F41.8 DEPRESSION WITH ANXIETY: Primary | ICD-10-CM

## 2022-11-17 DIAGNOSIS — M51.36 DDD (DEGENERATIVE DISC DISEASE), LUMBAR: ICD-10-CM

## 2022-11-17 DIAGNOSIS — Z13.1 SCREENING FOR DIABETES MELLITUS: ICD-10-CM

## 2022-11-17 DIAGNOSIS — M17.12 ARTHRITIS OF LEFT KNEE: ICD-10-CM

## 2022-11-17 PROBLEM — F17.200 SMOKER: Status: RESOLVED | Noted: 2020-01-27 | Resolved: 2022-11-17

## 2022-11-17 PROBLEM — R10.10 UPPER ABDOMINAL PAIN: Status: RESOLVED | Noted: 2020-10-06 | Resolved: 2022-11-17

## 2022-11-17 LAB
ALBUMIN SERPL-MCNC: 4.8 G/DL (ref 3.5–5.2)
ALP BLD-CCNC: 85 U/L (ref 40–130)
ALT SERPL-CCNC: 34 U/L (ref 5–41)
ANION GAP SERPL CALCULATED.3IONS-SCNC: 6 MMOL/L (ref 7–19)
AST SERPL-CCNC: 24 U/L (ref 5–40)
BILIRUB SERPL-MCNC: 1.5 MG/DL (ref 0.2–1.2)
BUN BLDV-MCNC: 13 MG/DL (ref 6–20)
CALCIUM SERPL-MCNC: 9.1 MG/DL (ref 8.6–10)
CHLORIDE BLD-SCNC: 101 MMOL/L (ref 98–111)
CHOLESTEROL, TOTAL: 159 MG/DL (ref 160–199)
CO2: 31 MMOL/L (ref 22–29)
CREAT SERPL-MCNC: 0.9 MG/DL (ref 0.5–1.2)
GFR SERPL CREATININE-BSD FRML MDRD: >60 ML/MIN/{1.73_M2}
GLUCOSE BLD-MCNC: 94 MG/DL (ref 74–109)
HBA1C MFR BLD: 6.1 % (ref 4–6)
HDLC SERPL-MCNC: 34 MG/DL (ref 55–121)
LDL CHOLESTEROL CALCULATED: 106 MG/DL
POTASSIUM SERPL-SCNC: 4.3 MMOL/L (ref 3.5–5)
PROSTATE SPECIFIC ANTIGEN: 0.9 NG/ML (ref 0–4)
SODIUM BLD-SCNC: 138 MMOL/L (ref 136–145)
TOTAL PROTEIN: 7 G/DL (ref 6.6–8.7)
TRIGL SERPL-MCNC: 95 MG/DL (ref 0–149)

## 2022-11-17 PROCEDURE — G8417 CALC BMI ABV UP PARAM F/U: HCPCS | Performed by: NURSE PRACTITIONER

## 2022-11-17 PROCEDURE — 3023F SPIROM DOC REV: CPT | Performed by: NURSE PRACTITIONER

## 2022-11-17 PROCEDURE — 1036F TOBACCO NON-USER: CPT | Performed by: NURSE PRACTITIONER

## 2022-11-17 PROCEDURE — 3017F COLORECTAL CA SCREEN DOC REV: CPT | Performed by: NURSE PRACTITIONER

## 2022-11-17 PROCEDURE — 99214 OFFICE O/P EST MOD 30 MIN: CPT | Performed by: NURSE PRACTITIONER

## 2022-11-17 PROCEDURE — 20610 DRAIN/INJ JOINT/BURSA W/O US: CPT | Performed by: NURSE PRACTITIONER

## 2022-11-17 PROCEDURE — G8484 FLU IMMUNIZE NO ADMIN: HCPCS | Performed by: NURSE PRACTITIONER

## 2022-11-17 PROCEDURE — G8427 DOCREV CUR MEDS BY ELIG CLIN: HCPCS | Performed by: NURSE PRACTITIONER

## 2022-11-17 RX ORDER — ARIPIPRAZOLE 10 MG/1
10 TABLET ORAL DAILY
Qty: 30 TABLET | Refills: 5 | Status: SHIPPED | OUTPATIENT
Start: 2022-11-17

## 2022-11-17 RX ORDER — BUSPIRONE HYDROCHLORIDE 10 MG/1
10 TABLET ORAL 2 TIMES DAILY
Qty: 60 TABLET | Refills: 3 | Status: SHIPPED | OUTPATIENT
Start: 2022-11-17

## 2022-11-17 ASSESSMENT — ENCOUNTER SYMPTOMS
BACK PAIN: 1
RESPIRATORY NEGATIVE: 1
GASTROINTESTINAL NEGATIVE: 1

## 2022-11-17 NOTE — PATIENT INSTRUCTIONS
Increase abilify  Can do referral to orthopedic in [place] if needed for knee  May do Physical therapy for back, if not improving back to Dr Melanie Duron. Consider counseling for depression.

## 2022-11-17 NOTE — PROGRESS NOTES
[facility] PHYSICIAN SERVICES  [facility]  [address]  [address] [address]  Dept: [phone]  Dept Fax: [phone]  Loc: [phone]    Derrick Soriano is a 50 y.o. male who presents today for his medical conditions/complaints as noted below. Derrick Soriano is c/o of 6 Month Follow-Up (Pt is fasting. ), Back Pain (Going on for along ti me, it comes and goes), and Knee Pain (Going on for 2 weeks)        HPI:     HPI   Chief Complaint   Patient presents with    6 Month Follow-Up     Pt is fasting. Back Pain     Going on for along ti me, it comes and goes    Knee Pain     Going on for 2 weeks   Left knee arthritis , xray last year at [facility]. Motrin helps. Worse when climb stairs or walking a lot. Never had injection. Back surgery 10 yrs ago in [place]. Had m ri last year. He saw Dr Paul Faulkner last year and was non surgical . He did pain management but didn't like it. He doesn't remember doing therapy. Blood sugar has been under control . He has quit his job as  due to knee and back. He would like to go up on abilify for mood. Denies suicidal or homicidal thoughts, has good family support system. Lab Results   Component Value Date/Time    GLUCOSE 94 11/17/2022 09:48 AM    GLUCOSE 105 09/04/2020 08:40 AM    GLUCOSE 119 07/07/2020 08:10 AM    LABA1C 6.1 11/17/2022 09:48 AM    LABA1C 5.7 07/07/2020 08:10 AM    LABA1C 6.0 12/20/2019 07:58 AM       Past Medical History:   Diagnosis Date    CHF (congestive heart failure) ([facility])     stage !     COPD (chronic obstructive pulmonary disease) ([facility])     COVID-19     CPAP (continuous positive airway pressure) dependence     10cm to 20cm    GERD (gastroesophageal reflux disease)     Hyperlipidemia     Hypertension     Obstructive sleep apnea     AHI: 84.1 night one and 67.3 night two, per HST, 1/2020    KEM (obstructive sleep apnea)       Past Surgical History:   Procedure Laterality Date    BACK SURGERY  2013    4 rods and 4 screws in [facility]    CHOLECYSTECTOMY, LAPAROSCOPIC N/A 11/17/2020    LAPAROSCOPIC CHOLECYSTECTOMY WITH CHOLANGIOGRAM WITH UMBILICAL HERNIA REPAIR performed by Aishwarya Moran MD at [address]  2008    [facility]- pt unsure of results or whom    COLONOSCOPY N/A 9/24/2020    Dr Olvin Henley prep, iInternal hemorrhoids-Grade 1, repeat with better prepif still symptomatic    ESOPHAGEAL DILATATION  9/24/2020    Dr Rajeev Celaya    HERNIA REPAIR      TONSILLECTOMY AND ADENOIDECTOMY      UPPER GASTROINTESTINAL ENDOSCOPY N/A 9/24/2020    Dr Jose J Cruz exam, empiric dil with #54F saucedo, NEG EoE, NEG celiac       Vitals 11/17/2022 5/17/2022 3/9/2022 3/7/2022 7/21/2021 9/15/6548   SYSTOLIC 259 [phone] 936   DIASTOLIC 86 82 86 84 94 84   Site - - - Left Upper Arm - Left Upper Arm   Position - - - Sitting - Sitting   Cuff Size - - - Large Adult - Large Adult   Pulse 73 89 97 77 75 84   Temp 98.2 97.6 97.1 97.6 97 98.2   Resp 16 16 18 - - -   SpO2 97 97 96 98 100 99   Weight 255 lb 9.6 oz 250 lb 3.2 oz 237 lb 244 lb 244 lb 244 lb   Height 5' 6\" 5' 6\" 5' 6\" 5' 6\" 5' 6\" 5' 6\"   Body mass index 41.25 kg/m2 40.38 kg/m2 38.25 kg/m2 39.38 kg/m2 39.38 kg/m2 39.38 kg/m2   Pain Level - - - - 7 -   Some recent data might be hidden       Family History   Problem Relation Age of Onset    Diabetes Mother     High Blood Pressure Mother     Sleep Apnea Mother     Prostate Cancer Maternal Grandfather     Colon Polyps Neg Hx     Esophageal Cancer Neg Hx     Stomach Cancer Neg Hx     Rectal Cancer Neg Hx     Liver Cancer Neg Hx     Colon Cancer Neg Hx        Social History     Tobacco Use    Smoking status: Former     Packs/day: 0.25     Years: 15.00     Pack years: 3.75     Types: Cigarettes     Quit date: 2019     Years since quitting: 3.8    Smokeless tobacco: Never   Substance Use Topics    Alcohol use: Not Currently      Current Outpatient Medications on File Prior to Visit   Medication Sig Dispense Refill    atorvastatin (LIPITOR) 40 MG tablet Take 1 tablet by mouth daily 90 tablet 1    omeprazole (PRILOSEC) 20 MG delayed release capsule Take 1 capsule by mouth 2 times daily (before meals) 60 capsule 3    rOPINIRole (REQUIP) 0.5 MG tablet Take 1 q hs 30 tablet 5    buPROPion (WELLBUTRIN XL) 300 MG extended release tablet Take 1 tablet by mouth every morning 30 tablet 5    fluticasone (FLONASE) 50 MCG/ACT nasal spray 2 sprays by Each Nostril route nightly 1 each 0    cyclobenzaprine (FLEXERIL) 10 MG tablet Take 1 tablet by mouth 3 times daily as needed for Muscle spasms 90 tablet 1    lisinopril (PRINIVIL;ZESTRIL) 20 MG tablet daily       torsemide (DEMADEX) 20 MG tablet 20 mg 2 times daily       blood glucose test strips (ONE TOUCH ULTRA TEST) strip USE TO TEST TWICE DAILY 100 strip 3    Lancets MISC 1 each by Does not apply route 2 times daily 100 each 5    glucose monitoring kit (FREESTYLE) monitoring kit 1 kit by Does not apply route daily 1 kit 0     No current facility-administered medications on file prior to visit. No Known Allergies    Health Maintenance   Topic Date Due    HIV screen  Never done    Hepatitis C screen  Never done    COVID-19 Vaccine (3 - Booster for Moderna series) 07/05/2021    DTaP/Tdap/Td vaccine (1 - Tdap) 03/09/2023 (Originally 11/10/1991)    Flu vaccine (1) 11/17/2023 (Originally 8/1/2022)    Shingles vaccine (1 of 2) 11/17/2023 (Originally 11/10/2022)    Pneumococcal 0-64 years Vaccine (1 - PCV) 11/17/2025 (Originally 11/10/1978)    Depression Monitoring  03/07/2023    Colorectal Cancer Screen  09/24/2023    A1C test (Diabetic or Prediabetic)  11/17/2023    Lipids  11/17/2023    Hepatitis A vaccine  Aged Out    Hib vaccine  Aged Out    Meningococcal (ACWY) vaccine  Aged Out       Subjective:      Review of Systems   Constitutional:  Positive for activity change. HENT: Negative. Respiratory: Negative. Gastrointestinal: Negative. Endocrine: Negative. Genitourinary: Negative. Musculoskeletal:  Positive for back pain. Left knee pain   Skin: Negative. Neurological: Negative. Psychiatric/Behavioral:  Positive for decreased concentration and dysphoric mood. The patient is nervous/anxious. Objective:     Physical Exam  Vitals and nursing note reviewed. Constitutional:       Appearance: Normal appearance. He is well-developed. HENT:      Head: Normocephalic. Right Ear: Tympanic membrane and external ear normal.      Left Ear: Tympanic membrane and external ear normal.      Nose: Nose normal.   Cardiovascular:      Rate and Rhythm: Normal rate and regular rhythm. Heart sounds: Normal heart sounds. Pulmonary:      Effort: Pulmonary effort is normal.      Breath sounds: Normal breath sounds. Skin:     General: Skin is warm and dry. Capillary Refill: Capillary refill takes less than 2 seconds. Neurological:      General: No focal deficit present. Mental Status: He is alert and oriented to person, place, and time. Psychiatric:         Mood and Affect: Mood normal.         Behavior: Behavior normal.         Thought Content: Thought content normal.         Judgment: Judgment normal.     /86   Pulse 73   Temp 98.2 °F (36.8 °C) (Temporal)   Resp 16   Ht 5' 6\" (1.676 m)   Wt 255 lb 9.6 oz (115.9 kg)   SpO2 97%   BMI 41.25 kg/m²   Procedure:left knee Injection  Indication: knee pain related to arthritis  Consent: Verbal consent obtained from the patient     Using sterile technique the area was cleansed with betadine. Ethyl Chloride was used for topical anesthetic. Using Kenalog 40mg and lidocaine 1% plain 2ml, 18 ga 1.5 inch needle the knee was entered medially , without resistance. Aspirated with no return of blood and the medication was injected into the joint space. Pt tolerated well. Pressure held for 2 min after injection.   I discussed with the patient that she may have some temporary relief followed by 1-2 days of worsening pain. She should get some relief over the next few days from the arthritic pain. She may continue to take anti-inflammatories for the pain. Assessment:       Diagnosis Orders   1. Depression with anxiety  ARIPiprazole (ABILIFY) 10 MG tablet      2. Restless leg syndrome        3. Chronic obstructive pulmonary disease, unspecified COPD type ([facility] [address]        4. Neuropathy        5. DDD (degenerative disc disease), lumbar  External Referral To Physical Therapy      6. CPAP (continuous positive airway pressure) dependence        7. Obstructive sleep apnea        8. Hyperlipidemia, unspecified hyperlipidemia type  Lipid Panel      9. Encounter for screening for lipid disorder  Lipid Panel      10. Screening for diabetes mellitus  Comprehensive Metabolic Panel    Hemoglobin A1C      11. Screening PSA (prostate specific antigen)  PSA Screening      12. Arthritis of left knee  [place] ARTHROCENTESIS ASPIR&/INJ MAJOR JT/BURSA W/O US            Plan:   More than 50% of the time was spent counseling and coordinating care for a total time of 35 min face to face. PDMP Monitoring:    Last PDMP Milo as Reviewed:  Review User Review Instant Review Result            Urine Drug Screenings (1 yr)    No resulted procedures found. Medication Contract and Consent for Opioid Use Documents Filed        No documents found                     Patient given educational materials -see patient instructions. Discussed use, benefit, and side effects of prescribed medications. All patient questions answered. Pt voiced understanding. Reviewed health maintenance. Instructed to continue currentmedications, diet and exercise. Patient agreed with treatment plan. Follow up as directed.    MEDICATIONS:  Orders Placed This Encounter   Medications    ARIPiprazole (ABILIFY) 10 MG tablet     Sig: Take 1 tablet by mouth daily , increase in dose     Dispense:  30 tablet     Refill:  5         ORDERS:  Orders Placed This Encounter   Procedures    PSA Screening    Comprehensive Metabolic Panel    Lipid Panel    Hemoglobin A1C    External Referral To Physical Therapy    [place] ARTHROCENTESIS ASPIR&/INJ MAJOR JT/BURSA W/O US       Follow-up:  Return in about 6 months (around 5/17/2023) for annual .    PATIENT INSTRUCTIONS:  There are no Patient Instructions on file for this visit. Electronically signed by DOROTHY Peck CNP on 11/17/2022 at 5:30 PM    EMR Dragon/transcription disclaimer:  Much of thisencounter note is electronic transcription/translation of spoken language to printed texts. The electronic translation of spoken language may be erroneous, or at times, nonsensical words or phrases may be inadvertentlytranscribed.   Although I have reviewed the note for such errors, some may still exist.

## 2022-11-22 RX ORDER — BUPROPION HYDROCHLORIDE 300 MG/1
TABLET ORAL
Qty: 21 TABLET | Refills: 0 | Status: SHIPPED | OUTPATIENT
Start: 2022-11-22

## 2022-11-22 RX ORDER — OMEPRAZOLE 20 MG/1
20 CAPSULE, DELAYED RELEASE ORAL
Qty: 60 CAPSULE | Refills: 3 | Status: SHIPPED | OUTPATIENT
Start: 2022-11-22

## 2022-11-30 ENCOUNTER — TELEPHONE (OUTPATIENT)
Dept: PRIMARY CARE CLINIC | Age: 52
End: 2022-11-30

## 2022-11-30 DIAGNOSIS — M17.12 ARTHRITIS OF LEFT KNEE: Primary | ICD-10-CM

## 2022-11-30 DIAGNOSIS — M25.462 SWELLING OF JOINT OF LEFT KNEE: ICD-10-CM

## 2022-11-30 NOTE — TELEPHONE ENCOUNTER
42 Marilee Sen called and said that insurance is not going to approve MRI of left knee. Patient needs at least 4-6 weeks of PT and a recent x-ray of his left knee. Patient would like PT and x-ray done at [facility]. We will cancel MRI until these are completed.

## 2022-11-30 NOTE — TELEPHONE ENCOUNTER
At the referral in for physical therapy and the x-ray order if you will fax it to [facility] purchase

## 2022-12-01 DIAGNOSIS — M25.462 SWELLING OF JOINT OF LEFT KNEE: ICD-10-CM

## 2022-12-01 DIAGNOSIS — M17.12 ARTHRITIS OF LEFT KNEE: ICD-10-CM

## 2022-12-15 RX ORDER — BUPROPION HYDROCHLORIDE 300 MG/1
300 TABLET ORAL EVERY MORNING
Qty: 90 TABLET | Refills: 2 | Status: SHIPPED | OUTPATIENT
Start: 2022-12-15 | End: 2023-03-15

## 2023-04-05 RX ORDER — OMEPRAZOLE 20 MG/1
20 CAPSULE, DELAYED RELEASE ORAL
Qty: 60 CAPSULE | Refills: 3 | Status: SHIPPED | OUTPATIENT
Start: 2023-04-05

## 2023-04-19 DIAGNOSIS — F41.8 DEPRESSION WITH ANXIETY: ICD-10-CM

## 2023-04-20 RX ORDER — ARIPIPRAZOLE 10 MG/1
TABLET ORAL
Qty: 30 TABLET | Refills: 0 | Status: SHIPPED | OUTPATIENT
Start: 2023-04-20

## 2023-04-27 ENCOUNTER — HOSPITAL ENCOUNTER (OUTPATIENT)
Dept: CT IMAGING | Age: 53
Discharge: HOME | End: 2023-04-27
Payer: MEDICAID

## 2023-04-27 ENCOUNTER — OFFICE VISIT (OUTPATIENT)
Dept: PRIMARY CARE CLINIC | Age: 53
End: 2023-04-27
Payer: MEDICAID

## 2023-04-27 VITALS
DIASTOLIC BLOOD PRESSURE: 88 MMHG | WEIGHT: 255.4 LBS | RESPIRATION RATE: 16 BRPM | TEMPERATURE: 97.4 F | HEIGHT: 66 IN | SYSTOLIC BLOOD PRESSURE: 136 MMHG | HEART RATE: 96 BPM | OXYGEN SATURATION: 96 % | BODY MASS INDEX: 41.05 KG/M2

## 2023-04-27 DIAGNOSIS — M54.50 RIGHT-SIDED LOW BACK PAIN WITHOUT SCIATICA, UNSPECIFIED CHRONICITY: ICD-10-CM

## 2023-04-27 DIAGNOSIS — R31.9 HEMATURIA, UNSPECIFIED TYPE: Primary | ICD-10-CM

## 2023-04-27 DIAGNOSIS — N20.0 KIDNEY STONE: ICD-10-CM

## 2023-04-27 DIAGNOSIS — N50.812 LEFT TESTICULAR PAIN: ICD-10-CM

## 2023-04-27 DIAGNOSIS — R31.9 HEMATURIA, UNSPECIFIED TYPE: ICD-10-CM

## 2023-04-27 LAB
BILIRUBIN, POC: ABNORMAL
BLOOD URINE, POC: ABNORMAL
CLARITY, POC: ABNORMAL
COLOR, POC: YELLOW
GLUCOSE URINE, POC: ABNORMAL
KETONES, POC: ABNORMAL
LEUKOCYTE EST, POC: ABNORMAL
NITRITE, POC: POSITIVE
PH, POC: 6
PROTEIN, POC: 100
SPECIFIC GRAVITY, POC: 1030
UROBILINOGEN, POC: 4

## 2023-04-27 PROCEDURE — 3017F COLORECTAL CA SCREEN DOC REV: CPT | Performed by: FAMILY MEDICINE

## 2023-04-27 PROCEDURE — 81002 URINALYSIS NONAUTO W/O SCOPE: CPT | Performed by: FAMILY MEDICINE

## 2023-04-27 PROCEDURE — 74176 CT ABD & PELVIS W/O CONTRAST: CPT

## 2023-04-27 PROCEDURE — G8427 DOCREV CUR MEDS BY ELIG CLIN: HCPCS | Performed by: FAMILY MEDICINE

## 2023-04-27 PROCEDURE — G8417 CALC BMI ABV UP PARAM F/U: HCPCS | Performed by: FAMILY MEDICINE

## 2023-04-27 PROCEDURE — 1036F TOBACCO NON-USER: CPT | Performed by: FAMILY MEDICINE

## 2023-04-27 PROCEDURE — 99213 OFFICE O/P EST LOW 20 MIN: CPT | Performed by: FAMILY MEDICINE

## 2023-04-27 RX ORDER — METOPROLOL SUCCINATE 50 MG/1
50 TABLET, EXTENDED RELEASE ORAL DAILY
COMMUNITY
Start: 2023-04-26

## 2023-04-27 RX ORDER — CIPROFLOXACIN 250 MG/1
250 TABLET, FILM COATED ORAL 2 TIMES DAILY
Qty: 14 TABLET | Refills: 0 | Status: SHIPPED | OUTPATIENT
Start: 2023-04-27 | End: 2023-05-04

## 2023-04-27 RX ORDER — TAMSULOSIN HYDROCHLORIDE 0.4 MG/1
0.4 CAPSULE ORAL DAILY
Qty: 30 CAPSULE | Refills: 5 | Status: SHIPPED | OUTPATIENT
Start: 2023-04-27

## 2023-04-27 SDOH — ECONOMIC STABILITY: FOOD INSECURITY: WITHIN THE PAST 12 MONTHS, THE FOOD YOU BOUGHT JUST DIDN'T LAST AND YOU DIDN'T HAVE MONEY TO GET MORE.: NEVER TRUE

## 2023-04-27 SDOH — ECONOMIC STABILITY: FOOD INSECURITY: WITHIN THE PAST 12 MONTHS, YOU WORRIED THAT YOUR FOOD WOULD RUN OUT BEFORE YOU GOT MONEY TO BUY MORE.: NEVER TRUE

## 2023-04-27 SDOH — ECONOMIC STABILITY: TRANSPORTATION INSECURITY
IN THE PAST 12 MONTHS, HAS LACK OF TRANSPORTATION KEPT YOU FROM MEETINGS, WORK, OR FROM GETTING THINGS NEEDED FOR DAILY LIVING?: NO

## 2023-04-27 SDOH — ECONOMIC STABILITY: HOUSING INSECURITY
IN THE LAST 12 MONTHS, WAS THERE A TIME WHEN YOU DID NOT HAVE A STEADY PLACE TO SLEEP OR SLEPT IN A SHELTER (INCLUDING NOW)?: NO

## 2023-04-27 SDOH — ECONOMIC STABILITY: INCOME INSECURITY: HOW HARD IS IT FOR YOU TO PAY FOR THE VERY BASICS LIKE FOOD, HOUSING, MEDICAL CARE, AND HEATING?: NOT HARD AT ALL

## 2023-04-27 ASSESSMENT — ENCOUNTER SYMPTOMS
ABDOMINAL PAIN: 0
COLOR CHANGE: 0
DIARRHEA: 0
RHINORRHEA: 0
COUGH: 0
VOMITING: 0
CONSTIPATION: 0
NAUSEA: 0

## 2023-04-27 ASSESSMENT — PATIENT HEALTH QUESTIONNAIRE - PHQ9
SUM OF ALL RESPONSES TO PHQ9 QUESTIONS 1 & 2: 0
10. IF YOU CHECKED OFF ANY PROBLEMS, HOW DIFFICULT HAVE THESE PROBLEMS MADE IT FOR YOU TO DO YOUR WORK, TAKE CARE OF THINGS AT HOME, OR GET ALONG WITH OTHER PEOPLE: 0
4. FEELING TIRED OR HAVING LITTLE ENERGY: 0
SUM OF ALL RESPONSES TO PHQ QUESTIONS 1-9: 0
6. FEELING BAD ABOUT YOURSELF - OR THAT YOU ARE A FAILURE OR HAVE LET YOURSELF OR YOUR FAMILY DOWN: 0
8. MOVING OR SPEAKING SO SLOWLY THAT OTHER PEOPLE COULD HAVE NOTICED. OR THE OPPOSITE, BEING SO FIGETY OR RESTLESS THAT YOU HAVE BEEN MOVING AROUND A LOT MORE THAN USUAL: 0
9. THOUGHTS THAT YOU WOULD BE BETTER OFF DEAD, OR OF HURTING YOURSELF: 0
1. LITTLE INTEREST OR PLEASURE IN DOING THINGS: 0
2. FEELING DOWN, DEPRESSED OR HOPELESS: 0
SUM OF ALL RESPONSES TO PHQ QUESTIONS 1-9: 0
5. POOR APPETITE OR OVEREATING: 0
3. TROUBLE FALLING OR STAYING ASLEEP: 0
SUM OF ALL RESPONSES TO PHQ QUESTIONS 1-9: 0
7. TROUBLE CONCENTRATING ON THINGS, SUCH AS READING THE NEWSPAPER OR WATCHING TELEVISION: 0
SUM OF ALL RESPONSES TO PHQ QUESTIONS 1-9: 0

## 2023-04-27 NOTE — PROGRESS NOTES
SUBJECTIVE:    Patient ID: Danie Hay is a 50 y.o. male. HPI:   Patient is seen today for complaints of blood in his urine as well as right flank pain and testicular pain. He states that this been going on for the last couple of days. He states that he had the blood last night. He states that he has not run fever or had chills. He denies any nausea or vomiting. He denies any difficulty with urination. He states that he is drinking plenty of water. He has never had a kidney stone previously. Past Medical History:   Diagnosis Date    CHF (congestive heart failure) ([facility] [address]     stage ! COPD (chronic obstructive pulmonary disease) ([facility])     COVID-19     CPAP (continuous positive airway pressure) dependence     10cm to 20cm    GERD (gastroesophageal reflux disease)     Hyperlipidemia     Hypertension     Obstructive sleep apnea     AHI: 84.1 night one and 67.3 night two, per HST, 1/2020    KEM (obstructive sleep apnea)       Current Outpatient Medications on File Prior to Visit   Medication Sig Dispense Refill    metoprolol succinate (TOPROL XL) 50 MG extended release tablet Take 1 tablet by mouth daily      ARIPiprazole (ABILIFY) 10 MG tablet TAKE 1 TABLET DAILY.  30 tablet 0    omeprazole (PRILOSEC) 20 MG delayed release capsule Take 1 capsule by mouth 2 times daily (before meals) 60 capsule 3    buPROPion (WELLBUTRIN XL) 300 MG extended release tablet Take 1 tablet by mouth every morning 90 tablet 2    busPIRone (BUSPAR) 10 MG tablet Take 1 tablet by mouth in the morning and at bedtime 60 tablet 3    atorvastatin (LIPITOR) 40 MG tablet Take 1 tablet by mouth daily 90 tablet 1    rOPINIRole (REQUIP) 0.5 MG tablet Take 1 q hs 30 tablet 5    fluticasone (FLONASE) 50 MCG/ACT nasal spray 2 sprays by Each Nostril route nightly 1 each 0    cyclobenzaprine (FLEXERIL) 10 MG tablet Take 1 tablet by mouth 3 times daily as needed for Muscle spasms 90 tablet 1    lisinopril (PRINIVIL;ZESTRIL) 20 MG tablet Take 1

## 2023-04-29 LAB
BACTERIA UR CULT: ABNORMAL
BACTERIA UR CULT: ABNORMAL
ORGANISM: ABNORMAL

## 2023-05-10 NOTE — TELEPHONE ENCOUNTER
Requested Prescriptions     Pending Prescriptions Disp Refills    rOPINIRole (REQUIP) 0.5 MG tablet 30 tablet 5     Sig: Take 1 q hs       Last Office Visit: 4/27/2020  Next Office Visit: Visit date not found  Last Medication Refill: 8/11/2022 with 5 RF

## 2023-05-11 ENCOUNTER — TELEPHONE (OUTPATIENT)
Dept: NEUROLOGY | Age: 53
End: 2023-05-11

## 2023-05-11 RX ORDER — ROPINIROLE 0.5 MG/1
TABLET, FILM COATED ORAL
Qty: 30 TABLET | Refills: 5 | Status: SHIPPED | OUTPATIENT
Start: 2023-05-11

## 2023-05-11 NOTE — TELEPHONE ENCOUNTER
Called Ketan and canceled refills per Manasa Roper due to patient has not been seen in the office since  08/28/2020 and she stated patient needed a follow up before refills was given. Patient has been notified of this information and to get an appointment scheduled with our office.

## 2023-05-18 ENCOUNTER — OFFICE VISIT (OUTPATIENT)
Dept: PRIMARY CARE CLINIC | Age: 53
End: 2023-05-18

## 2023-05-18 ENCOUNTER — ANCILLARY PROCEDURE (OUTPATIENT)
Dept: PRIMARY CARE CLINIC | Age: 53
End: 2023-05-18
Payer: MEDICAID

## 2023-05-18 VITALS
HEIGHT: 66 IN | WEIGHT: 253 LBS | DIASTOLIC BLOOD PRESSURE: 82 MMHG | BODY MASS INDEX: 40.66 KG/M2 | SYSTOLIC BLOOD PRESSURE: 130 MMHG | TEMPERATURE: 96.9 F | HEART RATE: 79 BPM | OXYGEN SATURATION: 97 % | RESPIRATION RATE: 16 BRPM

## 2023-05-18 DIAGNOSIS — M25.561 ARTHRALGIA OF RIGHT KNEE: ICD-10-CM

## 2023-05-18 DIAGNOSIS — G47.33 OBSTRUCTIVE SLEEP APNEA: ICD-10-CM

## 2023-05-18 DIAGNOSIS — Z99.89 CPAP (CONTINUOUS POSITIVE AIRWAY PRESSURE) DEPENDENCE: ICD-10-CM

## 2023-05-18 DIAGNOSIS — Z00.00 WELL ADULT ON ROUTINE HEALTH CHECK: Primary | ICD-10-CM

## 2023-05-18 DIAGNOSIS — G89.29 CHRONIC PAIN OF RIGHT KNEE: ICD-10-CM

## 2023-05-18 DIAGNOSIS — F41.8 DEPRESSION WITH ANXIETY: ICD-10-CM

## 2023-05-18 DIAGNOSIS — M25.561 CHRONIC PAIN OF RIGHT KNEE: ICD-10-CM

## 2023-05-18 DIAGNOSIS — R73.09 ELEVATED GLUCOSE: ICD-10-CM

## 2023-05-18 DIAGNOSIS — M51.36 DDD (DEGENERATIVE DISC DISEASE), LUMBAR: ICD-10-CM

## 2023-05-18 DIAGNOSIS — E78.5 HYPERLIPIDEMIA, UNSPECIFIED HYPERLIPIDEMIA TYPE: ICD-10-CM

## 2023-05-18 LAB
ALBUMIN SERPL-MCNC: 4.4 G/DL (ref 3.5–5.2)
ALP SERPL-CCNC: 85 U/L (ref 40–130)
ALT SERPL-CCNC: 37 U/L (ref 5–41)
ANION GAP SERPL CALCULATED.3IONS-SCNC: 11 MMOL/L (ref 7–19)
AST SERPL-CCNC: 31 U/L (ref 5–40)
BILIRUB SERPL-MCNC: 1.4 MG/DL (ref 0.2–1.2)
BUN SERPL-MCNC: 12 MG/DL (ref 6–20)
CALCIUM SERPL-MCNC: 9.7 MG/DL (ref 8.6–10)
CHLORIDE SERPL-SCNC: 103 MMOL/L (ref 98–111)
CHOLEST SERPL-MCNC: 195 MG/DL (ref 160–199)
CO2 SERPL-SCNC: 27 MMOL/L (ref 22–29)
CREAT SERPL-MCNC: 0.9 MG/DL (ref 0.5–1.2)
GLUCOSE SERPL-MCNC: 111 MG/DL (ref 74–109)
HBA1C MFR BLD: 6.4 % (ref 4–6)
HDLC SERPL-MCNC: 29 MG/DL (ref 55–121)
LDLC SERPL CALC-MCNC: 132 MG/DL
POTASSIUM SERPL-SCNC: 4.6 MMOL/L (ref 3.5–5)
PROT SERPL-MCNC: 7.5 G/DL (ref 6.6–8.7)
SODIUM SERPL-SCNC: 141 MMOL/L (ref 136–145)
TRIGL SERPL-MCNC: 170 MG/DL (ref 0–149)

## 2023-05-18 PROCEDURE — 73562 X-RAY EXAM OF KNEE 3: CPT | Performed by: NURSE PRACTITIONER

## 2023-05-18 RX ORDER — ROPINIROLE 0.5 MG/1
TABLET, FILM COATED ORAL
Qty: 60 TABLET | Refills: 5 | Status: SHIPPED | OUTPATIENT
Start: 2023-05-18

## 2023-05-18 RX ORDER — BUSPIRONE HYDROCHLORIDE 10 MG/1
10 TABLET ORAL 2 TIMES DAILY
Qty: 180 TABLET | Refills: 3 | Status: SHIPPED | OUTPATIENT
Start: 2023-05-18

## 2023-05-18 RX ORDER — ATORVASTATIN CALCIUM 40 MG/1
40 TABLET, FILM COATED ORAL DAILY
Qty: 90 TABLET | Refills: 1 | Status: SHIPPED | OUTPATIENT
Start: 2023-05-18

## 2023-05-18 ASSESSMENT — ENCOUNTER SYMPTOMS
EYES NEGATIVE: 1
ALLERGIC/IMMUNOLOGIC NEGATIVE: 1
GASTROINTESTINAL NEGATIVE: 1
RESPIRATORY NEGATIVE: 1

## 2023-05-18 NOTE — PATIENT INSTRUCTIONS
Xray today of knee  Could send to orthopedic any time in [place]  Increase restless leg med to 2 pills at night  Wear cpap nightly.    If blood sugar is up may need to start monitor   Work On diet and exercise

## 2023-05-18 NOTE — PROGRESS NOTES
[facility] PHYSICIAN SERVICES  [facility]  [address]  [address] [address]  Dept: [phone]  Dept Fax: [phone]  Loc: [phone]    Isak Weller is a 50 y.o. male who presents today for his medical conditions/complaints as noted below. Isak Weller is c/o of Annual Exam (Patient presents today for 6 month follow up and his annual physical. Patient has been fasting for labs. He also states that his right knee has been hurting him. Patient denies injury.)        HPI:     HPI   Chief Complaint   Patient presents with    Annual Exam     Patient presents today for 6 month follow up and his annual physical. Patient has been fasting for labs. He also states that his right knee has been hurting him. Patient denies injury. No injury to knee, known arthritis left knee. Taking motrin with relief. He has had elevated blood sugar but he does not monitor his blood sugar at home. He has had an injection in the left knee and physical therapy in the left knee before but the right knee just started bothering him in the last few months. He is using his CPAP every night. He would like to go up on the restless leg medicine this is usually what bothers him. Past Medical History:   Diagnosis Date    Anxiety     CHF (congestive heart failure) ([facility] [address]     stage !     Chronic back pain     COPD (chronic obstructive pulmonary disease) ([facility])     COVID-19     CPAP (continuous positive airway pressure) dependence     10cm to 20cm    Depression     GERD (gastroesophageal reflux disease)     Hearing loss     Hyperlipidemia     Hypertension     Obesity     Obstructive sleep apnea     AHI: 84.1 night one and 67.3 night two, per HST, 1/2020    KEM (obstructive sleep apnea)     Restless legs syndrome       Past Surgical History:   Procedure Laterality Date    BACK SURGERY  2013    4 rods and 4 screws in back-[facility]ari brain and spine    CHOLECYSTECTOMY, LAPAROSCOPIC N/A 11/17/2020    LAPAROSCOPIC CHOLECYSTECTOMY

## 2023-05-22 ENCOUNTER — PATIENT MESSAGE (OUTPATIENT)
Dept: PRIMARY CARE CLINIC | Age: 53
End: 2023-05-22

## 2023-05-22 DIAGNOSIS — G89.29 CHRONIC PAIN OF RIGHT KNEE: Primary | ICD-10-CM

## 2023-05-22 DIAGNOSIS — M25.561 CHRONIC PAIN OF RIGHT KNEE: Primary | ICD-10-CM

## 2023-05-22 NOTE — TELEPHONE ENCOUNTER
From: Alcides Mayberry  To: Victoria Dance  Sent: 5/22/2023 8:15 AM CDT  Subject: Right knee     My right knee is not any better from the shot I got Thursday. I was out in the yard weeding and it popped and I then couldn't put any pressure on it and I went to the er at [facility] this morning. I am wanting you to refer me to the orthopedic doctor in [place] like we talked about at my appointment. The er says I sprung my knee but I don't understand because I was just standing there pulling weeds not moving my knees.

## 2023-06-26 DIAGNOSIS — M25.462 SWELLING OF JOINT OF LEFT KNEE: ICD-10-CM

## 2023-06-26 DIAGNOSIS — M17.12 ARTHRITIS OF LEFT KNEE: ICD-10-CM

## 2023-07-24 DIAGNOSIS — F41.8 DEPRESSION WITH ANXIETY: ICD-10-CM

## 2023-07-24 RX ORDER — OMEPRAZOLE 20 MG/1
20 CAPSULE, DELAYED RELEASE ORAL
Qty: 60 CAPSULE | Refills: 0 | Status: SHIPPED | OUTPATIENT
Start: 2023-07-24

## 2023-07-24 RX ORDER — ARIPIPRAZOLE 10 MG/1
TABLET ORAL
Qty: 30 TABLET | Refills: 0 | Status: SHIPPED | OUTPATIENT
Start: 2023-07-24

## 2023-08-29 ENCOUNTER — PATIENT MESSAGE (OUTPATIENT)
Dept: PRIMARY CARE CLINIC | Age: 53
End: 2023-08-29

## 2023-08-29 DIAGNOSIS — F41.8 DEPRESSION WITH ANXIETY: ICD-10-CM

## 2023-08-29 RX ORDER — LISINOPRIL 20 MG/1
20 TABLET ORAL DAILY
Qty: 90 TABLET | Refills: 0 | Status: SHIPPED | OUTPATIENT
Start: 2023-08-29

## 2023-08-29 RX ORDER — ARIPIPRAZOLE 10 MG/1
TABLET ORAL
Qty: 90 TABLET | Refills: 5 | Status: SHIPPED | OUTPATIENT
Start: 2023-08-29

## 2023-08-29 NOTE — TELEPHONE ENCOUNTER
From: Gissel Herrera  To: Chaparrotorie Gutiérrez  Sent: 8/29/2023 8:41 AM CDT  Subject: Lisinipril    Can I have a refill on my lisinipril please

## 2023-10-04 RX ORDER — OMEPRAZOLE 20 MG/1
20 CAPSULE, DELAYED RELEASE ORAL
Qty: 60 CAPSULE | Refills: 0 | Status: SHIPPED | OUTPATIENT
Start: 2023-10-04

## 2023-10-26 RX ORDER — OMEPRAZOLE 20 MG/1
20 CAPSULE, DELAYED RELEASE ORAL
Qty: 60 CAPSULE | Refills: 3 | Status: SHIPPED | OUTPATIENT
Start: 2023-10-26

## 2023-11-01 ENCOUNTER — TELEPHONE (OUTPATIENT)
Dept: PRIMARY CARE CLINIC | Age: 53
End: 2023-11-01

## 2023-11-01 DIAGNOSIS — F41.9 ANXIETY: ICD-10-CM

## 2023-11-01 DIAGNOSIS — G47.00 INSOMNIA, UNSPECIFIED TYPE: Primary | ICD-10-CM

## 2023-11-01 RX ORDER — ALPRAZOLAM 0.5 MG/1
0.5 TABLET ORAL 2 TIMES DAILY PRN
Qty: 60 TABLET | Refills: 0 | Status: SHIPPED | OUTPATIENT
Start: 2023-11-01 | End: 2023-12-01

## 2023-11-01 RX ORDER — ZOLPIDEM TARTRATE 10 MG/1
10 TABLET ORAL NIGHTLY PRN
Qty: 14 TABLET | Refills: 0 | Status: SHIPPED | OUTPATIENT
Start: 2023-11-01 | End: 2023-11-15

## 2023-11-01 NOTE — TELEPHONE ENCOUNTER
Son  car accident not sleeping he was here with his wife did review his Coryayaz Wade he does not have a contract but I am going to see him back in 2 weeks and we will see if he needs these long-term.   I did encourage him to do counseling with his  or grief counseling with a professional

## 2023-11-28 ENCOUNTER — OFFICE VISIT (OUTPATIENT)
Dept: PRIMARY CARE CLINIC | Age: 53
End: 2023-11-28
Payer: MEDICAID

## 2023-11-28 VITALS
DIASTOLIC BLOOD PRESSURE: 90 MMHG | BODY MASS INDEX: 42.27 KG/M2 | HEART RATE: 67 BPM | SYSTOLIC BLOOD PRESSURE: 120 MMHG | TEMPERATURE: 98 F | OXYGEN SATURATION: 97 % | RESPIRATION RATE: 18 BRPM | HEIGHT: 66 IN | WEIGHT: 263 LBS

## 2023-11-28 DIAGNOSIS — G47.00 INSOMNIA, UNSPECIFIED TYPE: ICD-10-CM

## 2023-11-28 DIAGNOSIS — F41.9 ANXIETY: ICD-10-CM

## 2023-11-28 DIAGNOSIS — R73.09 ELEVATED GLUCOSE: Primary | ICD-10-CM

## 2023-11-28 DIAGNOSIS — E11.9 TYPE 2 DIABETES MELLITUS WITHOUT COMPLICATION, WITHOUT LONG-TERM CURRENT USE OF INSULIN (HCC): ICD-10-CM

## 2023-11-28 DIAGNOSIS — F41.8 DEPRESSION WITH ANXIETY: ICD-10-CM

## 2023-11-28 LAB
ALBUMIN SERPL-MCNC: 4.5 G/DL (ref 3.5–5.2)
ALP SERPL-CCNC: 96 U/L (ref 40–130)
ALT SERPL-CCNC: 43 U/L (ref 5–41)
ANION GAP SERPL CALCULATED.3IONS-SCNC: 6 MMOL/L (ref 7–19)
AST SERPL-CCNC: 32 U/L (ref 5–40)
BILIRUB SERPL-MCNC: 2.1 MG/DL (ref 0.2–1.2)
BUN SERPL-MCNC: 15 MG/DL (ref 6–20)
CALCIUM SERPL-MCNC: 9.4 MG/DL (ref 8.6–10)
CHLORIDE SERPL-SCNC: 102 MMOL/L (ref 98–111)
CO2 SERPL-SCNC: 33 MMOL/L (ref 22–29)
CREAT SERPL-MCNC: 1 MG/DL (ref 0.5–1.2)
GLUCOSE SERPL-MCNC: 140 MG/DL (ref 74–109)
HBA1C MFR BLD: 7.4 % (ref 4–6)
POTASSIUM SERPL-SCNC: 4 MMOL/L (ref 3.5–5)
PROT SERPL-MCNC: 6.8 G/DL (ref 6.6–8.7)
SODIUM SERPL-SCNC: 141 MMOL/L (ref 136–145)

## 2023-11-28 PROCEDURE — G8417 CALC BMI ABV UP PARAM F/U: HCPCS | Performed by: NURSE PRACTITIONER

## 2023-11-28 PROCEDURE — 99214 OFFICE O/P EST MOD 30 MIN: CPT | Performed by: NURSE PRACTITIONER

## 2023-11-28 PROCEDURE — 3017F COLORECTAL CA SCREEN DOC REV: CPT | Performed by: NURSE PRACTITIONER

## 2023-11-28 PROCEDURE — G8427 DOCREV CUR MEDS BY ELIG CLIN: HCPCS | Performed by: NURSE PRACTITIONER

## 2023-11-28 PROCEDURE — 1036F TOBACCO NON-USER: CPT | Performed by: NURSE PRACTITIONER

## 2023-11-28 PROCEDURE — G8484 FLU IMMUNIZE NO ADMIN: HCPCS | Performed by: NURSE PRACTITIONER

## 2023-11-28 RX ORDER — BLOOD-GLUCOSE METER
1 KIT MISCELLANEOUS 2 TIMES DAILY
Qty: 1 KIT | Refills: 0 | Status: SHIPPED | OUTPATIENT
Start: 2023-11-28

## 2023-11-28 RX ORDER — BUPROPION HYDROCHLORIDE 300 MG/1
TABLET ORAL
Qty: 90 TABLET | Refills: 4 | Status: SHIPPED | OUTPATIENT
Start: 2023-11-28

## 2023-11-28 RX ORDER — OMEPRAZOLE 20 MG/1
20 CAPSULE, DELAYED RELEASE ORAL
Qty: 180 CAPSULE | Refills: 3 | Status: SHIPPED | OUTPATIENT
Start: 2023-11-28

## 2023-11-28 RX ORDER — ATORVASTATIN CALCIUM 40 MG/1
40 TABLET, FILM COATED ORAL DAILY
Qty: 90 TABLET | Refills: 3 | Status: SHIPPED | OUTPATIENT
Start: 2023-11-28

## 2023-11-28 RX ORDER — LANCETS 30 GAUGE
1 EACH MISCELLANEOUS 2 TIMES DAILY
Qty: 100 EACH | Refills: 5 | Status: SHIPPED | OUTPATIENT
Start: 2023-11-28

## 2023-11-28 RX ORDER — LISINOPRIL 20 MG/1
20 TABLET ORAL DAILY
Qty: 90 TABLET | Refills: 3 | Status: SHIPPED | OUTPATIENT
Start: 2023-11-28

## 2023-11-28 RX ORDER — GLUCOSAMINE HCL/CHONDROITIN SU 500-400 MG
CAPSULE ORAL
Qty: 100 STRIP | Refills: 0 | Status: SHIPPED | OUTPATIENT
Start: 2023-11-28

## 2023-11-28 ASSESSMENT — ENCOUNTER SYMPTOMS
ALLERGIC/IMMUNOLOGIC NEGATIVE: 1
RESPIRATORY NEGATIVE: 1
EYES NEGATIVE: 1
GASTROINTESTINAL NEGATIVE: 1

## 2023-11-28 NOTE — PROGRESS NOTES
[facility] PHYSICIAN SERVICES  [facility]  [address] [address]  [address] [place] [address]  Dept: [phone]  Dept Fax: [phone]  Loc: [phone]    Lauryn Tyson is a 48 y.o. male who presents today for his medical conditions/complaints as noted below. Lauryn Tyson is c/o of 6 Month Follow-Up (Patient presents today for 6 month follow up. /)        HPI:     HPI   Chief Complaint   Patient presents with    6 Month Follow-Up     Patient presents today for 6 month follow up. He is here for 6-month follow-up for his diabetes and medications. He is doing well on the Wellbutrin 300 and Abilify. He was on BuSpar as needed. When I saw him in the office here last month with his wife their son who was 24years old had been killed in a car accident. I wrote him some Xanax and he said he had been using them mainly at night to sleep. They are going through counseling at [facility]. Past Medical History:   Diagnosis Date    Anxiety     CHF (congestive heart failure) ([address])     stage !     Chronic back pain     COPD (chronic obstructive pulmonary disease) ([facility])     COVID-19     CPAP (continuous positive airway pressure) dependence     10cm to 20cm    Depression     GERD (gastroesophageal reflux disease)     Hearing loss     Hyperlipidemia     Hypertension     Obesity     Obstructive sleep apnea     AHI: 84.1 night one and 67.3 night two, per HST, 1/2020    KEM (obstructive sleep apnea)     Restless legs syndrome       Past Surgical History:   Procedure Laterality Date    BACK SURGERY  2013    4 rods and 4 screws in back-[facility] h[facility] brain and spine    CHOLECYSTECTOMY, LAPAROSCOPIC N/A 11/17/2020    LAPAROSCOPIC CHOLECYSTECTOMY WITH CHOLANGIOGRAM WITH UMBILICAL HERNIA REPAIR performed by Megan Ortiz MD at [facility]  2008    [address] pt unsure of results or whom    COLONOSCOPY N/A 9/24/2020    Dr Adkins Members prep, iInternal hemorrhoids-Grade 1, repeat with better prepif still symptomatic

## 2024-01-23 DIAGNOSIS — E11.9 TYPE 2 DIABETES MELLITUS WITHOUT COMPLICATION, WITHOUT LONG-TERM CURRENT USE OF INSULIN (HCC): ICD-10-CM

## 2024-01-23 DIAGNOSIS — F41.8 DEPRESSION WITH ANXIETY: ICD-10-CM

## 2024-01-23 RX ORDER — ARIPIPRAZOLE 10 MG/1
TABLET ORAL
Qty: 30 TABLET | Refills: 0 | Status: SHIPPED | OUTPATIENT
Start: 2024-01-23

## 2024-01-23 RX ORDER — ROPINIROLE 0.5 MG/1
TABLET, FILM COATED ORAL
Qty: 60 TABLET | Refills: 0 | Status: SHIPPED | OUTPATIENT
Start: 2024-01-23

## 2024-02-29 DIAGNOSIS — E11.9 TYPE 2 DIABETES MELLITUS WITHOUT COMPLICATION, WITHOUT LONG-TERM CURRENT USE OF INSULIN (HCC): ICD-10-CM

## 2024-04-10 DIAGNOSIS — F41.8 DEPRESSION WITH ANXIETY: ICD-10-CM

## 2024-04-10 DIAGNOSIS — E11.9 TYPE 2 DIABETES MELLITUS WITHOUT COMPLICATION, WITHOUT LONG-TERM CURRENT USE OF INSULIN (HCC): ICD-10-CM

## 2024-04-10 RX ORDER — ATORVASTATIN CALCIUM 40 MG/1
40 TABLET, FILM COATED ORAL DAILY
Qty: 90 TABLET | Refills: 1 | Status: SHIPPED | OUTPATIENT
Start: 2024-04-10

## 2024-04-10 RX ORDER — BUSPIRONE HYDROCHLORIDE 10 MG/1
10 TABLET ORAL 2 TIMES DAILY
Qty: 180 TABLET | Refills: 1 | Status: SHIPPED | OUTPATIENT
Start: 2024-04-10

## 2024-04-10 RX ORDER — TAMSULOSIN HYDROCHLORIDE 0.4 MG/1
0.4 CAPSULE ORAL DAILY
Qty: 90 CAPSULE | Refills: 1 | Status: SHIPPED | OUTPATIENT
Start: 2024-04-10

## 2024-04-10 RX ORDER — ARIPIPRAZOLE 10 MG/1
10 TABLET ORAL DAILY
Qty: 90 TABLET | Refills: 1 | Status: SHIPPED | OUTPATIENT
Start: 2024-04-10

## 2024-04-10 RX ORDER — ROPINIROLE 0.5 MG/1
TABLET, FILM COATED ORAL
Qty: 180 TABLET | Refills: 1 | Status: SHIPPED | OUTPATIENT
Start: 2024-04-10

## 2024-04-10 RX ORDER — OMEPRAZOLE 20 MG/1
20 CAPSULE, DELAYED RELEASE ORAL
Qty: 180 CAPSULE | Refills: 1 | Status: SHIPPED | OUTPATIENT
Start: 2024-04-10

## 2024-04-10 RX ORDER — LISINOPRIL 20 MG/1
20 TABLET ORAL DAILY
Qty: 90 TABLET | Refills: 1 | Status: SHIPPED | OUTPATIENT
Start: 2024-04-10

## 2024-04-10 RX ORDER — BUPROPION HYDROCHLORIDE 300 MG/1
TABLET ORAL
Qty: 90 TABLET | Refills: 1 | Status: SHIPPED | OUTPATIENT
Start: 2024-04-10

## 2024-05-24 ENCOUNTER — OFFICE VISIT (OUTPATIENT)
Dept: PRIMARY CARE CLINIC | Age: 54
End: 2024-05-24
Payer: MEDICAID

## 2024-05-24 VITALS
HEART RATE: 64 BPM | OXYGEN SATURATION: 99 % | BODY MASS INDEX: 43.39 KG/M2 | HEIGHT: 66 IN | WEIGHT: 270 LBS | SYSTOLIC BLOOD PRESSURE: 120 MMHG | RESPIRATION RATE: 16 BRPM | DIASTOLIC BLOOD PRESSURE: 70 MMHG | TEMPERATURE: 96.1 F

## 2024-05-24 DIAGNOSIS — G47.00 INSOMNIA, UNSPECIFIED TYPE: ICD-10-CM

## 2024-05-24 DIAGNOSIS — Z00.00 WELL ADULT EXAM: Primary | ICD-10-CM

## 2024-05-24 DIAGNOSIS — M51.36 DDD (DEGENERATIVE DISC DISEASE), LUMBAR: ICD-10-CM

## 2024-05-24 DIAGNOSIS — Z12.5 SCREENING PSA (PROSTATE SPECIFIC ANTIGEN): ICD-10-CM

## 2024-05-24 DIAGNOSIS — Z13.220 ENCOUNTER FOR SCREENING FOR LIPID DISORDER: ICD-10-CM

## 2024-05-24 DIAGNOSIS — E11.9 TYPE 2 DIABETES MELLITUS WITHOUT COMPLICATION, WITHOUT LONG-TERM CURRENT USE OF INSULIN (HCC): ICD-10-CM

## 2024-05-24 DIAGNOSIS — R10.9 FLANK PAIN: ICD-10-CM

## 2024-05-24 DIAGNOSIS — F41.8 DEPRESSION WITH ANXIETY: ICD-10-CM

## 2024-05-24 DIAGNOSIS — Z13.1 SCREENING FOR DIABETES MELLITUS: ICD-10-CM

## 2024-05-24 DIAGNOSIS — Z12.11 SCREEN FOR COLON CANCER: ICD-10-CM

## 2024-05-24 LAB
ALBUMIN SERPL-MCNC: 4.5 G/DL (ref 3.5–5.2)
ALP SERPL-CCNC: 101 U/L (ref 40–130)
ALT SERPL-CCNC: 56 U/L (ref 5–41)
ANION GAP SERPL CALCULATED.3IONS-SCNC: 11 MMOL/L (ref 7–19)
AST SERPL-CCNC: 34 U/L (ref 5–40)
BILIRUB SERPL-MCNC: 1.9 MG/DL (ref 0.2–1.2)
BUN SERPL-MCNC: 14 MG/DL (ref 6–20)
CALCIUM SERPL-MCNC: 9.3 MG/DL (ref 8.6–10)
CHLORIDE SERPL-SCNC: 100 MMOL/L (ref 98–111)
CHOLEST SERPL-MCNC: 153 MG/DL (ref 160–199)
CO2 SERPL-SCNC: 28 MMOL/L (ref 22–29)
CREAT SERPL-MCNC: 0.9 MG/DL (ref 0.5–1.2)
CREAT UR-MCNC: 72.3 MG/DL (ref 39–259)
GLUCOSE SERPL-MCNC: 195 MG/DL (ref 74–109)
HBA1C MFR BLD: 7.4 % (ref 4–6)
HDLC SERPL-MCNC: 32 MG/DL (ref 55–121)
LDLC SERPL CALC-MCNC: 84 MG/DL
MICROALBUMIN UR-MCNC: <1.2 MG/DL (ref 0–19)
MICROALBUMIN/CREAT UR-RTO: NORMAL MG/G
POTASSIUM SERPL-SCNC: 4.5 MMOL/L (ref 3.5–5)
PROT SERPL-MCNC: 7.1 G/DL (ref 6.6–8.7)
PSA SERPL-MCNC: 0.82 NG/ML (ref 0–4)
SODIUM SERPL-SCNC: 139 MMOL/L (ref 136–145)
TRIGL SERPL-MCNC: 186 MG/DL (ref 0–149)

## 2024-05-24 PROCEDURE — 99396 PREV VISIT EST AGE 40-64: CPT | Performed by: NURSE PRACTITIONER

## 2024-05-24 PROCEDURE — 81002 URINALYSIS NONAUTO W/O SCOPE: CPT | Performed by: NURSE PRACTITIONER

## 2024-05-24 RX ORDER — OFLOXACIN 3 MG/ML
5 SOLUTION AURICULAR (OTIC) 2 TIMES DAILY
Qty: 1 EACH | Refills: 0 | Status: SHIPPED | OUTPATIENT
Start: 2024-05-24

## 2024-05-24 RX ORDER — DEXTROMETHORPHAN HYDROBROMIDE AND PROMETHAZINE HYDROCHLORIDE 15; 6.25 MG/5ML; MG/5ML
SYRUP ORAL
Qty: 120 ML | Refills: 0 | Status: SHIPPED | OUTPATIENT
Start: 2024-05-24

## 2024-05-24 RX ORDER — BENZONATATE 100 MG/1
100 CAPSULE ORAL 2 TIMES DAILY PRN
Qty: 20 CAPSULE | Refills: 0 | Status: SHIPPED | OUTPATIENT
Start: 2024-05-24 | End: 2024-06-03

## 2024-05-24 RX ORDER — DOXYCYCLINE HYCLATE 100 MG
100 TABLET ORAL 2 TIMES DAILY
Qty: 20 TABLET | Refills: 0 | Status: SHIPPED | OUTPATIENT
Start: 2024-05-24 | End: 2024-06-03

## 2024-05-24 ASSESSMENT — PATIENT HEALTH QUESTIONNAIRE - PHQ9
1. LITTLE INTEREST OR PLEASURE IN DOING THINGS: NOT AT ALL
3. TROUBLE FALLING OR STAYING ASLEEP: NOT AT ALL
6. FEELING BAD ABOUT YOURSELF - OR THAT YOU ARE A FAILURE OR HAVE LET YOURSELF OR YOUR FAMILY DOWN: NOT AT ALL
4. FEELING TIRED OR HAVING LITTLE ENERGY: NOT AT ALL
SUM OF ALL RESPONSES TO PHQ QUESTIONS 1-9: 0
SUM OF ALL RESPONSES TO PHQ QUESTIONS 1-9: 0
9. THOUGHTS THAT YOU WOULD BE BETTER OFF DEAD, OR OF HURTING YOURSELF: NOT AT ALL
7. TROUBLE CONCENTRATING ON THINGS, SUCH AS READING THE NEWSPAPER OR WATCHING TELEVISION: NOT AT ALL
SUM OF ALL RESPONSES TO PHQ QUESTIONS 1-9: 0
8. MOVING OR SPEAKING SO SLOWLY THAT OTHER PEOPLE COULD HAVE NOTICED. OR THE OPPOSITE, BEING SO FIGETY OR RESTLESS THAT YOU HAVE BEEN MOVING AROUND A LOT MORE THAN USUAL: NOT AT ALL
SUM OF ALL RESPONSES TO PHQ9 QUESTIONS 1 & 2: 0
5. POOR APPETITE OR OVEREATING: NOT AT ALL
10. IF YOU CHECKED OFF ANY PROBLEMS, HOW DIFFICULT HAVE THESE PROBLEMS MADE IT FOR YOU TO DO YOUR WORK, TAKE CARE OF THINGS AT HOME, OR GET ALONG WITH OTHER PEOPLE: NOT DIFFICULT AT ALL
2. FEELING DOWN, DEPRESSED OR HOPELESS: NOT AT ALL
SUM OF ALL RESPONSES TO PHQ QUESTIONS 1-9: 0

## 2024-05-24 ASSESSMENT — ENCOUNTER SYMPTOMS
EYES NEGATIVE: 1
GASTROINTESTINAL NEGATIVE: 1
COUGH: 1
ALLERGIC/IMMUNOLOGIC NEGATIVE: 1

## 2024-05-24 NOTE — PROGRESS NOTES
[facility] PHYSICIAN SERVICES  [id number] [place] [address]  Dept: [phone]  Dept Fax: [phone]  Loc: [phone]    Morgan Bauer is a 53 y.o. male who presents today for his medical conditions/complaints as noted below.  Morgan Bauer is c/o of Annual Exam (Patient presents today for his annual physical and fasting labs.)        HPI:     HPI   Chief Complaint   Patient presents with    Annual Exam     Patient presents today for his annual physical and fasting labs.   He does not regularly check his sugar.  He is taking his metformin.  He is not out of any medications.  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  Is been having some right flank pain he does not know if he pulled a muscle or something else is going on.  He has not had any fever.  He has had an upper respiratory infection for 3 weeks and cannot get rid of the cough.  His son passed away in a car accident last year in we started him on something for his mood he said that is working well and he has been to counseling.  Lab Results   Component Value Date/Time    GLUCOSE 140 11/28/2023 11:50 AM    GLUCOSE 111 05/18/2023 08:29 AM    GLUCOSE 94 11/17/2022 09:48 AM    LABA1C 7.4 11/28/2023 11:50 AM    LABA1C 6.4 05/18/2023 08:29 AM    LABA1C 6.1 11/17/2022 09:48 AM     Past Medical History:   Diagnosis Date    Anxiety     CHF (congestive heart failure) ([facility])     stage !    Chronic back pain     COPD (chronic obstructive pulmonary disease) ([facility])     COVID-19     CPAP (continuous positive airway pressure) dependence     10cm to 20cm    Depression     GERD (gastroesophageal reflux disease)     Hearing loss     Hyperlipidemia     Hypertension     Obesity     Obstructive sleep apnea     AHI: 84.1 night one and 67.3 night two, per HST, 1/2020    KEM (obstructive sleep apnea)     Restless legs syndrome       Past Surgical History:   Procedure Laterality Date    BACK SURGERY  2013    4 rods and 4 screws in back[facility]

## 2024-05-24 NOTE — PATIENT INSTRUCTIONS
Continue metformin, may adjust if a1 c high  Doxy for antibiotics for lungs  Mucinex for cough  Cough perrles during day , cough liquid at night  If ear bothers you do the drops if not keep for a rainy day.  Continue all meds.

## 2024-06-20 ENCOUNTER — TELEPHONE (OUTPATIENT)
Dept: PRIMARY CARE CLINIC | Age: 54
End: 2024-06-20

## 2024-06-20 NOTE — TELEPHONE ENCOUNTER
Can you add an addendum to patients last office note that he is using his cpap and is benefiting from it? I am needing to fax this to [facility].

## 2024-07-02 ENCOUNTER — TELEPHONE (OUTPATIENT)
Dept: GASTROENTEROLOGY | Age: 54
End: 2024-07-02

## 2024-07-08 ENCOUNTER — TELEPHONE (OUTPATIENT)
Dept: GASTROENTEROLOGY | Age: 54
End: 2024-07-08

## 2024-10-03 ENCOUNTER — PATIENT MESSAGE (OUTPATIENT)
Dept: PRIMARY CARE CLINIC | Age: 54
End: 2024-10-03

## 2024-10-03 RX ORDER — METOPROLOL SUCCINATE 50 MG/1
50 TABLET, EXTENDED RELEASE ORAL DAILY
Qty: 90 TABLET | Refills: 3 | Status: SHIPPED | OUTPATIENT
Start: 2024-10-03

## 2024-10-22 DIAGNOSIS — F41.8 DEPRESSION WITH ANXIETY: ICD-10-CM

## 2024-10-22 RX ORDER — BUSPIRONE HYDROCHLORIDE 10 MG/1
TABLET ORAL
Qty: 56 TABLET | Refills: 3 | Status: SHIPPED | OUTPATIENT
Start: 2024-10-22

## 2024-10-22 RX ORDER — ARIPIPRAZOLE 10 MG/1
10 TABLET ORAL DAILY
Qty: 28 TABLET | Refills: 3 | Status: SHIPPED | OUTPATIENT
Start: 2024-10-22

## 2024-10-22 RX ORDER — BUPROPION HYDROCHLORIDE 300 MG/1
TABLET ORAL
Qty: 28 TABLET | Refills: 3 | Status: SHIPPED | OUTPATIENT
Start: 2024-10-22

## 2024-10-22 RX ORDER — ATORVASTATIN CALCIUM 40 MG/1
40 TABLET, FILM COATED ORAL DAILY
Qty: 28 TABLET | Refills: 3 | Status: SHIPPED | OUTPATIENT
Start: 2024-10-22

## 2024-11-25 DIAGNOSIS — E11.9 TYPE 2 DIABETES MELLITUS WITHOUT COMPLICATION, WITHOUT LONG-TERM CURRENT USE OF INSULIN (HCC): ICD-10-CM

## 2024-11-26 DIAGNOSIS — E11.9 TYPE 2 DIABETES MELLITUS WITHOUT COMPLICATION, WITHOUT LONG-TERM CURRENT USE OF INSULIN (HCC): ICD-10-CM

## 2024-12-27 ENCOUNTER — OFFICE VISIT (OUTPATIENT)
Dept: PRIMARY CARE CLINIC | Age: 54
End: 2024-12-27

## 2024-12-27 VITALS
HEART RATE: 71 BPM | BODY MASS INDEX: 41.3 KG/M2 | SYSTOLIC BLOOD PRESSURE: 142 MMHG | HEIGHT: 66 IN | OXYGEN SATURATION: 96 % | TEMPERATURE: 97.8 F | DIASTOLIC BLOOD PRESSURE: 96 MMHG | WEIGHT: 257 LBS | RESPIRATION RATE: 20 BRPM

## 2024-12-27 DIAGNOSIS — J02.9 SORE THROAT: Primary | ICD-10-CM

## 2024-12-27 DIAGNOSIS — R09.81 HEAD CONGESTION: ICD-10-CM

## 2024-12-27 DIAGNOSIS — R05.9 COUGH, UNSPECIFIED TYPE: ICD-10-CM

## 2024-12-27 LAB
INFLUENZA VIRUS A RNA: NEGATIVE
INFLUENZA VIRUS B RNA: NEGATIVE
RSV RNA: NEGATIVE
S PYO AG THROAT QL: NORMAL

## 2024-12-27 RX ORDER — LISINOPRIL 40 MG/1
TABLET ORAL
COMMUNITY
Start: 2024-12-12

## 2024-12-27 RX ORDER — ALBUTEROL SULFATE 90 UG/1
2 INHALANT RESPIRATORY (INHALATION) 4 TIMES DAILY PRN
Qty: 18 G | Refills: 0 | Status: SHIPPED | OUTPATIENT
Start: 2024-12-27

## 2024-12-27 RX ORDER — AZITHROMYCIN 250 MG/1
TABLET, FILM COATED ORAL
Qty: 1 PACKET | Refills: 0 | Status: SHIPPED | OUTPATIENT
Start: 2024-12-27 | End: 2025-01-06

## 2024-12-27 RX ORDER — BETAMETHASONE SODIUM PHOSPHATE AND BETAMETHASONE ACETATE 3; 3 MG/ML; MG/ML
12 INJECTION, SUSPENSION INTRA-ARTICULAR; INTRALESIONAL; INTRAMUSCULAR; SOFT TISSUE ONCE
Status: COMPLETED | OUTPATIENT
Start: 2024-12-27 | End: 2024-12-27

## 2024-12-27 RX ORDER — FLUTICASONE PROPIONATE 50 MCG
1 SPRAY, SUSPENSION (ML) NASAL DAILY
Qty: 32 G | Refills: 1 | Status: SHIPPED | OUTPATIENT
Start: 2024-12-27

## 2024-12-27 RX ORDER — PREDNISONE 10 MG/1
TABLET ORAL
Qty: 18 TABLET | Refills: 0 | Status: SHIPPED | OUTPATIENT
Start: 2024-12-27

## 2024-12-27 RX ORDER — ISOSORBIDE MONONITRATE 30 MG/1
TABLET, EXTENDED RELEASE ORAL
COMMUNITY
Start: 2024-12-12

## 2024-12-27 RX ADMIN — BETAMETHASONE SODIUM PHOSPHATE AND BETAMETHASONE ACETATE 12 MG: 3; 3 INJECTION, SUSPENSION INTRA-ARTICULAR; INTRALESIONAL; INTRAMUSCULAR; SOFT TISSUE at 10:50

## 2024-12-27 SDOH — ECONOMIC STABILITY: FOOD INSECURITY: WITHIN THE PAST 12 MONTHS, THE FOOD YOU BOUGHT JUST DIDN'T LAST AND YOU DIDN'T HAVE MONEY TO GET MORE.: NEVER TRUE

## 2024-12-27 SDOH — ECONOMIC STABILITY: FOOD INSECURITY: WITHIN THE PAST 12 MONTHS, YOU WORRIED THAT YOUR FOOD WOULD RUN OUT BEFORE YOU GOT MONEY TO BUY MORE.: NEVER TRUE

## 2024-12-27 SDOH — ECONOMIC STABILITY: INCOME INSECURITY: HOW HARD IS IT FOR YOU TO PAY FOR THE VERY BASICS LIKE FOOD, HOUSING, MEDICAL CARE, AND HEATING?: NOT VERY HARD

## 2024-12-27 ASSESSMENT — ENCOUNTER SYMPTOMS
NAUSEA: 0
WHEEZING: 1
SHORTNESS OF BREATH: 1
GASTROINTESTINAL NEGATIVE: 1
CONSTIPATION: 0
COUGH: 1
SORE THROAT: 1
ALLERGIC/IMMUNOLOGIC NEGATIVE: 1
EYES NEGATIVE: 1
DIARRHEA: 0
RHINORRHEA: 1

## 2024-12-27 NOTE — PATIENT INSTRUCTIONS
Viral syndrome   Generally, I recommend Flonase one spray to each side twice a day for 14 days along with a potent antihistamine such as Allegra D, Zyrtec D or Claritin D for 14 days.   If you have Blood pressure problems you may not be able to tolerate the D version.  People with high blood pressure may use Coricidin HBP or Benadryl for sinus   symptoms.   Other OTC nasal sprays such as saline spray, Vicks or Naso jing can also be helpful.      Oscillococcinum may be helpful for flu or flu-like symptoms.     Supplement with Vit D3 5000 units daily, Vit C 1000 mg daily and Zinc 50 mg daily.     Many illnesses are caused by viruses. These conditions usually run their course in 7-14 days.  Antibiotics do not help fight viral infections and are not needed at this time. Viral syndromes are treated with symptomatic support. You may take tylenol or ibuprofen for fever or aches and pains. Stay hydrated by taking sips of water or non caffeinated, noncarbonated, and nonalcoholic beverages throughout the day. For sore throat, you may gargle with warm salt water, use lozenges or sprays. Hot tea with honey may also be soothing to the throat. Using a daily antihistamine such as Claritin, Zyrtec or Allegra can help with upper respiratory symptoms. Benadryl can be sedating but is helpful at drying secretions and may be taken at night. For earaches, microwave a wet washcloth for 2 mins then using tongs (do not touch as it may scald you ) place cloth in ceramic cup and hold up to ear. The moist heat can be soothing to the ear.     Call if you have a fever greater than 102 F or if symptoms do not improve. Your provider may also send you in prescription medications depending on your symptoms and their severity. Take all medications as directed on package unless specifically told otherwise.     Currently the CDC does not require a mandatory quarantine for COVID 19 infection.  For a COVID-19 , influenza or other significant viral

## 2024-12-27 NOTE — PROGRESS NOTES
thisencounter note is electronic transcription/translation of spoken language to printed texts.  The electronic translation of spoken language may be erroneous, or at times, nonsensical words or phrases may be inadvertentlytranscribed.  Although I have reviewed the note for such errors, some may still exist.

## 2025-01-07 NOTE — TELEPHONE ENCOUNTER
HE IS HAVING IT DONE TUES. MORNING. WILL YOU CALL HIM W/RESULTS? Patient calling on Alprazolam refill request, patient states she sees NP in Psych office. They do not prescribe only anti-depressants.

## 2025-01-08 DIAGNOSIS — E11.9 TYPE 2 DIABETES MELLITUS WITHOUT COMPLICATION, WITHOUT LONG-TERM CURRENT USE OF INSULIN (HCC): ICD-10-CM

## 2025-03-06 RX ORDER — OMEPRAZOLE 20 MG/1
20 CAPSULE, DELAYED RELEASE ORAL DAILY
Qty: 56 CAPSULE | Refills: 3 | Status: SHIPPED | OUTPATIENT
Start: 2025-03-06

## (undated) DEVICE — SET EXTN TBNG IV STD BOR 30IN NO STPCOCK

## (undated) DEVICE — BAG SPEC REM 224ML W4XL6IN DIA10MM 1 HND GYN DISP ENDOPCH

## (undated) DEVICE — GLOVE SURG SZ 75 CRM LTX FREE POLYISOPRENE POLYMER BEAD ANTI

## (undated) DEVICE — MINI ENDOCUT SCISSOR TIP, DISPOSABLE: Brand: RENEW

## (undated) DEVICE — ENDO KIT,LOURDES HOSPITAL: Brand: MEDLINE INDUSTRIES, INC.

## (undated) DEVICE — TROCAR: Brand: KII® SLEEVE

## (undated) DEVICE — SUTURE SZ 0 27IN 5/8 CIR UR-6  TAPER PT VIOLET ABSRB VICRYL J603H

## (undated) DEVICE — PUMP SUC IRR TBNG L10FT W/ HNDPC ASSEMB STRYKEFLOW 2

## (undated) DEVICE — TROCAR: Brand: KII FIOS FIRST ENTRY

## (undated) DEVICE — TROCARS: Brand: KII® BLUNT TIP ACCESS SYSTEM

## (undated) DEVICE — GENERAL LAP CDS

## (undated) DEVICE — KIT CHOLGM POLYUR W/ KARLAN BLLN CATH 4FR L60CM 5MM INTRO

## (undated) DEVICE — SUTURE VCRL SZ 3-0 L27IN ABSRB UD L26MM SH 1/2 CIR J416H

## (undated) DEVICE — TUBE ET 8MM NSL ORAL BASIC CUF INTMED MURPHY EYE RADPQ MRK

## (undated) DEVICE — C-ARM: Brand: UNBRANDED

## (undated) DEVICE — GLOVE SURG SZ 85 L12IN FNGR ORTHO 126MIL CRM LTX FREE

## (undated) DEVICE — SYRINGE, LUER LOCK, 10ML: Brand: MEDLINE

## (undated) DEVICE — GOWN,PREVENTION PLUS,2XL,ST,22/CS: Brand: MEDLINE

## (undated) DEVICE — STERILE POLYISOPRENE POWDER-FREE SURGICAL GLOVES: Brand: PROTEXIS

## (undated) DEVICE — LARYNGOSCOPE BLDE MAC HNDL M SZ 35 ST CURAPLEX CURAVIEW LED

## (undated) DEVICE — ADHESIVE SKIN CLSR 0.7ML TOP DERMBND ADV

## (undated) DEVICE — CONTRAST IOTHALAMATE MEGLUMINE 60% 50 ML INJ CONRAY 60

## (undated) DEVICE — SOLUTION IV IRRIG POUR BRL 0.9% SODIUM CHL 2F7124

## (undated) DEVICE — FORCEPS BX L240CM JAW DIA2.4MM ORNG L CAP W/ NDL DISP RAD

## (undated) DEVICE — GOWN,PREVENTION PLUS,XL,ST,24/CS: Brand: MEDLINE

## (undated) DEVICE — APPLIER CLP M L L11.4IN DIA10MM ENDOSCP ROT MULT FOR LIG

## (undated) DEVICE — SUTURE MCRYL SZ 4-0 L18IN ABSRB UD L19MM PS-2 3/8 CIR PRIM Y496G

## (undated) DEVICE — SUTURE PERMAHAND SZ 2-0 L18IN NONABSORBABLE BLK L26MM FS 685G